# Patient Record
Sex: MALE | Race: WHITE | NOT HISPANIC OR LATINO
[De-identification: names, ages, dates, MRNs, and addresses within clinical notes are randomized per-mention and may not be internally consistent; named-entity substitution may affect disease eponyms.]

---

## 2017-01-12 ENCOUNTER — APPOINTMENT (OUTPATIENT)
Dept: NEPHROLOGY | Facility: CLINIC | Age: 72
End: 2017-01-12

## 2017-01-12 VITALS
WEIGHT: 176.67 LBS | DIASTOLIC BLOOD PRESSURE: 72 MMHG | BODY MASS INDEX: 26.78 KG/M2 | HEIGHT: 68 IN | SYSTOLIC BLOOD PRESSURE: 131 MMHG | OXYGEN SATURATION: 99 % | HEART RATE: 66 BPM

## 2017-01-17 LAB
ALBUMIN SERPL ELPH-MCNC: 4.2 G/DL
ALP BLD-CCNC: 157 U/L
ALT SERPL-CCNC: 10 U/L
ANION GAP SERPL CALC-SCNC: 16 MMOL/L
AST SERPL-CCNC: 10 U/L
BILIRUB SERPL-MCNC: 0.3 MG/DL
BUN SERPL-MCNC: 53 MG/DL
CALCIUM SERPL-MCNC: 9 MG/DL
CHLORIDE SERPL-SCNC: 106 MMOL/L
CO2 SERPL-SCNC: 19 MMOL/L
CREAT SERPL-MCNC: 2.23 MG/DL
GLUCOSE SERPL-MCNC: 127 MG/DL
POTASSIUM SERPL-SCNC: 5.1 MMOL/L
PROT SERPL-MCNC: 7.3 G/DL
PSA FREE FLD-MCNC: 50.2 %
PSA FREE SERPL-MCNC: 1.08 NG/ML
PSA SERPL-MCNC: 2.15 NG/ML
SODIUM SERPL-SCNC: 141 MMOL/L
URATE SERPL-MCNC: 3.5 MG/DL

## 2017-05-02 ENCOUNTER — APPOINTMENT (OUTPATIENT)
Dept: ENDOCRINOLOGY | Facility: CLINIC | Age: 72
End: 2017-05-02

## 2017-05-02 VITALS
DIASTOLIC BLOOD PRESSURE: 78 MMHG | BODY MASS INDEX: 27.13 KG/M2 | HEIGHT: 68 IN | OXYGEN SATURATION: 98 % | HEART RATE: 71 BPM | SYSTOLIC BLOOD PRESSURE: 118 MMHG | WEIGHT: 179 LBS

## 2017-05-02 LAB — GLUCOSE BLDC GLUCOMTR-MCNC: 138

## 2017-05-10 ENCOUNTER — APPOINTMENT (OUTPATIENT)
Dept: NEPHROLOGY | Facility: CLINIC | Age: 72
End: 2017-05-10

## 2017-05-10 VITALS
HEART RATE: 65 BPM | SYSTOLIC BLOOD PRESSURE: 143 MMHG | WEIGHT: 179 LBS | OXYGEN SATURATION: 98 % | BODY MASS INDEX: 27.13 KG/M2 | DIASTOLIC BLOOD PRESSURE: 82 MMHG | HEIGHT: 68 IN

## 2017-05-18 LAB
ALBUMIN SERPL ELPH-MCNC: 4.3 G/DL
ALP BLD-CCNC: 165 U/L
ALT SERPL-CCNC: 10 U/L
ANION GAP SERPL CALC-SCNC: 17 MMOL/L
AST SERPL-CCNC: 13 U/L
BASOPHILS # BLD AUTO: 0.05 K/UL
BASOPHILS NFR BLD AUTO: 0.5 %
BILIRUB SERPL-MCNC: 0.4 MG/DL
BUN SERPL-MCNC: 43 MG/DL
CALCIUM SERPL-MCNC: 9.8 MG/DL
CHLORIDE SERPL-SCNC: 104 MMOL/L
CO2 SERPL-SCNC: 21 MMOL/L
CREAT SERPL-MCNC: 2.15 MG/DL
EOSINOPHIL # BLD AUTO: 0.46 K/UL
EOSINOPHIL NFR BLD AUTO: 4.4 %
GLUCOSE SERPL-MCNC: 73 MG/DL
HCT VFR BLD CALC: 39.1 %
HGB BLD-MCNC: 12.6 G/DL
IMM GRANULOCYTES NFR BLD AUTO: 0.4 %
LYMPHOCYTES # BLD AUTO: 1.39 K/UL
LYMPHOCYTES NFR BLD AUTO: 13.4 %
MAN DIFF?: NORMAL
MCHC RBC-ENTMCNC: 28.4 PG
MCHC RBC-ENTMCNC: 32.2 GM/DL
MCV RBC AUTO: 88.1 FL
MONOCYTES # BLD AUTO: 0.91 K/UL
MONOCYTES NFR BLD AUTO: 8.8 %
NEUTROPHILS # BLD AUTO: 7.49 K/UL
NEUTROPHILS NFR BLD AUTO: 72.5 %
PLATELET # BLD AUTO: 204 K/UL
POTASSIUM SERPL-SCNC: 4.8 MMOL/L
PROT SERPL-MCNC: 7.9 G/DL
RBC # BLD: 4.44 M/UL
RBC # FLD: 14.2 %
SODIUM SERPL-SCNC: 142 MMOL/L
URATE SERPL-MCNC: 3.2 MG/DL
WBC # FLD AUTO: 10.34 K/UL

## 2017-09-05 ENCOUNTER — APPOINTMENT (OUTPATIENT)
Dept: ENDOCRINOLOGY | Facility: CLINIC | Age: 72
End: 2017-09-05
Payer: MEDICARE

## 2017-09-05 VITALS
HEART RATE: 60 BPM | WEIGHT: 175 LBS | SYSTOLIC BLOOD PRESSURE: 130 MMHG | HEIGHT: 68 IN | DIASTOLIC BLOOD PRESSURE: 80 MMHG | OXYGEN SATURATION: 98 % | BODY MASS INDEX: 26.52 KG/M2

## 2017-09-05 PROCEDURE — 99214 OFFICE O/P EST MOD 30 MIN: CPT | Mod: 25

## 2017-09-07 ENCOUNTER — APPOINTMENT (OUTPATIENT)
Dept: NEPHROLOGY | Facility: CLINIC | Age: 72
End: 2017-09-07
Payer: MEDICARE

## 2017-09-07 VITALS
HEIGHT: 68 IN | OXYGEN SATURATION: 99 % | HEART RATE: 66 BPM | BODY MASS INDEX: 26.67 KG/M2 | DIASTOLIC BLOOD PRESSURE: 78 MMHG | SYSTOLIC BLOOD PRESSURE: 122 MMHG | WEIGHT: 176 LBS

## 2017-09-07 PROCEDURE — 99213 OFFICE O/P EST LOW 20 MIN: CPT

## 2018-01-09 ENCOUNTER — APPOINTMENT (OUTPATIENT)
Dept: ENDOCRINOLOGY | Facility: CLINIC | Age: 73
End: 2018-01-09
Payer: MEDICARE

## 2018-01-09 VITALS
DIASTOLIC BLOOD PRESSURE: 58 MMHG | BODY MASS INDEX: 26.83 KG/M2 | WEIGHT: 177 LBS | HEIGHT: 68 IN | HEART RATE: 65 BPM | OXYGEN SATURATION: 98 % | SYSTOLIC BLOOD PRESSURE: 92 MMHG

## 2018-01-09 PROCEDURE — 99214 OFFICE O/P EST MOD 30 MIN: CPT

## 2018-03-01 ENCOUNTER — APPOINTMENT (OUTPATIENT)
Dept: NEPHROLOGY | Facility: CLINIC | Age: 73
End: 2018-03-01
Payer: MEDICARE

## 2018-03-01 VITALS
BODY MASS INDEX: 26.07 KG/M2 | DIASTOLIC BLOOD PRESSURE: 63 MMHG | HEIGHT: 68 IN | SYSTOLIC BLOOD PRESSURE: 109 MMHG | WEIGHT: 172 LBS | HEART RATE: 66 BPM | OXYGEN SATURATION: 97 %

## 2018-03-01 PROCEDURE — 99213 OFFICE O/P EST LOW 20 MIN: CPT

## 2018-03-02 LAB
ALBUMIN SERPL ELPH-MCNC: 3.9 G/DL
ALP BLD-CCNC: 147 U/L
ALT SERPL-CCNC: <4 U/L
ANION GAP SERPL CALC-SCNC: 14 MMOL/L
AST SERPL-CCNC: 9 U/L
BILIRUB SERPL-MCNC: 0.5 MG/DL
BUN SERPL-MCNC: 40 MG/DL
CALCIUM SERPL-MCNC: 9.2 MG/DL
CHLORIDE SERPL-SCNC: 106 MMOL/L
CO2 SERPL-SCNC: 22 MMOL/L
CREAT SERPL-MCNC: 2.03 MG/DL
GLUCOSE SERPL-MCNC: 74 MG/DL
POTASSIUM SERPL-SCNC: 4.5 MMOL/L
PROT SERPL-MCNC: 6.8 G/DL
SODIUM SERPL-SCNC: 142 MMOL/L

## 2018-05-16 ENCOUNTER — APPOINTMENT (OUTPATIENT)
Dept: ENDOCRINOLOGY | Facility: CLINIC | Age: 73
End: 2018-05-16
Payer: MEDICARE

## 2018-05-16 VITALS
WEIGHT: 166 LBS | DIASTOLIC BLOOD PRESSURE: 76 MMHG | HEART RATE: 33 BPM | HEIGHT: 69 IN | OXYGEN SATURATION: 92 % | SYSTOLIC BLOOD PRESSURE: 118 MMHG | BODY MASS INDEX: 24.59 KG/M2

## 2018-05-16 PROCEDURE — 99214 OFFICE O/P EST MOD 30 MIN: CPT | Mod: 25

## 2018-05-16 PROCEDURE — 83036 HEMOGLOBIN GLYCOSYLATED A1C: CPT | Mod: QW

## 2018-05-16 RX ORDER — OSELTAMIVIR PHOSPHATE 75 MG/1
75 CAPSULE ORAL
Qty: 10 | Refills: 0 | Status: COMPLETED | COMMUNITY
Start: 2018-01-16

## 2018-06-12 ENCOUNTER — EMERGENCY (EMERGENCY)
Facility: HOSPITAL | Age: 73
LOS: 1 days | Discharge: HOME | End: 2018-06-12
Attending: EMERGENCY MEDICINE | Admitting: EMERGENCY MEDICINE
Payer: MEDICARE

## 2018-06-12 VITALS
DIASTOLIC BLOOD PRESSURE: 83 MMHG | RESPIRATION RATE: 16 BRPM | WEIGHT: 175.93 LBS | TEMPERATURE: 98 F | HEIGHT: 69 IN | OXYGEN SATURATION: 97 % | HEART RATE: 80 BPM | SYSTOLIC BLOOD PRESSURE: 171 MMHG

## 2018-06-12 VITALS
DIASTOLIC BLOOD PRESSURE: 67 MMHG | SYSTOLIC BLOOD PRESSURE: 120 MMHG | RESPIRATION RATE: 16 BRPM | HEART RATE: 73 BPM | OXYGEN SATURATION: 99 % | TEMPERATURE: 98 F

## 2018-06-12 DIAGNOSIS — Z98.49 CATARACT EXTRACTION STATUS, UNSPECIFIED EYE: Chronic | ICD-10-CM

## 2018-06-12 DIAGNOSIS — N20.0 CALCULUS OF KIDNEY: Chronic | ICD-10-CM

## 2018-06-12 DIAGNOSIS — Z95.5 PRESENCE OF CORONARY ANGIOPLASTY IMPLANT AND GRAFT: Chronic | ICD-10-CM

## 2018-06-12 DIAGNOSIS — Z95.818 PRESENCE OF OTHER CARDIAC IMPLANTS AND GRAFTS: Chronic | ICD-10-CM

## 2018-06-12 DIAGNOSIS — Z98.89 OTHER SPECIFIED POSTPROCEDURAL STATES: Chronic | ICD-10-CM

## 2018-06-12 PROCEDURE — 14040 TIS TRNFR F/C/C/M/N/A/G/H/F: CPT

## 2018-06-12 PROCEDURE — 70450 CT HEAD/BRAIN W/O DYE: CPT

## 2018-06-12 PROCEDURE — 99284 EMERGENCY DEPT VISIT MOD MDM: CPT

## 2018-06-12 PROCEDURE — 90715 TDAP VACCINE 7 YRS/> IM: CPT

## 2018-06-12 PROCEDURE — 70450 CT HEAD/BRAIN W/O DYE: CPT | Mod: 26

## 2018-06-12 PROCEDURE — 99285 EMERGENCY DEPT VISIT HI MDM: CPT | Mod: 25

## 2018-06-12 PROCEDURE — 90471 IMMUNIZATION ADMIN: CPT

## 2018-06-12 RX ORDER — TETANUS TOXOID, REDUCED DIPHTHERIA TOXOID AND ACELLULAR PERTUSSIS VACCINE, ADSORBED 5; 2.5; 8; 8; 2.5 [IU]/.5ML; [IU]/.5ML; UG/.5ML; UG/.5ML; UG/.5ML
0.5 SUSPENSION INTRAMUSCULAR ONCE
Qty: 0 | Refills: 0 | Status: COMPLETED | OUTPATIENT
Start: 2018-06-12 | End: 2018-06-12

## 2018-06-12 RX ORDER — CEPHALEXIN 500 MG
500 CAPSULE ORAL ONCE
Qty: 0 | Refills: 0 | Status: COMPLETED | OUTPATIENT
Start: 2018-06-12 | End: 2018-06-12

## 2018-06-12 RX ORDER — CEPHALEXIN 500 MG
1 CAPSULE ORAL
Qty: 14 | Refills: 0 | OUTPATIENT
Start: 2018-06-12 | End: 2018-06-18

## 2018-06-12 RX ORDER — CEPHALEXIN 500 MG
1 CAPSULE ORAL
Qty: 28 | Refills: 0
Start: 2018-06-12 | End: 2018-06-18

## 2018-06-12 RX ADMIN — TETANUS TOXOID, REDUCED DIPHTHERIA TOXOID AND ACELLULAR PERTUSSIS VACCINE, ADSORBED 0.5 MILLILITER(S): 5; 2.5; 8; 8; 2.5 SUSPENSION INTRAMUSCULAR at 03:25

## 2018-06-12 RX ADMIN — Medication 500 MILLIGRAM(S): at 03:22

## 2018-06-12 NOTE — ED PROVIDER NOTE - PMH
CAD (coronary artery disease)    CRI (chronic renal insufficiency)    CVA (cerebral infarction)    Diabetes    Hyperlipidemia    Hypertension    Kidney stone    Merkel cell carcinoma of face    SBO (small bowel obstruction)  sept 2015

## 2018-06-12 NOTE — PROCEDURE NOTE - NSLAC1DETAILSPROC_SKIN_A_CORE
wound explored to base in bloodless field/stent applied/non-extensive debridement/multiple flaps aligned

## 2018-06-12 NOTE — ED PROVIDER NOTE - PSH
Kidney stones  9/16/19  S/P appendectomy  age 16  S/P cataract surgery    S/P coronary artery stent placement  12/21/10  S/P tonsillectomy and adenoidectomy  at age 31  Status post placement of implantable loop recorder  May 2015

## 2018-06-12 NOTE — ED PROVIDER NOTE - MEDICAL DECISION MAKING DETAILS
73 y.o. M fell out of bed, multiple lacerations to left ear - ct head negative - plastics for repair - abx, outpt f/u for wound check

## 2018-06-12 NOTE — ED PROVIDER NOTE - SKIN, MLM
left ear - complex laceration of tragus 3cm, 3 lacerations of helix to lobule 0.5-1cm in length, all actively bleeding; no other wounds noted

## 2018-06-12 NOTE — ED PROVIDER NOTE - OBJECTIVE STATEMENT
73 y.o. M fell out of bed, was asleep, bleeding from left side of head 73 y.o. M fell out of bed, was asleep, bleeding from left side of head, says has fallen out of bed before, came to hospital b/c bleeding from left ear, is on plavix and asa for CAD, denies any pain, no HA, no neck/back pain, able to move extremities ok, no paresthesias, only here b/c of ear bleeding - considering if wants plastic surgeon

## 2018-08-08 ENCOUNTER — APPOINTMENT (OUTPATIENT)
Dept: ENDOCRINOLOGY | Facility: CLINIC | Age: 73
End: 2018-08-08
Payer: MEDICARE

## 2018-08-08 VITALS
WEIGHT: 170 LBS | DIASTOLIC BLOOD PRESSURE: 64 MMHG | BODY MASS INDEX: 25.18 KG/M2 | OXYGEN SATURATION: 98 % | HEIGHT: 69 IN | HEART RATE: 62 BPM | SYSTOLIC BLOOD PRESSURE: 132 MMHG

## 2018-08-08 PROCEDURE — 83036 HEMOGLOBIN GLYCOSYLATED A1C: CPT | Mod: QW

## 2018-08-08 PROCEDURE — 82962 GLUCOSE BLOOD TEST: CPT

## 2018-08-08 PROCEDURE — 99215 OFFICE O/P EST HI 40 MIN: CPT | Mod: 25

## 2018-08-08 RX ORDER — CEFUROXIME AXETIL 500 MG/1
500 TABLET ORAL
Qty: 10 | Refills: 0 | Status: COMPLETED | COMMUNITY
Start: 2018-07-26

## 2018-08-08 RX ORDER — CEPHALEXIN 500 MG/1
500 CAPSULE ORAL
Qty: 28 | Refills: 0 | Status: COMPLETED | COMMUNITY
Start: 2018-06-12

## 2018-08-08 RX ORDER — SULFAMETHOXAZOLE AND TRIMETHOPRIM 800; 160 MG/1; MG/1
800-160 TABLET ORAL
Qty: 10 | Refills: 0 | Status: COMPLETED | COMMUNITY
Start: 2018-07-26

## 2018-08-09 LAB
GLUCOSE BLDC GLUCOMTR-MCNC: 199
HBA1C MFR BLD HPLC: 6.5

## 2018-10-10 ENCOUNTER — APPOINTMENT (OUTPATIENT)
Dept: ENDOCRINOLOGY | Facility: CLINIC | Age: 73
End: 2018-10-10
Payer: MEDICARE

## 2018-10-10 PROCEDURE — 95250 CONT GLUC MNTR PHYS/QHP EQP: CPT

## 2018-10-10 PROCEDURE — 95251 CONT GLUC MNTR ANALYSIS I&R: CPT

## 2018-10-12 ENCOUNTER — CLINICAL ADVICE (OUTPATIENT)
Age: 73
End: 2018-10-12

## 2018-10-17 ENCOUNTER — CLINICAL ADVICE (OUTPATIENT)
Age: 73
End: 2018-10-17

## 2018-10-31 ENCOUNTER — APPOINTMENT (OUTPATIENT)
Dept: ENDOCRINOLOGY | Facility: CLINIC | Age: 73
End: 2018-10-31
Payer: MEDICARE

## 2018-10-31 VITALS — BODY MASS INDEX: 24.37 KG/M2 | WEIGHT: 165 LBS

## 2018-10-31 PROCEDURE — 83036 HEMOGLOBIN GLYCOSYLATED A1C: CPT | Mod: QW

## 2018-10-31 PROCEDURE — G0108 DIAB MANAGE TRN  PER INDIV: CPT

## 2018-10-31 PROCEDURE — 82962 GLUCOSE BLOOD TEST: CPT

## 2018-11-01 LAB
GLUCOSE BLDC GLUCOMTR-MCNC: 130
HBA1C MFR BLD HPLC: 6.5

## 2018-11-13 ENCOUNTER — INBOUND DOCUMENT (OUTPATIENT)
Age: 73
End: 2018-11-13

## 2018-12-04 ENCOUNTER — LABORATORY RESULT (OUTPATIENT)
Age: 73
End: 2018-12-04

## 2018-12-04 ENCOUNTER — APPOINTMENT (OUTPATIENT)
Dept: ENDOCRINOLOGY | Facility: CLINIC | Age: 73
End: 2018-12-04
Payer: MEDICARE

## 2018-12-04 VITALS
WEIGHT: 167 LBS | BODY MASS INDEX: 24.73 KG/M2 | HEIGHT: 69 IN | SYSTOLIC BLOOD PRESSURE: 130 MMHG | HEART RATE: 65 BPM | DIASTOLIC BLOOD PRESSURE: 60 MMHG | OXYGEN SATURATION: 98 %

## 2018-12-04 LAB — GLUCOSE BLDC GLUCOMTR-MCNC: 140

## 2018-12-04 PROCEDURE — 82962 GLUCOSE BLOOD TEST: CPT

## 2018-12-04 PROCEDURE — 99214 OFFICE O/P EST MOD 30 MIN: CPT | Mod: 25

## 2018-12-05 LAB
ALBUMIN SERPL ELPH-MCNC: 4.2 G/DL
ALP BLD-CCNC: 139 U/L
ALT SERPL-CCNC: <5 U/L
ANION GAP SERPL CALC-SCNC: 11 MMOL/L
AST SERPL-CCNC: 10 U/L
BILIRUB SERPL-MCNC: 0.3 MG/DL
BUN SERPL-MCNC: 48 MG/DL
CALCIUM SERPL-MCNC: 9.2 MG/DL
CHLORIDE SERPL-SCNC: 103 MMOL/L
CO2 SERPL-SCNC: 24 MMOL/L
CREAT SERPL-MCNC: 1.86 MG/DL
GLUCOSE SERPL-MCNC: 133 MG/DL
POTASSIUM SERPL-SCNC: 4.4 MMOL/L
PROT SERPL-MCNC: 6.9 G/DL
SODIUM SERPL-SCNC: 138 MMOL/L
T3RU NFR SERPL: 0.94 INDEX
T4 SERPL-MCNC: 6.7 UG/DL
TSH SERPL-ACNC: 2.3 UIU/ML

## 2019-03-19 ENCOUNTER — APPOINTMENT (OUTPATIENT)
Dept: ENDOCRINOLOGY | Facility: CLINIC | Age: 74
End: 2019-03-19
Payer: MEDICARE

## 2019-03-19 VITALS — WEIGHT: 164 LBS | BODY MASS INDEX: 24.22 KG/M2

## 2019-03-19 PROCEDURE — 82962 GLUCOSE BLOOD TEST: CPT

## 2019-03-19 PROCEDURE — 83036 HEMOGLOBIN GLYCOSYLATED A1C: CPT | Mod: QW

## 2019-03-19 PROCEDURE — G0108 DIAB MANAGE TRN  PER INDIV: CPT

## 2019-03-21 LAB
GLUCOSE BLDC GLUCOMTR-MCNC: 229
HBA1C MFR BLD HPLC: 6.9

## 2019-06-11 ENCOUNTER — LABORATORY RESULT (OUTPATIENT)
Age: 74
End: 2019-06-11

## 2019-06-11 ENCOUNTER — APPOINTMENT (OUTPATIENT)
Dept: ENDOCRINOLOGY | Facility: CLINIC | Age: 74
End: 2019-06-11
Payer: MEDICARE

## 2019-06-11 VITALS
BODY MASS INDEX: 24.29 KG/M2 | HEIGHT: 69 IN | WEIGHT: 164 LBS | DIASTOLIC BLOOD PRESSURE: 60 MMHG | SYSTOLIC BLOOD PRESSURE: 120 MMHG | HEART RATE: 59 BPM | OXYGEN SATURATION: 97 %

## 2019-06-11 PROCEDURE — 82962 GLUCOSE BLOOD TEST: CPT

## 2019-06-11 PROCEDURE — 83036 HEMOGLOBIN GLYCOSYLATED A1C: CPT | Mod: QW

## 2019-06-11 PROCEDURE — 95251 CONT GLUC MNTR ANALYSIS I&R: CPT

## 2019-06-11 PROCEDURE — 99214 OFFICE O/P EST MOD 30 MIN: CPT | Mod: 25

## 2019-06-11 NOTE — REVIEW OF SYSTEMS
[Myalgia] : myalgia  [Cold Intolerance] : cold intolerant [Back Pain] : back pain [Negative] : Psychiatric [All other systems negative] : All other systems negative [Chest Pain] : no chest pain [Vomiting] : no vomiting was observed [Shortness Of Breath] : no shortness of breath [Nausea] : no nausea [de-identified] : Parkinsons

## 2019-06-11 NOTE — ASSESSMENT
[Carbohydrate Consistent Diet] : carbohydrate consistent diet [Importance of Diet and Exercise] : importance of diet and exercise to improve glycemic control, achieve weight loss and improve cardiovascular health [Hypoglycemia Management] : hypoglycemia management [FreeTextEntry1] : 73 y/o male with \par \par 1. T2DM: Current A1C 7.5%  which appears higher than estimated from avg  by Alondra. DM complicated by nephropathy. Pt met with CDE previously . Decreased Lantus to 28 and NovoLog 5-3-5. \par Minimal hypos on this regimen. I would not try to increase insulin due to risk of Hypos. current Endo Soc guidleines for DM care in elderly reviewed. \par  \par 2. HLD: LDL at goal on statin. Cardiology lowered statin for LDL: 31. \par \par 3. HTN: /60 on decreased amlodipine \par \par 4. Subclinical hypothyroidism: on LT4 76. No sx of hyper or hypothyroidism.\par \par Complicated by prior CVA and Parkinsons. Pt had a prostate biopsy and dx with prostate CA in 2018. No treatment yet. Pt is seeing OT. \par \par I request labs sent out today. f/u in 3 mons.
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
## 2019-06-11 NOTE — PHYSICAL EXAM
[Alert] : alert [No Acute Distress] : no acute distress [Well Nourished] : well nourished [No Proptosis] : no proptosis [Normal Sclera/Conjunctiva] : normal sclera/conjunctiva [Well Developed] : well developed [Normal Oropharynx] : the oropharynx was normal [Thyroid Not Enlarged] : the thyroid was not enlarged [No Respiratory Distress] : no respiratory distress [No Thyroid Nodules] : there were no palpable thyroid nodules [No Accessory Muscle Use] : no accessory muscle use [Clear to Auscultation] : lungs were clear to auscultation bilaterally [Normal S1, S2] : normal S1 and S2 [Normal Rate] : heart rate was normal  [Not Tender] : non-tender [Normal Bowel Sounds] : normal bowel sounds [Regular Rhythm] : with a regular rhythm [Soft] : abdomen soft [Not Distended] : not distended [No Stigmata of Cushings Syndrome] : no stigmata of cushings syndrome [Normal Gait] : normal gait [No Rash] : no rash [2+] : 2+ in the dorsalis pedis [Normal Reflexes] : deep tendon reflexes were 2+ and symmetric [No Tremors] : no tremors [Oriented x3] : oriented to person, place, and time [de-identified] : central obesity

## 2019-06-12 LAB
ALBUMIN SERPL ELPH-MCNC: 4.5 G/DL
ALP BLD-CCNC: 118 U/L
ALT SERPL-CCNC: <5 U/L
ANION GAP SERPL CALC-SCNC: 14 MMOL/L
AST SERPL-CCNC: 11 U/L
BASOPHILS # BLD AUTO: 0.04 K/UL
BASOPHILS NFR BLD AUTO: 0.5 %
BILIRUB SERPL-MCNC: 0.4 MG/DL
BUN SERPL-MCNC: 55 MG/DL
CALCIUM SERPL-MCNC: 10.1 MG/DL
CHLORIDE SERPL-SCNC: 107 MMOL/L
CO2 SERPL-SCNC: 21 MMOL/L
CREAT SERPL-MCNC: 2.14 MG/DL
CREAT SPEC-SCNC: 81 MG/DL
EOSINOPHIL # BLD AUTO: 0.5 K/UL
EOSINOPHIL NFR BLD AUTO: 5.9 %
GLUCOSE BLDC GLUCOMTR-MCNC: 105
GLUCOSE SERPL-MCNC: 112 MG/DL
HBA1C MFR BLD HPLC: 7.5
HCT VFR BLD CALC: 35.3 %
HGB BLD-MCNC: 11.2 G/DL
IMM GRANULOCYTES NFR BLD AUTO: 0.5 %
LYMPHOCYTES # BLD AUTO: 1.15 K/UL
LYMPHOCYTES NFR BLD AUTO: 13.6 %
MAN DIFF?: NORMAL
MCHC RBC-ENTMCNC: 29.5 PG
MCHC RBC-ENTMCNC: 31.7 GM/DL
MCV RBC AUTO: 92.9 FL
MICROALBUMIN 24H UR DL<=1MG/L-MCNC: 23.3 MG/DL
MICROALBUMIN/CREAT 24H UR-RTO: 286 MG/G
MONOCYTES # BLD AUTO: 0.69 K/UL
MONOCYTES NFR BLD AUTO: 8.1 %
NEUTROPHILS # BLD AUTO: 6.06 K/UL
NEUTROPHILS NFR BLD AUTO: 71.4 %
PLATELET # BLD AUTO: 161 K/UL
POTASSIUM SERPL-SCNC: 5.6 MMOL/L
PROT SERPL-MCNC: 7.1 G/DL
RBC # BLD: 3.8 M/UL
RBC # FLD: 14.6 %
SODIUM SERPL-SCNC: 142 MMOL/L
T3RU NFR SERPL: 1 TBI
T4 SERPL-MCNC: 6.6 UG/DL
TSH SERPL-ACNC: 2.17 UIU/ML
WBC # FLD AUTO: 8.48 K/UL

## 2019-07-04 ENCOUNTER — EMERGENCY (EMERGENCY)
Facility: HOSPITAL | Age: 74
LOS: 1 days | Discharge: HOME | End: 2019-07-04
Attending: EMERGENCY MEDICINE
Payer: MEDICARE

## 2019-07-04 VITALS
DIASTOLIC BLOOD PRESSURE: 74 MMHG | TEMPERATURE: 98 F | RESPIRATION RATE: 18 BRPM | WEIGHT: 164.02 LBS | OXYGEN SATURATION: 100 % | HEIGHT: 68 IN | SYSTOLIC BLOOD PRESSURE: 118 MMHG | HEART RATE: 60 BPM

## 2019-07-04 VITALS
SYSTOLIC BLOOD PRESSURE: 120 MMHG | HEART RATE: 72 BPM | TEMPERATURE: 98 F | RESPIRATION RATE: 18 BRPM | OXYGEN SATURATION: 100 % | DIASTOLIC BLOOD PRESSURE: 73 MMHG

## 2019-07-04 DIAGNOSIS — N20.0 CALCULUS OF KIDNEY: Chronic | ICD-10-CM

## 2019-07-04 DIAGNOSIS — Z95.5 PRESENCE OF CORONARY ANGIOPLASTY IMPLANT AND GRAFT: Chronic | ICD-10-CM

## 2019-07-04 DIAGNOSIS — Z95.818 PRESENCE OF OTHER CARDIAC IMPLANTS AND GRAFTS: Chronic | ICD-10-CM

## 2019-07-04 DIAGNOSIS — Z98.89 OTHER SPECIFIED POSTPROCEDURAL STATES: Chronic | ICD-10-CM

## 2019-07-04 DIAGNOSIS — Z98.49 CATARACT EXTRACTION STATUS, UNSPECIFIED EYE: Chronic | ICD-10-CM

## 2019-07-04 LAB
ALBUMIN SERPL ELPH-MCNC: 3.8 G/DL — SIGNIFICANT CHANGE UP (ref 3.3–5)
ALP SERPL-CCNC: 106 U/L — SIGNIFICANT CHANGE UP (ref 40–120)
ALT FLD-CCNC: <5 U/L — LOW (ref 10–45)
ANION GAP SERPL CALC-SCNC: 9 MMOL/L — SIGNIFICANT CHANGE UP (ref 5–17)
APPEARANCE UR: CLEAR — SIGNIFICANT CHANGE UP
APTT BLD: 27.8 SEC — SIGNIFICANT CHANGE UP (ref 27.5–36.3)
AST SERPL-CCNC: 6 U/L — LOW (ref 10–40)
BACTERIA # UR AUTO: 0 — SIGNIFICANT CHANGE UP
BASOPHILS # BLD AUTO: 0 K/UL — SIGNIFICANT CHANGE UP (ref 0–0.2)
BASOPHILS NFR BLD AUTO: 0.2 % — SIGNIFICANT CHANGE UP (ref 0–2)
BILIRUB SERPL-MCNC: 0.5 MG/DL — SIGNIFICANT CHANGE UP (ref 0.2–1.2)
BILIRUB UR-MCNC: NEGATIVE — SIGNIFICANT CHANGE UP
BUN SERPL-MCNC: 46 MG/DL — HIGH (ref 7–23)
CALCIUM SERPL-MCNC: 9.3 MG/DL — SIGNIFICANT CHANGE UP (ref 8.4–10.5)
CHLORIDE SERPL-SCNC: 106 MMOL/L — SIGNIFICANT CHANGE UP (ref 96–108)
CO2 SERPL-SCNC: 23 MMOL/L — SIGNIFICANT CHANGE UP (ref 22–31)
COLOR SPEC: COLORLESS — SIGNIFICANT CHANGE UP
CREAT SERPL-MCNC: 1.88 MG/DL — HIGH (ref 0.5–1.3)
DIFF PNL FLD: NEGATIVE — SIGNIFICANT CHANGE UP
EOSINOPHIL # BLD AUTO: 0.2 K/UL — SIGNIFICANT CHANGE UP (ref 0–0.5)
EOSINOPHIL NFR BLD AUTO: 1.4 % — SIGNIFICANT CHANGE UP (ref 0–6)
EPI CELLS # UR: 0 /HPF — SIGNIFICANT CHANGE UP
GAS PNL BLDV: SIGNIFICANT CHANGE UP
GLUCOSE SERPL-MCNC: 206 MG/DL — HIGH (ref 70–99)
GLUCOSE UR QL: ABNORMAL
HCT VFR BLD CALC: 32 % — LOW (ref 39–50)
HGB BLD-MCNC: 11 G/DL — LOW (ref 13–17)
HYALINE CASTS # UR AUTO: 0 /LPF — SIGNIFICANT CHANGE UP (ref 0–2)
INR BLD: 0.93 RATIO — SIGNIFICANT CHANGE UP (ref 0.88–1.16)
KETONES UR-MCNC: NEGATIVE — SIGNIFICANT CHANGE UP
LEUKOCYTE ESTERASE UR-ACNC: NEGATIVE — SIGNIFICANT CHANGE UP
LYMPHOCYTES # BLD AUTO: 0.6 K/UL — LOW (ref 1–3.3)
LYMPHOCYTES # BLD AUTO: 5.4 % — LOW (ref 13–44)
MCHC RBC-ENTMCNC: 30.8 PG — SIGNIFICANT CHANGE UP (ref 27–34)
MCHC RBC-ENTMCNC: 34.3 GM/DL — SIGNIFICANT CHANGE UP (ref 32–36)
MCV RBC AUTO: 90.1 FL — SIGNIFICANT CHANGE UP (ref 80–100)
MONOCYTES # BLD AUTO: 0.6 K/UL — SIGNIFICANT CHANGE UP (ref 0–0.9)
MONOCYTES NFR BLD AUTO: 5.8 % — SIGNIFICANT CHANGE UP (ref 2–14)
NEUTROPHILS # BLD AUTO: 9.4 K/UL — HIGH (ref 1.8–7.4)
NEUTROPHILS NFR BLD AUTO: 87.2 % — HIGH (ref 43–77)
NITRITE UR-MCNC: NEGATIVE — SIGNIFICANT CHANGE UP
PH UR: 6 — SIGNIFICANT CHANGE UP (ref 5–8)
PLATELET # BLD AUTO: 151 K/UL — SIGNIFICANT CHANGE UP (ref 150–400)
POTASSIUM SERPL-MCNC: 4.9 MMOL/L — SIGNIFICANT CHANGE UP (ref 3.5–5.3)
POTASSIUM SERPL-SCNC: 4.9 MMOL/L — SIGNIFICANT CHANGE UP (ref 3.5–5.3)
PROT SERPL-MCNC: 6.8 G/DL — SIGNIFICANT CHANGE UP (ref 6–8.3)
PROT UR-MCNC: ABNORMAL
PROTHROM AB SERPL-ACNC: 10.7 SEC — SIGNIFICANT CHANGE UP (ref 10–12.9)
RBC # BLD: 3.55 M/UL — LOW (ref 4.2–5.8)
RBC # FLD: 14 % — SIGNIFICANT CHANGE UP (ref 10.3–14.5)
RBC CASTS # UR COMP ASSIST: 1 /HPF — SIGNIFICANT CHANGE UP (ref 0–4)
SODIUM SERPL-SCNC: 138 MMOL/L — SIGNIFICANT CHANGE UP (ref 135–145)
SP GR SPEC: 1.01 — SIGNIFICANT CHANGE UP (ref 1.01–1.02)
TROPONIN T, HIGH SENSITIVITY RESULT: 29 NG/L — SIGNIFICANT CHANGE UP (ref 0–51)
UROBILINOGEN FLD QL: NEGATIVE — SIGNIFICANT CHANGE UP
WBC # BLD: 10.8 K/UL — HIGH (ref 3.8–10.5)
WBC # FLD AUTO: 10.8 K/UL — HIGH (ref 3.8–10.5)
WBC UR QL: 0 /HPF — SIGNIFICANT CHANGE UP (ref 0–5)

## 2019-07-04 PROCEDURE — 87086 URINE CULTURE/COLONY COUNT: CPT

## 2019-07-04 PROCEDURE — 99284 EMERGENCY DEPT VISIT MOD MDM: CPT | Mod: 25

## 2019-07-04 PROCEDURE — 71045 X-RAY EXAM CHEST 1 VIEW: CPT

## 2019-07-04 PROCEDURE — 82330 ASSAY OF CALCIUM: CPT

## 2019-07-04 PROCEDURE — 81001 URINALYSIS AUTO W/SCOPE: CPT

## 2019-07-04 PROCEDURE — 87150 DNA/RNA AMPLIFIED PROBE: CPT

## 2019-07-04 PROCEDURE — 85027 COMPLETE CBC AUTOMATED: CPT

## 2019-07-04 PROCEDURE — 99284 EMERGENCY DEPT VISIT MOD MDM: CPT | Mod: GC

## 2019-07-04 PROCEDURE — 70450 CT HEAD/BRAIN W/O DYE: CPT | Mod: 26

## 2019-07-04 PROCEDURE — 84295 ASSAY OF SERUM SODIUM: CPT

## 2019-07-04 PROCEDURE — 84132 ASSAY OF SERUM POTASSIUM: CPT

## 2019-07-04 PROCEDURE — 70450 CT HEAD/BRAIN W/O DYE: CPT

## 2019-07-04 PROCEDURE — 97162 PT EVAL MOD COMPLEX 30 MIN: CPT

## 2019-07-04 PROCEDURE — 74018 RADEX ABDOMEN 1 VIEW: CPT

## 2019-07-04 PROCEDURE — 71045 X-RAY EXAM CHEST 1 VIEW: CPT | Mod: 26

## 2019-07-04 PROCEDURE — 85730 THROMBOPLASTIN TIME PARTIAL: CPT

## 2019-07-04 PROCEDURE — 82435 ASSAY OF BLOOD CHLORIDE: CPT

## 2019-07-04 PROCEDURE — 82962 GLUCOSE BLOOD TEST: CPT

## 2019-07-04 PROCEDURE — 82947 ASSAY GLUCOSE BLOOD QUANT: CPT

## 2019-07-04 PROCEDURE — 82550 ASSAY OF CK (CPK): CPT

## 2019-07-04 PROCEDURE — 87040 BLOOD CULTURE FOR BACTERIA: CPT

## 2019-07-04 PROCEDURE — 82803 BLOOD GASES ANY COMBINATION: CPT

## 2019-07-04 PROCEDURE — 84484 ASSAY OF TROPONIN QUANT: CPT

## 2019-07-04 PROCEDURE — 85610 PROTHROMBIN TIME: CPT

## 2019-07-04 PROCEDURE — 93005 ELECTROCARDIOGRAM TRACING: CPT

## 2019-07-04 PROCEDURE — 85014 HEMATOCRIT: CPT

## 2019-07-04 PROCEDURE — 80053 COMPREHEN METABOLIC PANEL: CPT

## 2019-07-04 PROCEDURE — 74018 RADEX ABDOMEN 1 VIEW: CPT | Mod: 26

## 2019-07-04 PROCEDURE — 83605 ASSAY OF LACTIC ACID: CPT

## 2019-07-04 RX ORDER — SODIUM CHLORIDE 9 MG/ML
1000 INJECTION INTRAMUSCULAR; INTRAVENOUS; SUBCUTANEOUS ONCE
Refills: 0 | Status: COMPLETED | OUTPATIENT
Start: 2019-07-04 | End: 2019-07-04

## 2019-07-04 RX ADMIN — SODIUM CHLORIDE 1000 MILLILITER(S): 9 INJECTION INTRAMUSCULAR; INTRAVENOUS; SUBCUTANEOUS at 08:43

## 2019-07-04 NOTE — CHART NOTE - NSCHARTNOTEFT_GEN_A_CORE
Emergency Room : LMSW received a referral from the medical team for discharge planning resources. Medical chart was reviewed.  Patient is a 74 year old   male presented to the ED for altered mental status.  Per chart patient's medical history includes: CAD (last stent 08/2015), HTN, HLD, DM2, hypothyroid, CKD, CVA (2014, lost partial vision of R side),  and SBO (09/2015).  LMSW introduced herself to the patient and he verbalized understanding the role of the . Demographic information was reviewed and confirmed.  Patient agreed to meet with  with his wife, Amira Gagnon and son present.  Per wife the patient had difficulty ambulating yesterday and periods of confusion.  Patient is currently undergoing radiation therapy for prostate cancer. Patient noted  he was independent with ADLS but now requires assistance. Patient was evaluated by PT and recommended to outpatient PT/ OT.  Rolling walker was also recommended; walker provided at bedside.  Dr. José provided a script for outpatient PT/OT. No barriers identified for discharge. Patient has identified his wife as his primary caregiver.  LMSW will continue to follow up as needed.

## 2019-07-04 NOTE — ED PROVIDER NOTE - NS ED ROS FT
Constitutional: no fevers, no chills. +AMS +weakness  Eyes: no visual changes.  Ears: no ear drainage, no ear pain.  Nose: no nasal congestion.  Mouth/Throat: no sore throat.  Cardiovascular: no chest pain.  Respiratory: no shortness of breath, no wheezing, no cough  Gastrointestinal: no nausea, no vomiting, no diarrhea, no abdominal pain.  MSK: no flank pain, no back pain.  Genitourinary: no dysuria, no hematuria.  Skin: no rashes.  Neuro: no headache,   Psychiatric: no known mental health issues.

## 2019-07-04 NOTE — ED PROVIDER NOTE - CLINICAL SUMMARY MEDICAL DECISION MAKING FREE TEXT BOX
CTH, Sepsis-IV heplock and flush as per protocol, Cardiac monitor, Pulse Oximetry, Labs, VBG, BCx, UA, UCx, IVF, antibiotics, analgesia as needed, antipyretics as needed, Reassess and consider repeat labs as needed for lactate trending. Acute confusion and weakness in elderly male with cardiac + stroke hx and on radiation - CTH to r/o bleed, work up Sepsis-IV heplock and flush as per protocol, Cardiac monitor, Pulse Oximetry, Labs, VBG, BCx, UA, UCx, IVF, antibiotics, analgesia as needed, antipyretics as needed, Reassess and consider repeat labs as needed for lactate trending. ekg and trop to r/o acs. If workup negative, Will likely have outpt PCP workup with worsening dementia or medication adjustment. Acute confusion and weakness in elderly male with cardiac + stroke hx and on radiation - CTH to r/o bleed, work up Sepsis-IV heplock and flush as per protocol, Cardiac monitor, Pulse Oximetry, Labs, VBG, BCx, UA, UCx, IVF, antibiotics, analgesia as needed, antipyretics as needed, Reassess and consider repeat labs as needed for lactate trending. ekg and trop to r/o acs. If workup negative, Will likely have outpt PCP workup with worsening dementia or medication adjustment.    GUY José MD: 74M hx CAD (last stent 08/2015), HTN, HLD, DM2, hypothyroid, CKD, CVA (2014, lost partial vision of R side), SBO (09/2015), nephrolithiasis, prostate cancer s/p radiation (6 weeks) presents with confusion and weakness x couple weeks. Yesterday legs buckled under him, but did not fall or hit head. AAOx1 yesterday with memory worsening according to family. Family also notes some baseline dementia over the years. No BM in 6 days. Patient denies chest pain, SOB, f/c, cough, abd pain, N/V/D/C, HA, dizziness, urinary symptoms, extremity pain or swelling or other complaints. Acute confusion and weakness in elderly male with cardiac + stroke hx and on radiation - CTH to r/o bleed, work up Sepsis-IV heplock and flush as per protocol, Cardiac monitor, Pulse Oximetry, Labs, VBG, BCx, UA, UCx, IVF, antibiotics, analgesia as needed, antipyretics as needed, Reassess and consider repeat labs as needed for lactate trending. ekg and trop to r/o acs. If workup negative, Will likely have outpt PCP workup with worsening dementia or medication adjustment.    GUY José MD: Pt is a 75 y/o male with PMH CAD (last stent 08/2015), HTN, HLD, DM2, hypothyroid, CKD, CVA (2014, lost partial vision of R side), SBO (09/2015), nephrolithiasis, prostate cancer s/p radiation (6 weeks) is BIB wife and son for intermittent confusion and generalized weakness x couple weeks. Yesterday legs his buckled under him, but did not fall or hit head, was caught by wife. Pt AAOx1 yesterday, however, A+Ox3 today, per son (also a ). Family also notes some baseline dementia over the years. No BM in 6 days, however, has had chronic issues with constipation. Patient denies chest pain, SOB, f/c, cough, abd pain, N/V/D/C, HA, dizziness, urinary symptoms, extremity pain or swelling or other complaints. Exam: A & O x 3, NAD, HEENT WNL and no facial asymmetry; lungs CTAB, cor +S1/S2, RRR, no murmurs; abdomen soft NTND; extremities with no edema; skin with no rashes, neuro exam non focal with no motor or sensory deficits. Family would like pt to stay home if possible so that he can obtain his CA treatments, however, would also like pt to obtain PT. DDx: deconditioning, infectious process, metabolic process, ICH, dementia, other. Plan: basic labs, u/a, ucx, CTH, socialwork consult, further d/w family regarding admission for rehab vs placement

## 2019-07-04 NOTE — ED ADULT NURSE NOTE - OBJECTIVE STATEMENT
74 y.o. Male presents to the ED accompanied by wife and son for confusion, weakness for the past 3 weeks. Hx prostate CA - on radiation for the past 6 weeks, hypothyroidism, HTN, HLD, DM2 - on insulin (has glucose reading on L upper arm), CKD, CVA (2014, lost partial vision on R side), Parkinson's. Family reports pt's legs buckled yesterday - wife caught pt. Denies falling, hitting head. Family states pt was A&Ox1 last night. A&Ox3 currently. Neuro intact. Pt is in no current distress. Comfort and safety provided. Will continue to monitor. Dr. Terry at bedside for assessment. 74 y.o. Male presents to the ED accompanied by wife and son for confusion, weakness for the past 3 weeks. Hx prostate CA - on radiation for the past 6 weeks, hypothyroidism, HTN, HLD, DM2 - on insulin (has glucose reading on L upper arm), CKD, CVA (2014, lost partial vision on R side), Parkinson's. Family reports pt's legs buckled yesterday - wife caught pt. Denies falling, hitting head. Family states pt was A&Ox1 last night. A&Ox3 currently. Neuro intact. Denies CP, SOB, N/V/D, urinary complications, fever/chills. Pt is in no current distress. Comfort and safety provided. Will continue to monitor. Dr. Terry at bedside for assessment.

## 2019-07-04 NOTE — PHYSICAL THERAPY INITIAL EVALUATION ADULT - DISCHARGE DISPOSITION, PT EVAL
outpatient PT/outpatient OT recommended as well for fine motor issues with  ADLS per report of family.

## 2019-07-04 NOTE — ED PROVIDER NOTE - NSFOLLOWUPINSTRUCTIONS_ED_ALL_ED_FT
1) You were seen in the ER for confusion. The patient has been informed of all concerning signs and symptoms to return to Emergency Department, the necessity to follow up with PMD/Clinic/follow up provided within 2-3 days was explained, and the patient reports understanding of above with capacity and insight. You can look at the discharge papers for some examples of specific signs and symptoms to look out for.   2) Please follow up with Occupational Therapy for Difficulty with ADLs and Physical Therapy for Ataxia.   3) Please use a WALKER to ambulate.

## 2019-07-04 NOTE — PHYSICAL THERAPY INITIAL EVALUATION ADULT - ADDITIONAL COMMENTS
Pt lives with his wife in a private ranch house with no steps to  Pt did not use a device for amb but per family pt is not walking far and fatigues easily. Of note, pt undergoes daily radiation for prostate cancer and pt is "wiped out" after treatment per wife.

## 2019-07-04 NOTE — ED ADULT NURSE NOTE - NSIMPLEMENTINTERV_GEN_ALL_ED
Implemented All Fall Risk Interventions:  [place] to call system. Call bell, personal items and telephone within reach. Instruct patient to call for assistance. Room bathroom lighting operational. Non-slip footwear when patient is off stretcher. Physically safe environment: no spills, clutter or unnecessary equipment. Stretcher in lowest position, wheels locked, appropriate side rails in place. Provide visual cue, wrist band, yellow gown, etc. Monitor gait and stability. Monitor for mental status changes and reorient to person, place, and time. Review medications for side effects contributing to fall risk. Reinforce activity limits and safety measures with patient and family.

## 2019-07-04 NOTE — ED PROVIDER NOTE - PHYSICAL EXAMINATION
GEN: Well appearing, well nourished, in no apparent distress.  HEAD: NCAT  HEENT: PERRL, EOMI, no nystagmus, no facial droop, Airway patent, uvula midline, MMM, neck supple, no LAD, no JVD, no raccoon sign, no godfrey sign   LUNG: CTAB, no adventitious sounds, no retractions, no nasal flaring  CV: RRR, no murmurs,   Abd: soft, NTND, no rebound or guarding, BS+ in all quadrants, no CVAT  MSK: WWP, Pulses 2+ in extremities, No edema, no visible deformities, strength 5/5 in all extremities, no midspine TTP  Neuro:  CN II-XII in tact. AAOx3, Ambulatory with stable gait. sensations in tact in all extremities, neg romberg, neg pronator drift, neg finger to nose,   Skin: Warm and dry, no evidence of rash  Psych: normal mood and affect

## 2019-07-04 NOTE — ED PROVIDER NOTE - OBJECTIVE STATEMENT
74M hx CAD (last stent 08/2015), HTN, HLD, DM2, hypothyroid, CKD, CVA (2014, lost partial vision of R side), SBO (09/2015), nephrolithiasis, prostate cancer s/p radiation (6 weeks) presents with confusion and weakness x couple weeks. Yesterday legs buckled under him, AAOx1 yesterday with memory worsening. Family notes some baseline dementia. No BM in 6 days. Patient denies chest pain, SOB, f/c, cough, abd pain, N/V/D/C, HA, dizziness, urinary symptoms, extremity pain or swelling or other complaints. 74M hx CAD (last stent 08/2015), HTN, HLD, DM2, hypothyroid, CKD, CVA (2014, lost partial vision of R side), SBO (09/2015), nephrolithiasis, prostate cancer s/p radiation (6 weeks) presents with confusion and weakness x couple weeks. Yesterday legs buckled under him, but did not fall or hit head. AAOx1 yesterday with memory worsening according to family. Family also notes some baseline dementia over the years. No BM in 6 days. Patient denies chest pain, SOB, f/c, cough, abd pain, N/V/D/C, HA, dizziness, urinary symptoms, extremity pain or swelling or other complaints.

## 2019-07-05 ENCOUNTER — EMERGENCY (EMERGENCY)
Facility: HOSPITAL | Age: 74
LOS: 1 days | Discharge: HOME | End: 2019-07-05
Attending: EMERGENCY MEDICINE | Admitting: EMERGENCY MEDICINE
Payer: MEDICARE

## 2019-07-05 VITALS
SYSTOLIC BLOOD PRESSURE: 115 MMHG | HEIGHT: 69 IN | WEIGHT: 162.92 LBS | TEMPERATURE: 98 F | HEART RATE: 68 BPM | OXYGEN SATURATION: 100 % | RESPIRATION RATE: 16 BRPM | DIASTOLIC BLOOD PRESSURE: 55 MMHG

## 2019-07-05 DIAGNOSIS — Z95.5 PRESENCE OF CORONARY ANGIOPLASTY IMPLANT AND GRAFT: Chronic | ICD-10-CM

## 2019-07-05 DIAGNOSIS — Z98.89 OTHER SPECIFIED POSTPROCEDURAL STATES: Chronic | ICD-10-CM

## 2019-07-05 DIAGNOSIS — Z98.49 CATARACT EXTRACTION STATUS, UNSPECIFIED EYE: Chronic | ICD-10-CM

## 2019-07-05 DIAGNOSIS — Z95.818 PRESENCE OF OTHER CARDIAC IMPLANTS AND GRAFTS: Chronic | ICD-10-CM

## 2019-07-05 DIAGNOSIS — N20.0 CALCULUS OF KIDNEY: Chronic | ICD-10-CM

## 2019-07-05 LAB
-  COAGULASE NEGATIVE STAPHYLOCOCCUS: SIGNIFICANT CHANGE UP
ALBUMIN SERPL ELPH-MCNC: 3.4 G/DL — SIGNIFICANT CHANGE UP (ref 3.3–5)
ALP SERPL-CCNC: 132 U/L — HIGH (ref 30–120)
ALT FLD-CCNC: <10 U/L DA — LOW (ref 10–60)
ANION GAP SERPL CALC-SCNC: 10 MMOL/L — SIGNIFICANT CHANGE UP (ref 5–17)
APTT BLD: 21.9 SEC — LOW (ref 28.5–37)
AST SERPL-CCNC: 9 U/L — LOW (ref 10–40)
BASOPHILS # BLD AUTO: 0.02 K/UL — SIGNIFICANT CHANGE UP (ref 0–0.2)
BASOPHILS NFR BLD AUTO: 0.2 % — SIGNIFICANT CHANGE UP (ref 0–2)
BILIRUB SERPL-MCNC: 0.4 MG/DL — SIGNIFICANT CHANGE UP (ref 0.2–1.2)
BUN SERPL-MCNC: 49 MG/DL — HIGH (ref 7–23)
CALCIUM SERPL-MCNC: 8.9 MG/DL — SIGNIFICANT CHANGE UP (ref 8.4–10.5)
CHLORIDE SERPL-SCNC: 102 MMOL/L — SIGNIFICANT CHANGE UP (ref 96–108)
CO2 SERPL-SCNC: 23 MMOL/L — SIGNIFICANT CHANGE UP (ref 22–31)
CREAT SERPL-MCNC: 1.83 MG/DL — HIGH (ref 0.5–1.3)
CULTURE RESULTS: SIGNIFICANT CHANGE UP
EOSINOPHIL # BLD AUTO: 0.68 K/UL — HIGH (ref 0–0.5)
EOSINOPHIL NFR BLD AUTO: 7 % — HIGH (ref 0–6)
GLUCOSE SERPL-MCNC: 225 MG/DL — HIGH (ref 70–99)
GRAM STN FLD: SIGNIFICANT CHANGE UP
GRAM STN FLD: SIGNIFICANT CHANGE UP
HCT VFR BLD CALC: 28.4 % — LOW (ref 39–50)
HGB BLD-MCNC: 9.4 G/DL — LOW (ref 13–17)
IMM GRANULOCYTES NFR BLD AUTO: 0.4 % — SIGNIFICANT CHANGE UP (ref 0–1.5)
INR BLD: 1.06 RATIO — SIGNIFICANT CHANGE UP (ref 0.88–1.16)
LACTATE SERPL-SCNC: 1.7 MMOL/L — SIGNIFICANT CHANGE UP (ref 0.7–2)
LYMPHOCYTES # BLD AUTO: 0.92 K/UL — LOW (ref 1–3.3)
LYMPHOCYTES # BLD AUTO: 9.4 % — LOW (ref 13–44)
MCHC RBC-ENTMCNC: 29.9 PG — SIGNIFICANT CHANGE UP (ref 27–34)
MCHC RBC-ENTMCNC: 33.1 GM/DL — SIGNIFICANT CHANGE UP (ref 32–36)
MCV RBC AUTO: 90.4 FL — SIGNIFICANT CHANGE UP (ref 80–100)
METHOD TYPE: SIGNIFICANT CHANGE UP
MONOCYTES # BLD AUTO: 0.56 K/UL — SIGNIFICANT CHANGE UP (ref 0–0.9)
MONOCYTES NFR BLD AUTO: 5.7 % — SIGNIFICANT CHANGE UP (ref 2–14)
NEUTROPHILS # BLD AUTO: 7.56 K/UL — HIGH (ref 1.8–7.4)
NEUTROPHILS NFR BLD AUTO: 77.3 % — HIGH (ref 43–77)
NRBC # BLD: 0 /100 WBCS — SIGNIFICANT CHANGE UP (ref 0–0)
PLATELET # BLD AUTO: 138 K/UL — LOW (ref 150–400)
POTASSIUM SERPL-MCNC: 4.2 MMOL/L — SIGNIFICANT CHANGE UP (ref 3.5–5.3)
POTASSIUM SERPL-SCNC: 4.2 MMOL/L — SIGNIFICANT CHANGE UP (ref 3.5–5.3)
PROT SERPL-MCNC: 6.9 G/DL — SIGNIFICANT CHANGE UP (ref 6–8.3)
PROTHROM AB SERPL-ACNC: 11.6 SEC — SIGNIFICANT CHANGE UP (ref 10–12.9)
RBC # BLD: 3.14 M/UL — LOW (ref 4.2–5.8)
RBC # FLD: 14.7 % — HIGH (ref 10.3–14.5)
SODIUM SERPL-SCNC: 135 MMOL/L — SIGNIFICANT CHANGE UP (ref 135–145)
SPECIMEN SOURCE: SIGNIFICANT CHANGE UP
WBC # BLD: 9.78 K/UL — SIGNIFICANT CHANGE UP (ref 3.8–10.5)
WBC # FLD AUTO: 9.78 K/UL — SIGNIFICANT CHANGE UP (ref 3.8–10.5)

## 2019-07-05 PROCEDURE — 99284 EMERGENCY DEPT VISIT MOD MDM: CPT

## 2019-07-05 PROCEDURE — 80053 COMPREHEN METABOLIC PANEL: CPT

## 2019-07-05 PROCEDURE — 36415 COLL VENOUS BLD VENIPUNCTURE: CPT

## 2019-07-05 PROCEDURE — 87040 BLOOD CULTURE FOR BACTERIA: CPT

## 2019-07-05 PROCEDURE — 85730 THROMBOPLASTIN TIME PARTIAL: CPT

## 2019-07-05 PROCEDURE — 83605 ASSAY OF LACTIC ACID: CPT

## 2019-07-05 PROCEDURE — 85610 PROTHROMBIN TIME: CPT

## 2019-07-05 PROCEDURE — 85027 COMPLETE CBC AUTOMATED: CPT

## 2019-07-05 NOTE — ED POST DISCHARGE NOTE - DETAILS
spoke with pts wife, Amira Gagnon, pts emergency contact. informed her of BC results, need for pt to return to ED ASAP for re-evaluation, repeat BCs, possible admission/IV antibiotics. pts wife understands and agrees. will bring pt back to ED. -DIONY Arauz

## 2019-07-05 NOTE — ED ADULT NURSE NOTE - CHPI ED NUR SYMPTOMS NEG
no weakness/no pain/no vomiting/no decreased eating/drinking/no nausea/no tingling/no chills/no dizziness/no fever

## 2019-07-05 NOTE — ED POST DISCHARGE NOTE - ADDITIONAL DOCUMENTATION
Patient went to [facility] ED after instructed to return to the hospital. Re-evaluated, Given IV Fluids, felt better, labs were sent and patient discharged home. - Lon Hernández PA-C

## 2019-07-05 NOTE — ED POST DISCHARGE NOTE - RESULT SUMMARY
+ BC, prelim result showing gram positive cocci in clusters in aerobic bottle. chart reviewed, case discussed with Dr. Vo. pt needs to return to the ED for repeat testing/evaluation and possible IV antibiotics. - DIONY Arauz

## 2019-07-05 NOTE — ED ADULT NURSE NOTE - OBJECTIVE STATEMENT
74 yr old male walked in accompanied by wife for repeat labs and cultures; pt seen in [facility] ED yesterday and was called today with abnormal cultures; asked to return to ED for repeat labs; pt states he feels well; denies any complaints at this time 74 yr old male walked in accompanied by wife for repeat labs and cultures; pt seen in [facility] ED yesterday for generalized weakness and was called today with abnormal cultures; asked to return to ED for repeat labs; pt states he feels well; denies any complaints at this time

## 2019-07-05 NOTE — ED PROVIDER NOTE - OBJECTIVE STATEMENT
pt is a 73yo male with pmhx of DM, HTN, CVA, CAD s/p stents presents with abnormal labs. pt reports he was seen at [facility] hospital yesterday for lethargy and dehydration. pt advised symptoms have since improved. pt reports he was consulted today by ed and advised he has positive blood cultures so he was advised to come to ed for evaluation. pt is without complaints. pt denies fever, cp, sob, cough, n/v/d.\  pmd: [facility]

## 2019-07-05 NOTE — ED ADULT TRIAGE NOTE - CHIEF COMPLAINT QUOTE
He had blood culture done at [facility] yesterday and the ED doctor told me to go back and get repeat cultures.

## 2019-07-05 NOTE — ED PROVIDER NOTE - PROGRESS NOTE DETAILS
73 y/o M pt w/ PMHx prostate CA (radiation treatment, no chemotherapy) presents to the ED for eval of abnormal blood cultures. Pt reports he was seen at [facility] ED yesterday for weakness, was given IV hydration, felt better and was discharged home. Was called last night and advised that his blood cultures were abnormal and to come to ED for further eval. Pt denies incontinence, fever, CP, SOB or any other complaints at this time. PE normal. Abd normal, heart s1 s2 RRR, lungs clear. neuro intact.

## 2019-07-05 NOTE — ED ADULT NURSE NOTE - CHIEF COMPLAINT QUOTE
He had blood culture done at [facility] yesterday and the ED doctor told me to go back and get repeat cultures.

## 2019-07-06 VITALS
OXYGEN SATURATION: 99 % | DIASTOLIC BLOOD PRESSURE: 60 MMHG | HEART RATE: 65 BPM | TEMPERATURE: 98 F | RESPIRATION RATE: 16 BRPM | SYSTOLIC BLOOD PRESSURE: 101 MMHG

## 2019-07-06 LAB
CULTURE RESULTS: SIGNIFICANT CHANGE UP
ORGANISM # SPEC MICROSCOPIC CNT: SIGNIFICANT CHANGE UP
ORGANISM # SPEC MICROSCOPIC CNT: SIGNIFICANT CHANGE UP
SPECIMEN SOURCE: SIGNIFICANT CHANGE UP

## 2019-07-08 ENCOUNTER — OTHER (OUTPATIENT)
Age: 74
End: 2019-07-08

## 2019-07-09 LAB
CULTURE RESULTS: SIGNIFICANT CHANGE UP
SPECIMEN SOURCE: SIGNIFICANT CHANGE UP

## 2019-07-11 LAB
CULTURE RESULTS: SIGNIFICANT CHANGE UP
CULTURE RESULTS: SIGNIFICANT CHANGE UP
SPECIMEN SOURCE: SIGNIFICANT CHANGE UP
SPECIMEN SOURCE: SIGNIFICANT CHANGE UP

## 2019-07-19 ENCOUNTER — TRANSCRIPTION ENCOUNTER (OUTPATIENT)
Age: 74
End: 2019-07-19

## 2019-07-19 ENCOUNTER — INPATIENT (INPATIENT)
Facility: HOSPITAL | Age: 74
LOS: 4 days | Discharge: HOME | DRG: 57 | End: 2019-07-24
Attending: INTERNAL MEDICINE | Admitting: INTERNAL MEDICINE
Payer: MEDICARE

## 2019-07-19 VITALS
TEMPERATURE: 97 F | RESPIRATION RATE: 16 BRPM | HEART RATE: 60 BPM | OXYGEN SATURATION: 100 % | WEIGHT: 162.92 LBS | SYSTOLIC BLOOD PRESSURE: 126 MMHG | DIASTOLIC BLOOD PRESSURE: 77 MMHG | HEIGHT: 69 IN

## 2019-07-19 DIAGNOSIS — N20.0 CALCULUS OF KIDNEY: Chronic | ICD-10-CM

## 2019-07-19 DIAGNOSIS — G20 PARKINSON'S DISEASE: ICD-10-CM

## 2019-07-19 DIAGNOSIS — I63.9 CEREBRAL INFARCTION, UNSPECIFIED: ICD-10-CM

## 2019-07-19 DIAGNOSIS — Z29.9 ENCOUNTER FOR PROPHYLACTIC MEASURES, UNSPECIFIED: ICD-10-CM

## 2019-07-19 DIAGNOSIS — E11.9 TYPE 2 DIABETES MELLITUS WITHOUT COMPLICATIONS: ICD-10-CM

## 2019-07-19 DIAGNOSIS — C61 MALIGNANT NEOPLASM OF PROSTATE: ICD-10-CM

## 2019-07-19 DIAGNOSIS — Z98.49 CATARACT EXTRACTION STATUS, UNSPECIFIED EYE: Chronic | ICD-10-CM

## 2019-07-19 DIAGNOSIS — Z95.5 PRESENCE OF CORONARY ANGIOPLASTY IMPLANT AND GRAFT: Chronic | ICD-10-CM

## 2019-07-19 DIAGNOSIS — I25.10 ATHEROSCLEROTIC HEART DISEASE OF NATIVE CORONARY ARTERY WITHOUT ANGINA PECTORIS: ICD-10-CM

## 2019-07-19 DIAGNOSIS — Z98.89 OTHER SPECIFIED POSTPROCEDURAL STATES: Chronic | ICD-10-CM

## 2019-07-19 DIAGNOSIS — R42 DIZZINESS AND GIDDINESS: ICD-10-CM

## 2019-07-19 DIAGNOSIS — Z95.818 PRESENCE OF OTHER CARDIAC IMPLANTS AND GRAFTS: Chronic | ICD-10-CM

## 2019-07-19 LAB
ALBUMIN SERPL ELPH-MCNC: 4.5 G/DL — SIGNIFICANT CHANGE UP (ref 3.3–5)
ALP SERPL-CCNC: 146 U/L — HIGH (ref 40–120)
ALT FLD-CCNC: <5 U/L — LOW (ref 10–45)
ANION GAP SERPL CALC-SCNC: 14 MMOL/L — SIGNIFICANT CHANGE UP (ref 5–17)
APPEARANCE UR: CLEAR — SIGNIFICANT CHANGE UP
AST SERPL-CCNC: 11 U/L — SIGNIFICANT CHANGE UP (ref 10–40)
BACTERIA # UR AUTO: NEGATIVE — SIGNIFICANT CHANGE UP
BASOPHILS # BLD AUTO: 0.1 K/UL — SIGNIFICANT CHANGE UP (ref 0–0.2)
BASOPHILS NFR BLD AUTO: 0.5 % — SIGNIFICANT CHANGE UP (ref 0–2)
BILIRUB SERPL-MCNC: 0.6 MG/DL — SIGNIFICANT CHANGE UP (ref 0.2–1.2)
BILIRUB UR-MCNC: NEGATIVE — SIGNIFICANT CHANGE UP
BUN SERPL-MCNC: 43 MG/DL — HIGH (ref 7–23)
CALCIUM SERPL-MCNC: 9.6 MG/DL — SIGNIFICANT CHANGE UP (ref 8.4–10.5)
CHLORIDE SERPL-SCNC: 97 MMOL/L — SIGNIFICANT CHANGE UP (ref 96–108)
CO2 SERPL-SCNC: 23 MMOL/L — SIGNIFICANT CHANGE UP (ref 22–31)
COLOR SPEC: SIGNIFICANT CHANGE UP
CREAT SERPL-MCNC: 1.7 MG/DL — HIGH (ref 0.5–1.3)
DIFF PNL FLD: NEGATIVE — SIGNIFICANT CHANGE UP
EOSINOPHIL # BLD AUTO: 1.3 K/UL — HIGH (ref 0–0.5)
EOSINOPHIL NFR BLD AUTO: 11.9 % — HIGH (ref 0–6)
EPI CELLS # UR: 0 /HPF — SIGNIFICANT CHANGE UP
GLUCOSE BLDC GLUCOMTR-MCNC: 163 MG/DL — HIGH (ref 70–99)
GLUCOSE SERPL-MCNC: 89 MG/DL — SIGNIFICANT CHANGE UP (ref 70–99)
GLUCOSE UR QL: NEGATIVE — SIGNIFICANT CHANGE UP
HCT VFR BLD CALC: 31.8 % — LOW (ref 39–50)
HGB BLD-MCNC: 11.1 G/DL — LOW (ref 13–17)
HYALINE CASTS # UR AUTO: 0 /LPF — SIGNIFICANT CHANGE UP (ref 0–2)
KETONES UR-MCNC: NEGATIVE — SIGNIFICANT CHANGE UP
LEUKOCYTE ESTERASE UR-ACNC: NEGATIVE — SIGNIFICANT CHANGE UP
LYMPHOCYTES # BLD AUTO: 1.2 K/UL — SIGNIFICANT CHANGE UP (ref 1–3.3)
LYMPHOCYTES # BLD AUTO: 11.3 % — LOW (ref 13–44)
MCHC RBC-ENTMCNC: 31.2 PG — SIGNIFICANT CHANGE UP (ref 27–34)
MCHC RBC-ENTMCNC: 34.8 GM/DL — SIGNIFICANT CHANGE UP (ref 32–36)
MCV RBC AUTO: 89.6 FL — SIGNIFICANT CHANGE UP (ref 80–100)
MONOCYTES # BLD AUTO: 0.8 K/UL — SIGNIFICANT CHANGE UP (ref 0–0.9)
MONOCYTES NFR BLD AUTO: 7.7 % — SIGNIFICANT CHANGE UP (ref 2–14)
NEUTROPHILS # BLD AUTO: 7.4 K/UL — SIGNIFICANT CHANGE UP (ref 1.8–7.4)
NEUTROPHILS NFR BLD AUTO: 68.6 % — SIGNIFICANT CHANGE UP (ref 43–77)
NITRITE UR-MCNC: NEGATIVE — SIGNIFICANT CHANGE UP
PH UR: 6 — SIGNIFICANT CHANGE UP (ref 5–8)
PLATELET # BLD AUTO: 143 K/UL — LOW (ref 150–400)
POTASSIUM SERPL-MCNC: 3.9 MMOL/L — SIGNIFICANT CHANGE UP (ref 3.5–5.3)
POTASSIUM SERPL-SCNC: 3.9 MMOL/L — SIGNIFICANT CHANGE UP (ref 3.5–5.3)
PROT SERPL-MCNC: 7.4 G/DL — SIGNIFICANT CHANGE UP (ref 6–8.3)
PROT UR-MCNC: ABNORMAL
RBC # BLD: 3.55 M/UL — LOW (ref 4.2–5.8)
RBC # FLD: 14 % — SIGNIFICANT CHANGE UP (ref 10.3–14.5)
RBC CASTS # UR COMP ASSIST: 1 /HPF — SIGNIFICANT CHANGE UP (ref 0–4)
SODIUM SERPL-SCNC: 134 MMOL/L — LOW (ref 135–145)
SP GR SPEC: 1.01 — SIGNIFICANT CHANGE UP (ref 1.01–1.02)
UROBILINOGEN FLD QL: NEGATIVE — SIGNIFICANT CHANGE UP
WBC # BLD: 10.7 K/UL — HIGH (ref 3.8–10.5)
WBC # FLD AUTO: 10.7 K/UL — HIGH (ref 3.8–10.5)
WBC UR QL: 1 /HPF — SIGNIFICANT CHANGE UP (ref 0–5)

## 2019-07-19 PROCEDURE — 99285 EMERGENCY DEPT VISIT HI MDM: CPT | Mod: GC

## 2019-07-19 PROCEDURE — 99239 HOSP IP/OBS DSCHRG MGMT >30: CPT

## 2019-07-19 PROCEDURE — 93010 ELECTROCARDIOGRAM REPORT: CPT | Mod: GC

## 2019-07-19 RX ORDER — CARBIDOPA AND LEVODOPA 25; 100 MG/1; MG/1
1 TABLET ORAL EVERY 6 HOURS
Refills: 0 | Status: DISCONTINUED | OUTPATIENT
Start: 2019-07-19 | End: 2019-07-19

## 2019-07-19 RX ORDER — DEXTROSE 50 % IN WATER 50 %
12.5 SYRINGE (ML) INTRAVENOUS ONCE
Refills: 0 | Status: DISCONTINUED | OUTPATIENT
Start: 2019-07-19 | End: 2019-07-24

## 2019-07-19 RX ORDER — GLUCAGON INJECTION, SOLUTION 0.5 MG/.1ML
1 INJECTION, SOLUTION SUBCUTANEOUS ONCE
Refills: 0 | Status: DISCONTINUED | OUTPATIENT
Start: 2019-07-19 | End: 2019-07-24

## 2019-07-19 RX ORDER — PREGABALIN 225 MG/1
1000 CAPSULE ORAL DAILY
Refills: 0 | Status: DISCONTINUED | OUTPATIENT
Start: 2019-07-19 | End: 2019-07-24

## 2019-07-19 RX ORDER — DEXTROSE 50 % IN WATER 50 %
15 SYRINGE (ML) INTRAVENOUS ONCE
Refills: 0 | Status: DISCONTINUED | OUTPATIENT
Start: 2019-07-19 | End: 2019-07-24

## 2019-07-19 RX ORDER — ATORVASTATIN CALCIUM 80 MG/1
80 TABLET, FILM COATED ORAL AT BEDTIME
Refills: 0 | Status: DISCONTINUED | OUTPATIENT
Start: 2019-07-19 | End: 2019-07-24

## 2019-07-19 RX ORDER — DEXTROSE 50 % IN WATER 50 %
25 SYRINGE (ML) INTRAVENOUS ONCE
Refills: 0 | Status: DISCONTINUED | OUTPATIENT
Start: 2019-07-19 | End: 2019-07-24

## 2019-07-19 RX ORDER — ALLOPURINOL 300 MG
300 TABLET ORAL DAILY
Refills: 0 | Status: DISCONTINUED | OUTPATIENT
Start: 2019-07-19 | End: 2019-07-24

## 2019-07-19 RX ORDER — INSULIN GLARGINE 100 [IU]/ML
10 INJECTION, SOLUTION SUBCUTANEOUS EVERY MORNING
Refills: 0 | Status: DISCONTINUED | OUTPATIENT
Start: 2019-07-19 | End: 2019-07-19

## 2019-07-19 RX ORDER — INSULIN GLARGINE 100 [IU]/ML
10 INJECTION, SOLUTION SUBCUTANEOUS AT BEDTIME
Refills: 0 | Status: DISCONTINUED | OUTPATIENT
Start: 2019-07-19 | End: 2019-07-20

## 2019-07-19 RX ORDER — INSULIN LISPRO 100/ML
3 VIAL (ML) SUBCUTANEOUS
Refills: 0 | Status: DISCONTINUED | OUTPATIENT
Start: 2019-07-19 | End: 2019-07-24

## 2019-07-19 RX ORDER — CLOPIDOGREL BISULFATE 75 MG/1
75 TABLET, FILM COATED ORAL DAILY
Refills: 0 | Status: DISCONTINUED | OUTPATIENT
Start: 2019-07-19 | End: 2019-07-24

## 2019-07-19 RX ORDER — CARBIDOPA AND LEVODOPA 25; 100 MG/1; MG/1
2 TABLET ORAL EVERY 6 HOURS
Refills: 0 | Status: DISCONTINUED | OUTPATIENT
Start: 2019-07-19 | End: 2019-07-24

## 2019-07-19 RX ORDER — SODIUM CHLORIDE 9 MG/ML
1000 INJECTION INTRAMUSCULAR; INTRAVENOUS; SUBCUTANEOUS ONCE
Refills: 0 | Status: COMPLETED | OUTPATIENT
Start: 2019-07-19 | End: 2019-07-19

## 2019-07-19 RX ORDER — ENOXAPARIN SODIUM 100 MG/ML
40 INJECTION SUBCUTANEOUS DAILY
Refills: 0 | Status: DISCONTINUED | OUTPATIENT
Start: 2019-07-19 | End: 2019-07-24

## 2019-07-19 RX ORDER — INSULIN LISPRO 100/ML
VIAL (ML) SUBCUTANEOUS
Refills: 0 | Status: DISCONTINUED | OUTPATIENT
Start: 2019-07-19 | End: 2019-07-24

## 2019-07-19 RX ORDER — LEVOTHYROXINE SODIUM 125 MCG
75 TABLET ORAL DAILY
Refills: 0 | Status: DISCONTINUED | OUTPATIENT
Start: 2019-07-19 | End: 2019-07-24

## 2019-07-19 RX ORDER — SODIUM CHLORIDE 9 MG/ML
1000 INJECTION, SOLUTION INTRAVENOUS
Refills: 0 | Status: DISCONTINUED | OUTPATIENT
Start: 2019-07-19 | End: 2019-07-24

## 2019-07-19 RX ORDER — PRAMIPEXOLE DIHYDROCHLORIDE 0.12 MG/1
0.12 TABLET ORAL EVERY 6 HOURS
Refills: 0 | Status: DISCONTINUED | OUTPATIENT
Start: 2019-07-19 | End: 2019-07-24

## 2019-07-19 RX ADMIN — SODIUM CHLORIDE 1000 MILLILITER(S): 9 INJECTION INTRAMUSCULAR; INTRAVENOUS; SUBCUTANEOUS at 15:55

## 2019-07-19 NOTE — H&P ADULT - PROBLEM SELECTOR PLAN 4
Pt takes latus 28 units daily and hulamog 5/3/5 TID FS 80s in ED   will start pt on lantus 10 units in am and humalog 3 units TID  insuline sliding scale   monitor FS

## 2019-07-19 NOTE — H&P ADULT - NSHPSOCIALHISTORY_GEN_ALL_CORE
Denies using tobacco, alcohol and illciit drug use. He lives at home with wife, since last ED visit 2 wks ago has been ambulating with a walker.

## 2019-07-19 NOTE — H&P ADULT - PROBLEM SELECTOR PLAN 7
Diet: carb consistent  DVT ppx: lovenox, noted to have mild thrombocytopenis by plt >100,000, monitor close may need to d/c lovenox if plt drop below 100,000

## 2019-07-19 NOTE — ED PROVIDER NOTE - CLINICAL SUMMARY MEDICAL DECISION MAKING FREE TEXT BOX
75yo male with pmhx of DM, HTN, CVA, CAD s/p stents. Dizziness of unclear etiology. Non focal neuro exam. possibly dehydration vs vertigo vs nph vs infectious vs metabolic. Will get labs, urine, likely a/m for further neuro eval. 73yo male with pmhx of DM, HTN, CVA, CAD s/p stents. Dizziness of unclear etiology. Non focal neuro exam. possibly dehydration vs vertigo vs nph vs infectious vs metabolic. Will get labs, urine, likely a/m for further neuro eval.    Oli: See attending statement below

## 2019-07-19 NOTE — ED ADULT NURSE NOTE - CHPI ED NUR SYMPTOMS NEG
no loss of consciousness/no numbness/no nausea/no blurred vision/no confusion/no change in level of consciousness/no vomiting/no fever

## 2019-07-19 NOTE — H&P ADULT - NSHPLABSRESULTS_GEN_ALL_CORE
Labs personally reviewed:                          11.1   10.7  )-----------( 143      ( 19 Jul 2019 14:36 )             31.8       07-19    134<L>  |  97  |  43<H>  ----------------------------<  89  3.9   |  23  |  1.70<H>    Ca    9.6      19 Jul 2019 14:36    TPro  7.4  /  Alb  4.5  /  TBili  0.6  /  DBili  x   /  AST  11  /  ALT  <5<L>  /  AlkPhos  146<H>  07-19            Imaging personally reviewed:  CT head   Redemonstration of volume loss and microvascular disease, no hemorrhage,   extra-axial collections, or shift of the midline structures. If symptoms   persist consider follow-up head CT or MR if no contraindications..    EKG personally reviewed: NSR HR 80

## 2019-07-19 NOTE — H&P ADULT - PROBLEM SELECTOR PLAN 1
d/d include but not limited to known orthostatic hypotension, worsening parkinson's, CVA? as per wife prior CVA was dx with MRI not picked up by CT scan, cardiac causes less likely no s/s of cardiac cause     - hold Anti-HTN meds  - neurology consult in AM   - consider MRI brain   - c/w Carbidopa-Levodopa and pramipexole

## 2019-07-19 NOTE — H&P ADULT - PROBLEM SELECTOR PLAN 5
currently receiving RT last dose yesterday , Per wife pt has 2 cycles remaining  per wife no known metastatic disease.

## 2019-07-19 NOTE — H&P ADULT - ASSESSMENT
73yo male with pmhx of IDDM, orthostatic HTN, CVA (partial R eye blindness), Prostate CA currently receiving RT, CAD s/p stents, Parkinson and nephrolithiasis p/w dizziness and unstable gait d/d orthostatic hypotension vs worsening parkinson's vs NPH

## 2019-07-19 NOTE — ED ADULT NURSE NOTE - OBJECTIVE STATEMENT
Male 74 years old with history of Prostate Cancer on radiation, came in for dizziness, frequent fall and off balance on his gait. As per wife pt was here a week ago because of dehydration. After 2 days, wife noticed that his off balance and when he gets up he's holding on things and feels dizzy. Male 74 years old with history of Prostate Cancer on radiation, DM and HTN came in for dizziness, frequent fall and off balance on his gait. As per wife pt was here 3 weeks ago because of dehydration. After 2 days, wife noticed that his off balance and when he gets up he's holding on things, feels dizzy, and drifting to one side when he walks. Pt went to see his PMD and found to be orthostatic. This morning pt fell from the bed, unwitnessed. Wife found him on the floor. Denies chest pain, sob, N/V, and urinary symptoms. Safety maintained. Will continue to reassess.

## 2019-07-19 NOTE — H&P ADULT - NSHPREVIEWOFSYSTEMS_GEN_ALL_CORE
REVIEW OF SYSTEMS:    CONSTITUTIONAL: No weakness, fevers or chills  EYES: no blurry vision or eye pain.   ENT: No throat pain. No dysphagia.    NECK: No pain or stiffness  RESPIRATORY: No cough, wheezing, hemoptysis; No shortness of breath  CARDIOVASCULAR: No chest pain or palpitations.  GASTROINTESTINAL: No abdominal pain. No nausea or vomiting; No diarrhea or constipation. No melena or hematochezia.  GENITOURINARY: No dysuria, frequency or hematuria  NEUROLOGICAL: No numbness or weakness. No dizziness or falls.   SKIN: No itching, burning, rashes, or lesions.   LYMPHATIC: No masses or swelling.   All other review of systems is negative unless indicated above.

## 2019-07-19 NOTE — H&P ADULT - NSICDXPASTSURGICALHX_GEN_ALL_CORE_FT
PAST SURGICAL HISTORY:  Kidney stones 9/16/19    S/P appendectomy age 16    S/P cataract surgery     S/P coronary artery stent placement 12/21/10    S/P tonsillectomy and adenoidectomy at age 31    Status post placement of implantable loop recorder May 2015

## 2019-07-19 NOTE — H&P ADULT - PROBLEM SELECTOR PLAN 3
c/w Plavix  per wife ASA on hold by cardiology, obtain collateral from cardiologist in AM  no active symptoms

## 2019-07-19 NOTE — ED PROVIDER NOTE - PHYSICAL EXAMINATION
Gen: WNWD NAD  HEENT: NCAT PERRL EOMI normal pharynx R adams beating nystagmus  Neck: supple  CV: RRR, no murmur  Lung: CTA BL  Abd: +BS soft NTND  Ext: wwp, palp pulses, FROMx4, no cce  Neuro: Awake alert CN grossly intact, sensation intact, motor 5/5 throughout

## 2019-07-19 NOTE — ED PROVIDER NOTE - OBJECTIVE STATEMENT
75yo male with pmhx of DM, HTN, CVA (partial R eye blindness), CAD s/p stents p/w dizziness. Patient had similar presentation 4 weeks ago in the ER. Reports dizziness (unclear if vertigo vs lightheadedness) worse with standing. HAs been found to be orthostatic in outpatient medical visits. Wife reports labile blood pressures between 80 - 170 systolic. Recently decreased atenolol to QOD and d/c'd amlodipine in light of recent hypotensive events. Patient reports feeling unsteady on his feet and may be drifting to one side when he walks. Family reports that his symptoms are intermittent and sometimes he is asymptomatic and other times, he can barely walk a straight line. Came in today because patient rolled off of bed onto floor and family thinks symptoms are worsening. ED workup a few weeks ago unremarkable including dry head CT/basic labs. Denies recent infectious signs/symptoms, head injury, hearing changes, vision changes, chest pain, palpitations. 75yo male with pmhx of DM, HTN, CVA (partial R eye blindness), CAD s/p stents p/w dizziness. Patient had similar presentation 4 weeks ago in the ER. Reports dizziness (unclear if vertigo vs lightheadedness) worse with standing. HAs been found to be orthostatic in outpatient medical visits. Wife reports labile blood pressures between 80 - 170 systolic. Recently decreased atenolol to QOD and d/c'd amlodipine in light of recent hypotensive events. Patient reports feeling unsteady on his feet and may be drifting to one side when he walks. Family reports that his symptoms are intermittent and sometimes he is asymptomatic and other times, he can barely walk a straight line. Came in today because patient rolled off of bed onto floor and family thinks symptoms are worsening. ED workup a few weeks ago unremarkable including dry head CT/basic labs. Denies recent infectious signs/symptoms, head injury, hearing changes, vision changes, chest pain, palpitations.    Neurologist: Norm

## 2019-07-19 NOTE — ED ADULT NURSE REASSESSMENT NOTE - NS ED NURSE REASSESS COMMENT FT1
Received report from ED RN Gita; patient A&ox3, afebrile- VSS, 20 g IV in L AC patent and site WNL. Sinus rhythm on the monitor. Patient admitted and waiting for bed assignment.

## 2019-07-19 NOTE — ED PROVIDER NOTE - ATTENDING CONTRIBUTION TO CARE
74y M hx of DM, HTN, CVA, CAD w stents, Parkinson's here with c/o dizziness/lightheadedness, ataxia intermittently x weeks, now worsening. Wife provides majority of hx. Reporst sx are worse upon standing. Pt noted to have labile BPs and orthostatic hypotension as OP. Was DC off amlodipine and ASA recently, Atenolol was changed to QOD, due to low BPs. Pt states rolled out of bed today, episode unwitnessed. Pt denies head strike or LOC. On exam pt is awake, alert, NAD, NCAT, PERRL, EOMI, R-wrd beating nystagmus, No midline spinal TTP, RRR, lungs CTA, abd soft ntnd, ext wwp, moves all extremities w full strength HINTS exam suggestive of periph cause for dizziness, though pt does have signif risk factors for central causes of dizziness (CAD, CVA). Dizziness/orthostasis also possibly due to dehydration, electrolyte abn, medication related, autonomic instability related to Parkinson's. Will check labs, EKG, UA. Had CT head july 4th, no ext signs of trauma currently will defer. Will likely need MRI as IP and neuro eval. Private neuro is Dr Zheng.

## 2019-07-19 NOTE — H&P ADULT - NSICDXPASTMEDICALHX_GEN_ALL_CORE_FT
PAST MEDICAL HISTORY:  CAD (coronary artery disease)     CRI (chronic renal insufficiency)     CVA (cerebral infarction)     Diabetes     Hyperlipidemia     Hypertension     Kidney stone     Merkel cell carcinoma of face     SBO (small bowel obstruction) sept 2015

## 2019-07-20 LAB
GLUCOSE BLDC GLUCOMTR-MCNC: 126 MG/DL — HIGH (ref 70–99)
GLUCOSE BLDC GLUCOMTR-MCNC: 126 MG/DL — HIGH (ref 70–99)
GLUCOSE BLDC GLUCOMTR-MCNC: 173 MG/DL — HIGH (ref 70–99)
GLUCOSE BLDC GLUCOMTR-MCNC: 179 MG/DL — HIGH (ref 70–99)
GLUCOSE BLDC GLUCOMTR-MCNC: 209 MG/DL — HIGH (ref 70–99)
HBA1C BLD-MCNC: 7.4 % — HIGH (ref 4–5.6)
HCV AB S/CO SERPL IA: 0.08 S/CO — SIGNIFICANT CHANGE UP (ref 0–0.99)
HCV AB SERPL-IMP: SIGNIFICANT CHANGE UP
OB PNL STL: NEGATIVE — SIGNIFICANT CHANGE UP

## 2019-07-20 RX ORDER — SENNA PLUS 8.6 MG/1
2 TABLET ORAL AT BEDTIME
Refills: 0 | Status: DISCONTINUED | OUTPATIENT
Start: 2019-07-20 | End: 2019-07-24

## 2019-07-20 RX ORDER — DOCUSATE SODIUM 100 MG
100 CAPSULE ORAL THREE TIMES A DAY
Refills: 0 | Status: DISCONTINUED | OUTPATIENT
Start: 2019-07-20 | End: 2019-07-24

## 2019-07-20 RX ORDER — POLYETHYLENE GLYCOL 3350 17 G/17G
17 POWDER, FOR SOLUTION ORAL DAILY
Refills: 0 | Status: DISCONTINUED | OUTPATIENT
Start: 2019-07-20 | End: 2019-07-24

## 2019-07-20 RX ORDER — INSULIN GLARGINE 100 [IU]/ML
10 INJECTION, SOLUTION SUBCUTANEOUS EVERY MORNING
Refills: 0 | Status: DISCONTINUED | OUTPATIENT
Start: 2019-07-20 | End: 2019-07-24

## 2019-07-20 RX ADMIN — Medication 3 UNIT(S): at 08:36

## 2019-07-20 RX ADMIN — PRAMIPEXOLE DIHYDROCHLORIDE 0.12 MILLIGRAM(S): 0.12 TABLET ORAL at 17:50

## 2019-07-20 RX ADMIN — Medication 75 MICROGRAM(S): at 06:21

## 2019-07-20 RX ADMIN — CARBIDOPA AND LEVODOPA 2 TABLET(S): 25; 100 TABLET ORAL at 00:16

## 2019-07-20 RX ADMIN — CARBIDOPA AND LEVODOPA 2 TABLET(S): 25; 100 TABLET ORAL at 06:21

## 2019-07-20 RX ADMIN — PRAMIPEXOLE DIHYDROCHLORIDE 0.12 MILLIGRAM(S): 0.12 TABLET ORAL at 23:19

## 2019-07-20 RX ADMIN — CARBIDOPA AND LEVODOPA 2 TABLET(S): 25; 100 TABLET ORAL at 12:49

## 2019-07-20 RX ADMIN — ENOXAPARIN SODIUM 40 MILLIGRAM(S): 100 INJECTION SUBCUTANEOUS at 12:47

## 2019-07-20 RX ADMIN — ATORVASTATIN CALCIUM 80 MILLIGRAM(S): 80 TABLET, FILM COATED ORAL at 00:16

## 2019-07-20 RX ADMIN — Medication 3 UNIT(S): at 12:46

## 2019-07-20 RX ADMIN — PRAMIPEXOLE DIHYDROCHLORIDE 0.12 MILLIGRAM(S): 0.12 TABLET ORAL at 12:48

## 2019-07-20 RX ADMIN — ATORVASTATIN CALCIUM 80 MILLIGRAM(S): 80 TABLET, FILM COATED ORAL at 21:58

## 2019-07-20 RX ADMIN — CARBIDOPA AND LEVODOPA 2 TABLET(S): 25; 100 TABLET ORAL at 17:50

## 2019-07-20 RX ADMIN — INSULIN GLARGINE 10 UNIT(S): 100 INJECTION, SOLUTION SUBCUTANEOUS at 07:36

## 2019-07-20 RX ADMIN — Medication 1: at 12:45

## 2019-07-20 RX ADMIN — CLOPIDOGREL BISULFATE 75 MILLIGRAM(S): 75 TABLET, FILM COATED ORAL at 12:48

## 2019-07-20 RX ADMIN — CARBIDOPA AND LEVODOPA 2 TABLET(S): 25; 100 TABLET ORAL at 23:19

## 2019-07-20 RX ADMIN — Medication 300 MILLIGRAM(S): at 12:48

## 2019-07-20 RX ADMIN — PREGABALIN 1000 MICROGRAM(S): 225 CAPSULE ORAL at 12:49

## 2019-07-20 RX ADMIN — PRAMIPEXOLE DIHYDROCHLORIDE 0.12 MILLIGRAM(S): 0.12 TABLET ORAL at 00:16

## 2019-07-20 RX ADMIN — Medication 3 UNIT(S): at 17:02

## 2019-07-20 RX ADMIN — PRAMIPEXOLE DIHYDROCHLORIDE 0.12 MILLIGRAM(S): 0.12 TABLET ORAL at 06:21

## 2019-07-20 RX ADMIN — Medication 2: at 17:01

## 2019-07-21 LAB
GLUCOSE BLDC GLUCOMTR-MCNC: 146 MG/DL — HIGH (ref 70–99)
GLUCOSE BLDC GLUCOMTR-MCNC: 163 MG/DL — HIGH (ref 70–99)
GLUCOSE BLDC GLUCOMTR-MCNC: 191 MG/DL — HIGH (ref 70–99)
GLUCOSE BLDC GLUCOMTR-MCNC: 247 MG/DL — HIGH (ref 70–99)

## 2019-07-21 PROCEDURE — 70551 MRI BRAIN STEM W/O DYE: CPT | Mod: 26

## 2019-07-21 RX ADMIN — INSULIN GLARGINE 10 UNIT(S): 100 INJECTION, SOLUTION SUBCUTANEOUS at 08:39

## 2019-07-21 RX ADMIN — PREGABALIN 1000 MICROGRAM(S): 225 CAPSULE ORAL at 11:43

## 2019-07-21 RX ADMIN — CLOPIDOGREL BISULFATE 75 MILLIGRAM(S): 75 TABLET, FILM COATED ORAL at 11:43

## 2019-07-21 RX ADMIN — POLYETHYLENE GLYCOL 3350 17 GRAM(S): 17 POWDER, FOR SOLUTION ORAL at 11:43

## 2019-07-21 RX ADMIN — Medication 100 MILLIGRAM(S): at 23:05

## 2019-07-21 RX ADMIN — CARBIDOPA AND LEVODOPA 2 TABLET(S): 25; 100 TABLET ORAL at 17:08

## 2019-07-21 RX ADMIN — CARBIDOPA AND LEVODOPA 2 TABLET(S): 25; 100 TABLET ORAL at 06:15

## 2019-07-21 RX ADMIN — CARBIDOPA AND LEVODOPA 2 TABLET(S): 25; 100 TABLET ORAL at 23:05

## 2019-07-21 RX ADMIN — Medication 3 UNIT(S): at 08:29

## 2019-07-21 RX ADMIN — Medication 300 MILLIGRAM(S): at 11:42

## 2019-07-21 RX ADMIN — PRAMIPEXOLE DIHYDROCHLORIDE 0.12 MILLIGRAM(S): 0.12 TABLET ORAL at 11:43

## 2019-07-21 RX ADMIN — Medication 1: at 08:29

## 2019-07-21 RX ADMIN — PRAMIPEXOLE DIHYDROCHLORIDE 0.12 MILLIGRAM(S): 0.12 TABLET ORAL at 23:05

## 2019-07-21 RX ADMIN — ATORVASTATIN CALCIUM 80 MILLIGRAM(S): 80 TABLET, FILM COATED ORAL at 23:05

## 2019-07-21 RX ADMIN — Medication 3 UNIT(S): at 12:22

## 2019-07-21 RX ADMIN — SENNA PLUS 2 TABLET(S): 8.6 TABLET ORAL at 23:05

## 2019-07-21 RX ADMIN — CARBIDOPA AND LEVODOPA 2 TABLET(S): 25; 100 TABLET ORAL at 11:43

## 2019-07-21 RX ADMIN — Medication 2: at 12:22

## 2019-07-21 RX ADMIN — PRAMIPEXOLE DIHYDROCHLORIDE 0.12 MILLIGRAM(S): 0.12 TABLET ORAL at 17:08

## 2019-07-21 RX ADMIN — ENOXAPARIN SODIUM 40 MILLIGRAM(S): 100 INJECTION SUBCUTANEOUS at 11:43

## 2019-07-21 RX ADMIN — Medication 75 MICROGRAM(S): at 06:15

## 2019-07-21 RX ADMIN — Medication 3 UNIT(S): at 17:09

## 2019-07-21 RX ADMIN — PRAMIPEXOLE DIHYDROCHLORIDE 0.12 MILLIGRAM(S): 0.12 TABLET ORAL at 06:15

## 2019-07-21 NOTE — PROVIDER CONTACT NOTE (OTHER) - ASSESSMENT
patient asymptomatic 151/82mmhg,93/59 mmhg
Patient alert, asymptomatic. BP and HR changes as noted in flowsheet, + Orthostatic.

## 2019-07-21 NOTE — PROVIDER CONTACT NOTE (OTHER) - BACKGROUND
73yo male with pmhx of DM, HTN, CVA (partial R eye blindness), CAD s/p stents p/w dizziness. Patient had similar presentation 4 weeks ago in the
74 year old male presenting with dizziness and PMH of CVA, HTN, T2DM, Parkinson's disease

## 2019-07-22 LAB
ALBUMIN SERPL ELPH-MCNC: 3.9 G/DL — SIGNIFICANT CHANGE UP (ref 3.3–5)
ALP SERPL-CCNC: 126 U/L — HIGH (ref 40–120)
ALT FLD-CCNC: <5 U/L — LOW (ref 10–45)
ANION GAP SERPL CALC-SCNC: 16 MMOL/L — SIGNIFICANT CHANGE UP (ref 5–17)
AST SERPL-CCNC: 10 U/L — SIGNIFICANT CHANGE UP (ref 10–40)
BASOPHILS # BLD AUTO: 0.03 K/UL — SIGNIFICANT CHANGE UP (ref 0–0.2)
BASOPHILS NFR BLD AUTO: 0.4 % — SIGNIFICANT CHANGE UP (ref 0–2)
BILIRUB SERPL-MCNC: 0.6 MG/DL — SIGNIFICANT CHANGE UP (ref 0.2–1.2)
BUN SERPL-MCNC: 42 MG/DL — HIGH (ref 7–23)
CALCIUM SERPL-MCNC: 9.6 MG/DL — SIGNIFICANT CHANGE UP (ref 8.4–10.5)
CHLORIDE SERPL-SCNC: 102 MMOL/L — SIGNIFICANT CHANGE UP (ref 96–108)
CO2 SERPL-SCNC: 22 MMOL/L — SIGNIFICANT CHANGE UP (ref 22–31)
CREAT SERPL-MCNC: 1.7 MG/DL — HIGH (ref 0.5–1.3)
EOSINOPHIL # BLD AUTO: 1.2 K/UL — HIGH (ref 0–0.5)
EOSINOPHIL NFR BLD AUTO: 14.8 % — HIGH (ref 0–6)
GLUCOSE BLDC GLUCOMTR-MCNC: 108 MG/DL — HIGH (ref 70–99)
GLUCOSE BLDC GLUCOMTR-MCNC: 109 MG/DL — HIGH (ref 70–99)
GLUCOSE BLDC GLUCOMTR-MCNC: 130 MG/DL — HIGH (ref 70–99)
GLUCOSE BLDC GLUCOMTR-MCNC: 157 MG/DL — HIGH (ref 70–99)
GLUCOSE BLDC GLUCOMTR-MCNC: 188 MG/DL — HIGH (ref 70–99)
GLUCOSE SERPL-MCNC: 152 MG/DL — HIGH (ref 70–99)
HCT VFR BLD CALC: 30.6 % — LOW (ref 39–50)
HGB BLD-MCNC: 10.1 G/DL — LOW (ref 13–17)
IMM GRANULOCYTES NFR BLD AUTO: 0.4 % — SIGNIFICANT CHANGE UP (ref 0–1.5)
LYMPHOCYTES # BLD AUTO: 0.87 K/UL — LOW (ref 1–3.3)
LYMPHOCYTES # BLD AUTO: 10.7 % — LOW (ref 13–44)
MCHC RBC-ENTMCNC: 30 PG — SIGNIFICANT CHANGE UP (ref 27–34)
MCHC RBC-ENTMCNC: 33 GM/DL — SIGNIFICANT CHANGE UP (ref 32–36)
MCV RBC AUTO: 90.8 FL — SIGNIFICANT CHANGE UP (ref 80–100)
MONOCYTES # BLD AUTO: 0.67 K/UL — SIGNIFICANT CHANGE UP (ref 0–0.9)
MONOCYTES NFR BLD AUTO: 8.3 % — SIGNIFICANT CHANGE UP (ref 2–14)
NEUTROPHILS # BLD AUTO: 5.32 K/UL — SIGNIFICANT CHANGE UP (ref 1.8–7.4)
NEUTROPHILS NFR BLD AUTO: 65.4 % — SIGNIFICANT CHANGE UP (ref 43–77)
PLATELET # BLD AUTO: 138 K/UL — LOW (ref 150–400)
POTASSIUM SERPL-MCNC: 4.1 MMOL/L — SIGNIFICANT CHANGE UP (ref 3.5–5.3)
POTASSIUM SERPL-SCNC: 4.1 MMOL/L — SIGNIFICANT CHANGE UP (ref 3.5–5.3)
PROT SERPL-MCNC: 6.8 G/DL — SIGNIFICANT CHANGE UP (ref 6–8.3)
RBC # BLD: 3.37 M/UL — LOW (ref 4.2–5.8)
RBC # FLD: 14.6 % — HIGH (ref 10.3–14.5)
SODIUM SERPL-SCNC: 140 MMOL/L — SIGNIFICANT CHANGE UP (ref 135–145)
WBC # BLD: 8.12 K/UL — SIGNIFICANT CHANGE UP (ref 3.8–10.5)
WBC # FLD AUTO: 8.12 K/UL — SIGNIFICANT CHANGE UP (ref 3.8–10.5)

## 2019-07-22 RX ORDER — SODIUM CHLORIDE 9 MG/ML
1000 INJECTION INTRAMUSCULAR; INTRAVENOUS; SUBCUTANEOUS
Refills: 0 | Status: DISCONTINUED | OUTPATIENT
Start: 2019-07-22 | End: 2019-07-24

## 2019-07-22 RX ORDER — MIDODRINE HYDROCHLORIDE 2.5 MG/1
2.5 TABLET ORAL THREE TIMES A DAY
Refills: 0 | Status: DISCONTINUED | OUTPATIENT
Start: 2019-07-22 | End: 2019-07-24

## 2019-07-22 RX ADMIN — PRAMIPEXOLE DIHYDROCHLORIDE 0.12 MILLIGRAM(S): 0.12 TABLET ORAL at 11:48

## 2019-07-22 RX ADMIN — SENNA PLUS 2 TABLET(S): 8.6 TABLET ORAL at 21:43

## 2019-07-22 RX ADMIN — Medication 1: at 12:59

## 2019-07-22 RX ADMIN — Medication 3 UNIT(S): at 13:00

## 2019-07-22 RX ADMIN — CARBIDOPA AND LEVODOPA 2 TABLET(S): 25; 100 TABLET ORAL at 06:05

## 2019-07-22 RX ADMIN — PRAMIPEXOLE DIHYDROCHLORIDE 0.12 MILLIGRAM(S): 0.12 TABLET ORAL at 17:11

## 2019-07-22 RX ADMIN — MIDODRINE HYDROCHLORIDE 2.5 MILLIGRAM(S): 2.5 TABLET ORAL at 21:43

## 2019-07-22 RX ADMIN — PREGABALIN 1000 MICROGRAM(S): 225 CAPSULE ORAL at 11:48

## 2019-07-22 RX ADMIN — ATORVASTATIN CALCIUM 80 MILLIGRAM(S): 80 TABLET, FILM COATED ORAL at 21:43

## 2019-07-22 RX ADMIN — PRAMIPEXOLE DIHYDROCHLORIDE 0.12 MILLIGRAM(S): 0.12 TABLET ORAL at 06:05

## 2019-07-22 RX ADMIN — Medication 3 UNIT(S): at 08:06

## 2019-07-22 RX ADMIN — INSULIN GLARGINE 10 UNIT(S): 100 INJECTION, SOLUTION SUBCUTANEOUS at 08:48

## 2019-07-22 RX ADMIN — Medication 1: at 08:06

## 2019-07-22 RX ADMIN — MIDODRINE HYDROCHLORIDE 2.5 MILLIGRAM(S): 2.5 TABLET ORAL at 15:16

## 2019-07-22 RX ADMIN — Medication 100 MILLIGRAM(S): at 06:06

## 2019-07-22 RX ADMIN — Medication 300 MILLIGRAM(S): at 11:48

## 2019-07-22 RX ADMIN — Medication 3 UNIT(S): at 17:13

## 2019-07-22 RX ADMIN — Medication 100 MILLIGRAM(S): at 21:43

## 2019-07-22 RX ADMIN — ENOXAPARIN SODIUM 40 MILLIGRAM(S): 100 INJECTION SUBCUTANEOUS at 11:47

## 2019-07-22 RX ADMIN — Medication 100 MILLIGRAM(S): at 13:13

## 2019-07-22 RX ADMIN — CARBIDOPA AND LEVODOPA 2 TABLET(S): 25; 100 TABLET ORAL at 11:47

## 2019-07-22 RX ADMIN — CLOPIDOGREL BISULFATE 75 MILLIGRAM(S): 75 TABLET, FILM COATED ORAL at 11:48

## 2019-07-22 RX ADMIN — CARBIDOPA AND LEVODOPA 2 TABLET(S): 25; 100 TABLET ORAL at 17:11

## 2019-07-22 RX ADMIN — Medication 75 MICROGRAM(S): at 06:06

## 2019-07-22 RX ADMIN — POLYETHYLENE GLYCOL 3350 17 GRAM(S): 17 POWDER, FOR SOLUTION ORAL at 11:48

## 2019-07-22 NOTE — PROGRESS NOTE ADULT - PROBLEM SELECTOR PLAN 7
residual partial right side blindness    MRI brain  - Neurology consult as above.

## 2019-07-22 NOTE — PROGRESS NOTE ADULT - PROBLEM SELECTOR PLAN 5
Pt takes latus 28 units daily and hulamog 5/3/5 TID FS 80s in ED    lantus 10 units in am and humalog 3 units TID  insuline sliding scale   monitor FS

## 2019-07-22 NOTE — PHYSICAL THERAPY INITIAL EVALUATION ADULT - GAIT DISTANCE, PT EVAL
100 feet/Pt BP decreased from sit to stand (114/69, 72/49), ok for ambulation as per Dr. Christianson who was present 100 feet/Pt BP decreased from sit to stand (114/69, 72/49), however, asymptomatic.  ok for ambulation as per Dr. Christianson who was present

## 2019-07-22 NOTE — PROGRESS NOTE ADULT - PROBLEM SELECTOR PLAN 2
sp gentle hydration, hold home antihtn-atenolol  compression stockings  PT eval  monitor orthostatsis
sp gentle hydration, hold home antihtn-atenolol  compression stockings  PT eval  monitor orthostatsis
sp gentle hydration, hold home antihtn-atenolol  compression stockings  PT eval  monitor orthostatsis  start midodrine

## 2019-07-22 NOTE — PROGRESS NOTE ADULT - PROBLEM SELECTOR PLAN 8
Diet: carb consistent  DVT ppx: lovenox,

## 2019-07-22 NOTE — PHYSICAL THERAPY INITIAL EVALUATION ADULT - ADDITIONAL COMMENTS
Pt lives with his wife in ranch style private home with 3 steps to enter. Pt reports he did not use an assistive device until the most recent admission where he obtained a RW. Pt states he used the RW for only a few days. Pt was able to ambulate within the home independently short distances and required assistance for ambulation out of the home.

## 2019-07-22 NOTE — PHYSICAL THERAPY INITIAL EVALUATION ADULT - BALANCE TRAINING, PT EVAL
GOAL: Pt will improve dynamic standing balance to normal to assist with functional mobility in 2 weeks.

## 2019-07-22 NOTE — PHYSICAL THERAPY INITIAL EVALUATION ADULT - PRECAUTIONS/LIMITATIONS, REHAB EVAL
fall precautions/CT brain 7/21/19: No evidence of acute infarction or hemorrhage, compared with CT 7/4/19. Mild chronic microvascular ischemic disease, as described above. Chronic lacunar infarction in the right centrum semiovale vision precautions/fall precautions/R eye blindness, CT brain 7/21/19: No evidence of acute infarction or hemorrhage, compared with CT 7/4/19. Mild chronic microvascular ischemic disease, as described above. Chronic lacunar infarction in the right centrum semiovale

## 2019-07-22 NOTE — PROGRESS NOTE ADULT - ATTENDING COMMENTS
[facility]  [phone]
[facility]  [phone]
[facility]  [phone]

## 2019-07-22 NOTE — PHYSICAL THERAPY INITIAL EVALUATION ADULT - PERTINENT HX OF CURRENT PROBLEM, REHAB EVAL
Pt is a 73 y/o M with c/o dizziness and found to be orthostatic in outpatient medical visits. Pt reports feeling unsteady on his feet and he feels like he may be drifting to one side when he walks. Wife stated she has heard the pt drag his foot more than usual in recent weeks.  His symptoms are reported as intermittent. Pt with prior admission 2 weeks ago with c/o AMS, LE weakness, and decreased ambulation

## 2019-07-22 NOTE — PHYSICAL THERAPY INITIAL EVALUATION ADULT - ACTIVE RANGE OF MOTION EXAMINATION, REHAB EVAL
jordan. upper extremity Active ROM was WNL (within normal limits)/bilateral lower extremity Active ROM was WNL (within normal limits)

## 2019-07-23 LAB
ANION GAP SERPL CALC-SCNC: 11 MMOL/L — SIGNIFICANT CHANGE UP (ref 5–17)
BUN SERPL-MCNC: 40 MG/DL — HIGH (ref 7–23)
CALCIUM SERPL-MCNC: 9.5 MG/DL — SIGNIFICANT CHANGE UP (ref 8.4–10.5)
CHLORIDE SERPL-SCNC: 104 MMOL/L — SIGNIFICANT CHANGE UP (ref 96–108)
CO2 SERPL-SCNC: 22 MMOL/L — SIGNIFICANT CHANGE UP (ref 22–31)
CREAT SERPL-MCNC: 1.62 MG/DL — HIGH (ref 0.5–1.3)
GLUCOSE BLDC GLUCOMTR-MCNC: 155 MG/DL — HIGH (ref 70–99)
GLUCOSE BLDC GLUCOMTR-MCNC: 157 MG/DL — HIGH (ref 70–99)
GLUCOSE BLDC GLUCOMTR-MCNC: 166 MG/DL — HIGH (ref 70–99)
GLUCOSE BLDC GLUCOMTR-MCNC: 175 MG/DL — HIGH (ref 70–99)
GLUCOSE SERPL-MCNC: 140 MG/DL — HIGH (ref 70–99)
HCT VFR BLD CALC: 30.7 % — LOW (ref 39–50)
HGB BLD-MCNC: 10.2 G/DL — LOW (ref 13–17)
MCHC RBC-ENTMCNC: 29.8 PG — SIGNIFICANT CHANGE UP (ref 27–34)
MCHC RBC-ENTMCNC: 33.2 GM/DL — SIGNIFICANT CHANGE UP (ref 32–36)
MCV RBC AUTO: 89.8 FL — SIGNIFICANT CHANGE UP (ref 80–100)
PLATELET # BLD AUTO: 146 K/UL — LOW (ref 150–400)
POTASSIUM SERPL-MCNC: 4.2 MMOL/L — SIGNIFICANT CHANGE UP (ref 3.5–5.3)
POTASSIUM SERPL-SCNC: 4.2 MMOL/L — SIGNIFICANT CHANGE UP (ref 3.5–5.3)
RBC # BLD: 3.42 M/UL — LOW (ref 4.2–5.8)
RBC # FLD: 14.6 % — HIGH (ref 10.3–14.5)
SODIUM SERPL-SCNC: 137 MMOL/L — SIGNIFICANT CHANGE UP (ref 135–145)
WBC # BLD: 8.95 K/UL — SIGNIFICANT CHANGE UP (ref 3.8–10.5)
WBC # FLD AUTO: 8.95 K/UL — SIGNIFICANT CHANGE UP (ref 3.8–10.5)

## 2019-07-23 RX ADMIN — PRAMIPEXOLE DIHYDROCHLORIDE 0.12 MILLIGRAM(S): 0.12 TABLET ORAL at 06:20

## 2019-07-23 RX ADMIN — MIDODRINE HYDROCHLORIDE 2.5 MILLIGRAM(S): 2.5 TABLET ORAL at 21:49

## 2019-07-23 RX ADMIN — Medication 300 MILLIGRAM(S): at 12:11

## 2019-07-23 RX ADMIN — Medication 100 MILLIGRAM(S): at 06:20

## 2019-07-23 RX ADMIN — SENNA PLUS 2 TABLET(S): 8.6 TABLET ORAL at 21:49

## 2019-07-23 RX ADMIN — CARBIDOPA AND LEVODOPA 2 TABLET(S): 25; 100 TABLET ORAL at 06:20

## 2019-07-23 RX ADMIN — Medication 1: at 12:06

## 2019-07-23 RX ADMIN — Medication 75 MICROGRAM(S): at 06:20

## 2019-07-23 RX ADMIN — CARBIDOPA AND LEVODOPA 2 TABLET(S): 25; 100 TABLET ORAL at 17:43

## 2019-07-23 RX ADMIN — Medication 3 UNIT(S): at 17:20

## 2019-07-23 RX ADMIN — MIDODRINE HYDROCHLORIDE 2.5 MILLIGRAM(S): 2.5 TABLET ORAL at 14:25

## 2019-07-23 RX ADMIN — MIDODRINE HYDROCHLORIDE 2.5 MILLIGRAM(S): 2.5 TABLET ORAL at 06:21

## 2019-07-23 RX ADMIN — Medication 100 MILLIGRAM(S): at 14:25

## 2019-07-23 RX ADMIN — ENOXAPARIN SODIUM 40 MILLIGRAM(S): 100 INJECTION SUBCUTANEOUS at 12:11

## 2019-07-23 RX ADMIN — CLOPIDOGREL BISULFATE 75 MILLIGRAM(S): 75 TABLET, FILM COATED ORAL at 12:11

## 2019-07-23 RX ADMIN — CARBIDOPA AND LEVODOPA 2 TABLET(S): 25; 100 TABLET ORAL at 00:44

## 2019-07-23 RX ADMIN — PREGABALIN 1000 MICROGRAM(S): 225 CAPSULE ORAL at 12:11

## 2019-07-23 RX ADMIN — Medication 3 UNIT(S): at 12:06

## 2019-07-23 RX ADMIN — ATORVASTATIN CALCIUM 80 MILLIGRAM(S): 80 TABLET, FILM COATED ORAL at 21:49

## 2019-07-23 RX ADMIN — PRAMIPEXOLE DIHYDROCHLORIDE 0.12 MILLIGRAM(S): 0.12 TABLET ORAL at 17:44

## 2019-07-23 RX ADMIN — Medication 1: at 17:20

## 2019-07-23 RX ADMIN — PRAMIPEXOLE DIHYDROCHLORIDE 0.12 MILLIGRAM(S): 0.12 TABLET ORAL at 12:11

## 2019-07-23 RX ADMIN — Medication 3 UNIT(S): at 08:48

## 2019-07-23 RX ADMIN — Medication 1: at 08:48

## 2019-07-23 RX ADMIN — POLYETHYLENE GLYCOL 3350 17 GRAM(S): 17 POWDER, FOR SOLUTION ORAL at 14:26

## 2019-07-23 RX ADMIN — PRAMIPEXOLE DIHYDROCHLORIDE 0.12 MILLIGRAM(S): 0.12 TABLET ORAL at 00:44

## 2019-07-23 RX ADMIN — INSULIN GLARGINE 10 UNIT(S): 100 INJECTION, SOLUTION SUBCUTANEOUS at 08:49

## 2019-07-23 RX ADMIN — Medication 100 MILLIGRAM(S): at 21:49

## 2019-07-23 RX ADMIN — CARBIDOPA AND LEVODOPA 2 TABLET(S): 25; 100 TABLET ORAL at 12:11

## 2019-07-23 NOTE — CONSULT NOTE ADULT - PROBLEM SELECTOR RECOMMENDATION 9
-diabetic autonomic polyneuropathy vs (less likely) degenerative disorder of CNS with orthostasis (multisistem atrophy/PD)  -Midodrine 2.5 mg 3 times a day  -Ok with current regimen as long as he remains asymptomatic when ambulating and standing up.  -ok with holding anti hypertensives for now  -Given significant supine hypertension ok with supine -170/90s  -MRI of brain unremarkable  -neuro recs appreciated.  -Discharge planning in 24 hours as long as patient remains asymptomatic on current regimen.     Patient of dr steven goldberg ([facility]    SARAH Duke.ANSON.  [phone]

## 2019-07-23 NOTE — CONSULT NOTE ADULT - ASSESSMENT
75 yo male with longstanding gait problems, likely multifactorial including - parkinson's disease, diabetic polyneuropathy, visual field cut.    Now - new onset orthostasis.    - diabetic autonomic polyneuropathy vs (less likely) degenerative disorder of CNS with orthostasis (multisistem atrophy)  - please discontinue antihypertensive ( accomplished)  - increase fluid intake  - increase salt intake  - avoid supine position - worsens orthostasis  - compressive stockings to the THIGHS (not to the knees)  - can dc home to FU with primary neurologist Dr Zheng  - low expectation from MRI    - discussed extensively with the wife, son, nursing staff.
75yo male with pmhx of IDDM, orthostatic HTN, Prostate CA currently receiving radiation Tx, CVA (partial R eye blindness), CAD s/p stents, Parkinsos and nephrolithiasis p/w dizziness.

## 2019-07-23 NOTE — CONSULT NOTE ADULT - SUBJECTIVE AND OBJECTIVE BOX
[facility] Cardiology Consult  _________________________    Patient is a 74y old  Male who presents with a chief complaint of Dizziness /Vertigo (22 Jul 2019 14:51)      HPI:  75yo male with pmhx of IDDM, orthostatic HTN, Prostate CA currently receiving radiation Tx, CVA (partial R eye blindness), CAD s/p stents, Parkinsos and nephrolithiasis p/w dizziness. Patient was seen for a similar presentation 2 weeks ago in the ER. He reports dizziness unclear if "room spinning" vs lightheadedness worse, states its a combination of both. Per wife he was found to be orthostatic in outpatient medical visits, blood pressures between 80 - 170 systolic. Subcequently PCP  decreased atenolol to QOD and d/c'd amlodipine in light of recent hypotensive events. Patient reports feeling unsteady on his feet and may be drifting to one side when he walks. Wife states she can hear the pt drag his footsteps more than usual since the last few weeks. He follows up with a neurologist for parkinson's and is currently on meds. Per wife Neurologist is not aware of dizziness/ unstable gait.  His symptoms are intermittent and sometimes he is asymptomatic and other times, he can barely walk a straight line. Today patient rolled off of bed onto floor and family thinks symptoms are worsening. He denies any LOC, HA, CP, palpitations, vision changes other than residual partial right sided blindness.      CT head from 2 wks ago w/o any acute changes.   found to be orthostatic positive  on midodrine  mri of brain unremarkable.     PAST MEDICAL & SURGICAL HISTORY:  SBO (small bowel obstruction): sept 2015  Merkel cell carcinoma of face  Kidney stone  CRI (chronic renal insufficiency)  CAD (coronary artery disease)  Diabetes  CVA (cerebral infarction)  Hyperlipidemia  Hypertension  S/P tonsillectomy and adenoidectomy: at age 31  S/P cataract surgery  Status post placement of implantable loop recorder: May 2015  Kidney stones: 9/16/19  S/P appendectomy: age 16  S/P coronary artery stent placement: 12/21/10      MEDICATIONS  (STANDING):  allopurinol 300 milliGRAM(s) Oral daily  atorvastatin 80 milliGRAM(s) Oral at bedtime  carbidopa/levodopa  25/100 2 Tablet(s) Oral every 6 hours  clopidogrel Tablet 75 milliGRAM(s) Oral daily  cyanocobalamin 1000 MICROGram(s) Oral daily  dextrose 5%. 1000 milliLiter(s) (50 mL/Hr) IV Continuous <Continuous>  dextrose 50% Injectable 12.5 Gram(s) IV Push once  dextrose 50% Injectable 25 Gram(s) IV Push once  dextrose 50% Injectable 25 Gram(s) IV Push once  docusate sodium 100 milliGRAM(s) Oral three times a day  enoxaparin Injectable 40 milliGRAM(s) SubCutaneous daily  insulin glargine Injectable (LANTUS) 10 Unit(s) SubCutaneous every morning  insulin lispro (HumaLOG) corrective regimen sliding scale   SubCutaneous three times a day before meals  insulin lispro Injectable (HumaLOG) 3 Unit(s) SubCutaneous three times a day before meals  levothyroxine 75 MICROGram(s) Oral daily  midodrine 2.5 milliGRAM(s) Oral three times a day  polyethylene glycol 3350 17 Gram(s) Oral daily  pramipexole 0.125 milliGRAM(s) Oral every 6 hours  senna 2 Tablet(s) Oral at bedtime  sodium chloride 0.9%. 1000 milliLiter(s) (75 mL/Hr) IV Continuous <Continuous>    MEDICATIONS  (PRN):  dextrose 40% Gel 15 Gram(s) Oral once PRN Blood Glucose LESS THAN 70 milliGRAM(s)/deciliter  glucagon  Injectable 1 milliGRAM(s) IntraMuscular once PRN Glucose LESS THAN 70 milligrams/deciliter      Allergies    No Known Allergies    Intolerances        Social Histroy: Tobacco- , ETOH-, Illicit Drugs-    T(C): 36.5 (07-23-19 @ 04:00), Max: 36.5 (07-22-19 @ 20:40)  HR: 84 (07-23-19 @ 04:00) (70 - 85)  BP: 174/87 (07-23-19 @ 04:00) (109/59 - 174/87)  RR: 18 (07-23-19 @ 04:00) (18 - 18)  SpO2: 96% (07-23-19 @ 04:00) (93% - 100%)  I&O's Summary    22 Jul 2019 07:01  -  23 Jul 2019 07:00  --------------------------------------------------------  IN: 1275 mL / OUT: 850 mL / NET: 425 mL        Review of Systems:  Constitutional: [ ] Fever [ ] Chills [ ] Fatigue [ ] Weight change   HEENT: [ ] Blurred vision [ ] Eye Pain [ ] Headache [ ] Runny nose [ ] Sore Throat   Respiratory: [ ] Cough [ ] Wheezing [ ] Shortness of breath  Cardiovascular: [ ] Chest Pain [ ] Palpitations [ ] PAULINO [ ] PND [ ] Orthopnea  Gastrointestinal: [ ] Abdominal Pain [ ] Diarrhea [ ] Constipation [ ] Hemorrhoids [ ] Nausea [ ] Vomiting  Genitourinary: [ ] Nocturia [ ] Dysuria [ ] Incontinence  Extremities: [ ] Swelling [ ] Joint Pain  Neurologic: [ ] Focal deficit [ ] Paresthesias [ ] Syncope  Lymphatic: [ ] Swelling [ ] Lymphadenopathy   Skin: [ ] Rash [ ] Ecchymoses [ ] Wounds [ ] Lesions  Psychiatry: [ ] Depression [ ] Suicidal/Homicidal Ideation [ ] Anxiety [ ] Sleep Disturbances  [x ] 10 point review of systems is otherwise negative except as mentioned above            [ ]Unable to obtain    PHYSICAL EXAM:  GENERAL: Alert, NAD  NECK: Supple, No JVD, No carotid bruit.  CHEST/LUNG: Clear to auscultation bilaterally; No wheezes, rales, or rhonchi  HEART: S1 S2 normal, RRR,  No murmurs, rubs, or gallops  ABDOMEN: Soft, Nontender, Nondistended; Bowel sounds present  EXTREMITIES:  No LE edema.      LABS:                        10.1   8.12  )-----------( 138      ( 22 Jul 2019 08:41 )             30.6     07-23    137  |  104  |  40<H>  ----------------------------<  140<H>  4.2   |  22  |  1.62<H>    Ca    9.5      23 Jul 2019 06:01    TPro  6.8  /  Alb  3.9  /  TBili  0.6  /  DBili  x   /  AST  10  /  ALT  <5<L>  /  AlkPhos  126<H>  07-22                  MEDICATIONS  (STANDING):  allopurinol 300 milliGRAM(s) Oral daily  atorvastatin 80 milliGRAM(s) Oral at bedtime  carbidopa/levodopa  25/100 2 Tablet(s) Oral every 6 hours  clopidogrel Tablet 75 milliGRAM(s) Oral daily  cyanocobalamin 1000 MICROGram(s) Oral daily  dextrose 5%. 1000 milliLiter(s) (50 mL/Hr) IV Continuous <Continuous>  dextrose 50% Injectable 12.5 Gram(s) IV Push once  dextrose 50% Injectable 25 Gram(s) IV Push once  dextrose 50% Injectable 25 Gram(s) IV Push once  docusate sodium 100 milliGRAM(s) Oral three times a day  enoxaparin Injectable 40 milliGRAM(s) SubCutaneous daily  insulin glargine Injectable (LANTUS) 10 Unit(s) SubCutaneous every morning  insulin lispro (HumaLOG) corrective regimen sliding scale   SubCutaneous three times a day before meals  insulin lispro Injectable (HumaLOG) 3 Unit(s) SubCutaneous three times a day before meals  levothyroxine 75 MICROGram(s) Oral daily  midodrine 2.5 milliGRAM(s) Oral three times a day  polyethylene glycol 3350 17 Gram(s) Oral daily  pramipexole 0.125 milliGRAM(s) Oral every 6 hours  senna 2 Tablet(s) Oral at bedtime  sodium chloride 0.9%. 1000 milliLiter(s) (75 mL/Hr) IV Continuous <Continuous>    MEDICATIONS  (PRN):  dextrose 40% Gel 15 Gram(s) Oral once PRN Blood Glucose LESS THAN 70 milliGRAM(s)/deciliter  glucagon  Injectable 1 milliGRAM(s) IntraMuscular once PRN Glucose LESS THAN 70 milligrams/deciliter          RADIOLOGY & ADDITIONAL TESTS:    Cardiology testing:  EKG: sinus 66 bpm    Telemetry: sinus 70-90s
Admitting Diagnosis:  Dizziness and giddiness (R42): DIZZINESS AND GIDDINESS      HPI:  This is a 74y year old Male with the below past medical history of IDDM, orthostatic HTN, Prostate CA currently receiving radiation Tx, stroke ( R visual field cut), CAD s/p stents, Parkinson's disease and nephrolithiasis p/w dizziness. Patient was seen for a similar presentation 2 weeks ago in the ER. He reports lightheadedness with attempt to get up. Per wife he was found to be orthostatic in outpatient medical visits, blood pressures between 80 - 170 systolic. Subcequently PCP  decreased atenolol to QOD and d/c'd amlodipine in light of recent hypotensive events. Patient reports feeling unsteady on his feet and may be drifting to one side when he walks.  His symptoms are intermittent and sometimes he is asymptomatic and other times, he can barely walk a straight line. Today patient rolled off of bed onto floor and family thinks symptoms are worsening. He denies any LOC, HA, weakness, numbness vision changes other than residual partial right sided blindness.      Past Medical History:  SBO (small bowel obstruction) (K56.69): 2015  Merkel cell carcinoma of face (209.31)  Kidney stone (592.0)  CRI (chronic renal insufficiency) (585.9)  CAD (coronary artery disease) (414.00)  Diabetes (250.00)  CVA (cerebral infarction) (434.91)  Hyperlipidemia (272.4)  Hypertension (401.9)      Past Surgical History:  S/P tonsillectomy and adenoidectomy (V45.89): at age 31  S/P cataract surgery (V45.61)  Status post placement of implantable loop recorder (V45.09): May 2015  Kidney stones (592.0): 20  S/P appendectomy (V45.89): age 16  S/P coronary artery stent placement (V45.82): 12/21/10  No significant past surgical history ([id number])      Social History:  No toxic habits    Family History:  FAMILY HISTORY:  No pertinent family history in first degree relatives      Allergies:  No Known Allergies      ROS:  Constitutional: Patient offers no complaints of fevers or significant weight loss  Ears, Nose, Mouth and Throat: The patient presents with no abnormalities of the head, ears, eyes, nose or throat  Skin: Patient offers no concerns of new rashes or lesions  Respiratory: The patient presents with no abnormalities of the respiratory tract  Cardiovascular: The patient presents with no cardiac abnormalities  Gastrointestinal: The patient presents with no abnormalities of the GI system  Genitourinary: The patient presents with no dysuria, hematuria or frequent urination  Neurological: See HPI  Endocrine: Patient offers no complaints of excessive thirst, urination, or heat/cold intolerance    Advanced care planning reviewed and noted in the chart.    Medications:  allopurinol 300 milliGRAM(s) Oral daily  atorvastatin 80 milliGRAM(s) Oral at bedtime  carbidopa/levodopa  25/100 2 Tablet(s) Oral every 6 hours  clopidogrel Tablet 75 milliGRAM(s) Oral daily  cyanocobalamin 1000 MICROGram(s) Oral daily  dextrose 40% Gel 15 Gram(s) Oral once PRN  dextrose 5%. 1000 milliLiter(s) IV Continuous <Continuous>  dextrose 50% Injectable 12.5 Gram(s) IV Push once  dextrose 50% Injectable 25 Gram(s) IV Push once  dextrose 50% Injectable 25 Gram(s) IV Push once  docusate sodium 100 milliGRAM(s) Oral three times a day  enoxaparin Injectable 40 milliGRAM(s) SubCutaneous daily  glucagon  Injectable 1 milliGRAM(s) IntraMuscular once PRN  insulin glargine Injectable (LANTUS) 10 Unit(s) SubCutaneous every morning  insulin lispro (HumaLOG) corrective regimen sliding scale   SubCutaneous three times a day before meals  insulin lispro Injectable (HumaLOG) 3 Unit(s) SubCutaneous three times a day before meals  levothyroxine 75 MICROGram(s) Oral daily  polyethylene glycol 3350 17 Gram(s) Oral daily  pramipexole 0.125 milliGRAM(s) Oral every 6 hours  senna 2 Tablet(s) Oral at bedtime      Labs:  CBC Full  -  ( 2019 14:36 )  WBC Count : 10.7 K/uL  RBC Count : 3.55 M/uL  Hemoglobin : 11.1 g/dL  Hematocrit : 31.8 %  Platelet Count - Automated : 143 K/uL  Mean Cell Volume : 89.6 fl  Mean Cell Hemoglobin : 31.2 pg  Mean Cell Hemoglobin Concentration : 34.8 gm/dL  Auto Neutrophil # : 7.4 K/uL  Auto Lymphocyte # : 1.2 K/uL  Auto Monocyte # : 0.8 K/uL  Auto Eosinophil # : 1.3 K/uL  Auto Basophil # : 0.1 K/uL  Auto Neutrophil % : 68.6 %  Auto Lymphocyte % : 11.3 %  Auto Monocyte % : 7.7 %  Auto Eosinophil % : 11.9 %  Auto Basophil % : 0.5 %        134<L>  |  97  |  43<H>  ----------------------------<  89  3.9   |  23  |  1.70<H>    Ca    9.6      2019 14:36    TPro  7.4  /  Alb  4.5  /  TBili  0.6  /  DBili  x   /  AST  11  /  ALT  <5<L>  /  AlkPhos  146<H>      CAPILLARY BLOOD GLUCOSE      POCT Blood Glucose.: 191 mg/dL (2019 07:55)  POCT Blood Glucose.: 173 mg/dL (2019 21:41)  POCT Blood Glucose.: 209 mg/dL (2019 16:37)  POCT Blood Glucose.: 179 mg/dL (2019 11:50)    LIVER FUNCTIONS - ( 2019 14:36 )  Alb: 4.5 g/dL / Pro: 7.4 g/dL / ALK PHOS: 146 U/L / ALT: <5 U/L / AST: 11 U/L / GGT: x             Urinalysis Basic - ( 2019 15:53 )    Color: Light Yellow / Appearance: Clear / S.010 / pH: x  Gluc: x / Ketone: Negative  / Bili: Negative / Urobili: Negative   Blood: x / Protein: Trace / Nitrite: Negative   Leuk Esterase: Negative / RBC: 1 /hpf / WBC 1 /HPF   Sq Epi: x / Non Sq Epi: 0 /hpf / Bacteria: Negative        NEUROLOGICAL EXAM:    Mental status: Awake, alert, and in no apparent distress. Oriented to person, place and time. Language function is normal. Recent memory, digit span and concentration were normal.     Cranial Nerves: Pupils were equal, round, reactive to light. Extraocular movements were intact. Visual field were restricted on the right. Fundoscopic exam was deferred. Facial sensation was intact to light touch. There was no facial asymmetry. The palate was upgoing symmetrically and tongue was midline. Hearing acuity was intact to finger rub AU. Shoulder shrug was full bilaterally    Motor exam: Bulk and tone were normal. Strength was 5/5 in all four extremities. Fine finger movements were symmetric and normal. There was no pronator drift    Reflexes: 2+ in the bilateral upper extremities. 2+ in the bilateral lower extremities. Toes were downgoing bilaterally.     Sensation: Intact to light touch, temperature, vibration and proprioception.     Coordination: Finger-nose-finger and heel-to-shin was without dysmetria.     Gait: gets up with nod difficulties Deferred gait eval.     Medications:  allopurinol 300 milliGRAM(s) Oral daily  atorvastatin 80 milliGRAM(s) Oral at bedtime  carbidopa/levodopa  25/100 2 Tablet(s) Oral every 6 hours  clopidogrel Tablet 75 milliGRAM(s) Oral daily  cyanocobalamin 1000 MICROGram(s) Oral daily  dextrose 40% Gel 15 Gram(s) Oral once PRN  dextrose 5%. 1000 milliLiter(s) IV Continuous <Continuous>  dextrose 50% Injectable 12.5 Gram(s) IV Push once  dextrose 50% Injectable 25 Gram(s) IV Push once  dextrose 50% Injectable 25 Gram(s) IV Push once  docusate sodium 100 milliGRAM(s) Oral three times a day  enoxaparin Injectable 40 milliGRAM(s) SubCutaneous daily  glucagon  Injectable 1 milliGRAM(s) IntraMuscular once PRN  insulin glargine Injectable (LANTUS) 10 Unit(s) SubCutaneous every morning  insulin lispro (HumaLOG) corrective regimen sliding scale   SubCutaneous three times a day before meals  insulin lispro Injectable (HumaLOG) 3 Unit(s) SubCutaneous three times a day before meals  levothyroxine 75 MICROGram(s) Oral daily  polyethylene glycol 3350 17 Gram(s) Oral daily  pramipexole 0.125 milliGRAM(s) Oral every 6 hours  senna 2 Tablet(s) Oral at bedtime      Labs:  CBC Full  -  ( 2019 14:36 )  WBC Count : 10.7 K/uL  RBC Count : 3.55 M/uL  Hemoglobin : 11.1 g/dL  Hematocrit : 31.8 %  Platelet Count - Automated : 143 K/uL  Mean Cell Volume : 89.6 fl  Mean Cell Hemoglobin : 31.2 pg  Mean Cell Hemoglobin Concentration : 34.8 gm/dL  Auto Neutrophil # : 7.4 K/uL  Auto Lymphocyte # : 1.2 K/uL  Auto Monocyte # : 0.8 K/uL  Auto Eosinophil # : 1.3 K/uL  Auto Basophil # : 0.1 K/uL  Auto Neutrophil % : 68.6 %  Auto Lymphocyte % : 11.3 %  Auto Monocyte % : 7.7 %  Auto Eosinophil % : 11.9 %  Auto Basophil % : 0.5 %        134<L>  |  97  |  43<H>  ----------------------------<  89  3.9   |  23  |  1.70<H>    Ca    9.6      2019 14:36    TPro  7.4  /  Alb  4.5  /  TBili  0.6  /  DBili  x   /  AST  11  /  ALT  <5<L>  /  AlkPhos  146<H>      CAPILLARY BLOOD GLUCOSE      POCT Blood Glucose.: 191 mg/dL (2019 07:55)  POCT Blood Glucose.: 173 mg/dL (2019 21:41)  POCT Blood Glucose.: 209 mg/dL (2019 16:37)  POCT Blood Glucose.: 179 mg/dL (2019 11:50)    LIVER FUNCTIONS - ( 2019 14:36 )  Alb: 4.5 g/dL / Pro: 7.4 g/dL / ALK PHOS: 146 U/L / ALT: <5 U/L / AST: 11 U/L / GGT: x             Urinalysis Basic - ( 2019 15:53 )    Color: Light Yellow / Appearance: Clear / S.010 / pH: x  Gluc: x / Ketone: Negative  / Bili: Negative / Urobili: Negative   Blood: x / Protein: Trace / Nitrite: Negative   Leuk Esterase: Negative / RBC: 1 /hpf / WBC 1 /HPF   Sq Epi: x / Non Sq Epi: 0 /hpf / Bacteria: Negative          Vitals:  Vital Signs Last 24 Hrs  T(C): 36.3 (2019 04:25), Max: 36.6 (2019 11:39)  T(F): 97.3 (2019 04:25), Max: 97.8 (2019 11:39)  HR: 69 (2019 04:25) (69 - 73)  BP: 147/78 (2019 04:25) (131/77 - 151/82)  BP(mean): --  RR: 18 (2019 04:25) (18 - 18)  SpO2: 100% (2019 04:25) (98% - 100%)

## 2019-07-24 ENCOUNTER — TRANSCRIPTION ENCOUNTER (OUTPATIENT)
Age: 74
End: 2019-07-24

## 2019-07-24 VITALS — WEIGHT: 158.07 LBS

## 2019-07-24 LAB
ANION GAP SERPL CALC-SCNC: 16 MMOL/L — SIGNIFICANT CHANGE UP (ref 5–17)
APPEARANCE UR: CLEAR — SIGNIFICANT CHANGE UP
BILIRUB UR-MCNC: NEGATIVE — SIGNIFICANT CHANGE UP
BUN SERPL-MCNC: 39 MG/DL — HIGH (ref 7–23)
CALCIUM SERPL-MCNC: 9.3 MG/DL — SIGNIFICANT CHANGE UP (ref 8.4–10.5)
CHLORIDE SERPL-SCNC: 104 MMOL/L — SIGNIFICANT CHANGE UP (ref 96–108)
CO2 SERPL-SCNC: 18 MMOL/L — LOW (ref 22–31)
COLOR SPEC: SIGNIFICANT CHANGE UP
CREAT SERPL-MCNC: 1.45 MG/DL — HIGH (ref 0.5–1.3)
DIFF PNL FLD: NEGATIVE — SIGNIFICANT CHANGE UP
GLUCOSE BLDC GLUCOMTR-MCNC: 172 MG/DL — HIGH (ref 70–99)
GLUCOSE SERPL-MCNC: 141 MG/DL — HIGH (ref 70–99)
GLUCOSE UR QL: ABNORMAL
HCT VFR BLD CALC: 31.7 % — LOW (ref 39–50)
HGB BLD-MCNC: 9.6 G/DL — LOW (ref 13–17)
KETONES UR-MCNC: NEGATIVE — SIGNIFICANT CHANGE UP
LEUKOCYTE ESTERASE UR-ACNC: NEGATIVE — SIGNIFICANT CHANGE UP
MCHC RBC-ENTMCNC: 29.6 PG — SIGNIFICANT CHANGE UP (ref 27–34)
MCHC RBC-ENTMCNC: 30.3 GM/DL — LOW (ref 32–36)
MCV RBC AUTO: 97.8 FL — SIGNIFICANT CHANGE UP (ref 80–100)
NITRITE UR-MCNC: NEGATIVE — SIGNIFICANT CHANGE UP
PH UR: 6 — SIGNIFICANT CHANGE UP (ref 5–8)
PLATELET # BLD AUTO: 102 K/UL — LOW (ref 150–400)
POTASSIUM SERPL-MCNC: 4.3 MMOL/L — SIGNIFICANT CHANGE UP (ref 3.5–5.3)
POTASSIUM SERPL-SCNC: 4.3 MMOL/L — SIGNIFICANT CHANGE UP (ref 3.5–5.3)
PROT UR-MCNC: ABNORMAL
RBC # BLD: 3.24 M/UL — LOW (ref 4.2–5.8)
RBC # FLD: 14.7 % — HIGH (ref 10.3–14.5)
SODIUM SERPL-SCNC: 138 MMOL/L — SIGNIFICANT CHANGE UP (ref 135–145)
SP GR SPEC: 1.02 — SIGNIFICANT CHANGE UP (ref 1.01–1.02)
UROBILINOGEN FLD QL: NEGATIVE — SIGNIFICANT CHANGE UP
WBC # BLD: 7.41 K/UL — SIGNIFICANT CHANGE UP (ref 3.8–10.5)
WBC # FLD AUTO: 7.41 K/UL — SIGNIFICANT CHANGE UP (ref 3.8–10.5)

## 2019-07-24 PROCEDURE — 99285 EMERGENCY DEPT VISIT HI MDM: CPT

## 2019-07-24 PROCEDURE — 97161 PT EVAL LOW COMPLEX 20 MIN: CPT

## 2019-07-24 PROCEDURE — 83036 HEMOGLOBIN GLYCOSYLATED A1C: CPT

## 2019-07-24 PROCEDURE — 86803 HEPATITIS C AB TEST: CPT

## 2019-07-24 PROCEDURE — 84484 ASSAY OF TROPONIN QUANT: CPT

## 2019-07-24 PROCEDURE — 82962 GLUCOSE BLOOD TEST: CPT

## 2019-07-24 PROCEDURE — 81001 URINALYSIS AUTO W/SCOPE: CPT

## 2019-07-24 PROCEDURE — 93005 ELECTROCARDIOGRAM TRACING: CPT

## 2019-07-24 PROCEDURE — 70551 MRI BRAIN STEM W/O DYE: CPT

## 2019-07-24 PROCEDURE — 82272 OCCULT BLD FECES 1-3 TESTS: CPT

## 2019-07-24 PROCEDURE — 85027 COMPLETE CBC AUTOMATED: CPT

## 2019-07-24 PROCEDURE — 80048 BASIC METABOLIC PNL TOTAL CA: CPT

## 2019-07-24 PROCEDURE — 80053 COMPREHEN METABOLIC PANEL: CPT

## 2019-07-24 RX ORDER — MIDODRINE HYDROCHLORIDE 2.5 MG/1
1 TABLET ORAL
Qty: 90 | Refills: 0
Start: 2019-07-24 | End: 2019-08-22

## 2019-07-24 RX ORDER — PRAMIPEXOLE DIHYDROCHLORIDE 0.12 MG/1
1 TABLET ORAL
Qty: 0 | Refills: 0 | DISCHARGE
Start: 2019-07-24

## 2019-07-24 RX ORDER — ALLOPURINOL 300 MG
1 TABLET ORAL
Qty: 0 | Refills: 0 | DISCHARGE
Start: 2019-07-24

## 2019-07-24 RX ORDER — CARBIDOPA AND LEVODOPA 25; 100 MG/1; MG/1
2 TABLET ORAL
Qty: 0 | Refills: 0 | DISCHARGE
Start: 2019-07-24

## 2019-07-24 RX ADMIN — Medication 1: at 08:23

## 2019-07-24 RX ADMIN — Medication 300 MILLIGRAM(S): at 11:14

## 2019-07-24 RX ADMIN — PRAMIPEXOLE DIHYDROCHLORIDE 0.12 MILLIGRAM(S): 0.12 TABLET ORAL at 11:14

## 2019-07-24 RX ADMIN — INSULIN GLARGINE 10 UNIT(S): 100 INJECTION, SOLUTION SUBCUTANEOUS at 08:23

## 2019-07-24 RX ADMIN — Medication 75 MICROGRAM(S): at 06:26

## 2019-07-24 RX ADMIN — CARBIDOPA AND LEVODOPA 2 TABLET(S): 25; 100 TABLET ORAL at 00:10

## 2019-07-24 RX ADMIN — PRAMIPEXOLE DIHYDROCHLORIDE 0.12 MILLIGRAM(S): 0.12 TABLET ORAL at 06:26

## 2019-07-24 RX ADMIN — ENOXAPARIN SODIUM 40 MILLIGRAM(S): 100 INJECTION SUBCUTANEOUS at 11:15

## 2019-07-24 RX ADMIN — Medication 3 UNIT(S): at 08:23

## 2019-07-24 RX ADMIN — PRAMIPEXOLE DIHYDROCHLORIDE 0.12 MILLIGRAM(S): 0.12 TABLET ORAL at 00:10

## 2019-07-24 RX ADMIN — CLOPIDOGREL BISULFATE 75 MILLIGRAM(S): 75 TABLET, FILM COATED ORAL at 11:14

## 2019-07-24 RX ADMIN — CARBIDOPA AND LEVODOPA 2 TABLET(S): 25; 100 TABLET ORAL at 11:15

## 2019-07-24 RX ADMIN — PREGABALIN 1000 MICROGRAM(S): 225 CAPSULE ORAL at 11:14

## 2019-07-24 RX ADMIN — Medication 100 MILLIGRAM(S): at 06:26

## 2019-07-24 RX ADMIN — MIDODRINE HYDROCHLORIDE 2.5 MILLIGRAM(S): 2.5 TABLET ORAL at 06:26

## 2019-07-24 RX ADMIN — CARBIDOPA AND LEVODOPA 2 TABLET(S): 25; 100 TABLET ORAL at 06:26

## 2019-07-24 NOTE — DISCHARGE NOTE NURSING/CASE MANAGEMENT/SOCIAL WORK - NSDCPEPTSTRK_GEN_ALL_CORE
Signs and symptoms of stroke/Stroke education booklet/Risk factors for stroke/Stroke warning signs and symptoms/Prescribed medications/Need for follow up after discharge/Call 911 for stroke/Stroke support groups for patients, families, and friends

## 2019-07-24 NOTE — DISCHARGE NOTE PROVIDER - CARE PROVIDER_API CALL
Goldberg, Steven M (MD)  Cardiovascular Disease; Internal Medicine  [address] [place] [address]  Phone: [phone]  Fax: [phone]  Follow Up Time: 2 weeks    Barrett Zheng)  Internal Medicine; Neurology  [address] [facility], [place] [address]  Phone: [phone]  Fax: [phone]  Follow Up Time: 2 weeks

## 2019-07-24 NOTE — DISCHARGE NOTE PROVIDER - PROVIDER TOKENS
PROVIDER:[TOKEN:[[id number]],FOLLOWUP:[2 weeks]],PROVIDER:[TOKEN:[[id number]],FOLLOWUP:[2 weeks]]

## 2019-07-24 NOTE — DISCHARGE NOTE PROVIDER - NSDCCPCAREPLAN_GEN_ALL_CORE_FT
PRINCIPAL DISCHARGE DIAGNOSIS  Diagnosis: Orthostatic hypotension  Assessment and Plan of Treatment: Orthostatic hypotension is a sudden drop in blood pressure that happens when a person stands up. This drop in blood pressure can make you feel dizzy, lightheaded, blurry or dim vision, weakness, or pass out.  The symptoms all happen when you stand up after sitting or lying down. Causes of orthostatic hypotension are dehydration which means you do not have enough fluids due to reasons such as severe diarrhea or vomiting, decreased fluid intake, increased sweating due to exercise or extreme hot temperatures, or certain medications. Older people are more likely than younger people to have it. Seek medical help if you feel dizzy or like you might pass out when you stand up. The simplest test is to take your blood pressure and pulse while you are sitting or lying down and then again after you stand up. Continue taking Midodrine 2.5 three times a day.   Few things you can do to reduce the problems caused by orthostatic hypotension:  -Stand up slowly and give your body time to adapt. This is especially important when you get out of bed in the morning. Start by sitting up and waiting a moment. Then swing your legs over the side of the bed and wait some more. When you do stand up, make sure you have something to hold onto in case you start to feel dizzy.  -Avoid running, hiking, or doing anything that takes a lot of energy in hot weather. These things can make orthostatic hypotension worse.  -Make sure you drink enough fluids, especially in hot weather.  -Put blocks under the posts at the head of your bed. This will raise your head above your heart slightly.  -Wear "compression" stockings. The ones that go to your waist are most helpful, but they are hard to wear.  -Avoid drinking much alcohol.

## 2019-07-24 NOTE — DISCHARGE NOTE PROVIDER - NSDCCAREPROVSEEN_GEN_ALL_CORE_FT
[facility] Medicine, Advance PracticeTeam  Gulipelli, Sailaja Goldberg, Steven M

## 2019-07-24 NOTE — PROGRESS NOTE ADULT - ASSESSMENT
73 yo male with longstanding gait difficulty, likely multifactorial including - parkinson's disease, diabetic polyneuropathy, visual field cut.    Now - new onset orthostasis.    MRI brain unremarkable    - diabetic autonomic polyneuropathy vs (less likely) degenerative disorder of CNS with orthostasis (multisistem atrophy/PD)  - please discontinue antihypertensive ( accomplished)  - increase fluid intake  - increase salt intake discussed again with spouse   - avoid supine position - worsens orthostasis  - compressive stockings to the THIGHS (not to the knees)  - can dc home to FU with primary neurologist Dr Zheng  - discussed extensively with the wife, concerned about delerium not resolving when he gets home.   avoid prolonged hospital stay, avoid delerium triggering medications
73yo male with pmhx of IDDM, orthostatic HTN, CVA (partial R eye blindness), Prostate CA currently receiving RT, CAD s/p stents, Parkinson and nephrolithiasis p/w dizziness and unstable gait d/d orthostatic hypotension vs worsening parkinson's vs NPH
73yo male with pmhx of IDDM, orthostatic HTN, Prostate CA currently receiving radiation Tx, CVA (partial R eye blindness), CAD s/p stents, Parkinsos and nephrolithiasis p/w dizziness.
75 yo male with longstanding gait difficulty, likely multifactorial including - parkinson's disease, diabetic polyneuropathy, visual field cut.    Now - new onset orthostasis.    MRI brain unremarkable    - diabetic autonomic polyneuropathy vs (less likely) degenerative disorder of CNS with orthostasis (multisistem atrophy/PD)  - now on midrodrine, one possible addition if needed is mestinon 60 given with the midrodrine has mild effect but doesnt cause supine hypertension  - increase fluid intake  - increase salt intake discussed again with spouse   - avoid supine position - worsens orthostasis  - compressive stockings to the THIGHS   - can dc home to FU with primary neurologist Dr Zheng  - discussed extensively with the wife, concerned about delerium  likely hospital related  neuro cleared for discharge  d/w wife
75 yo male with longstanding gait problems, likely multifactorial including - parkinson's disease, diabetic polyneuropathy, visual field cut.    Now - new onset orthostasis.    MRI brain not remarkable    - diabetic autonomic polyneuropathy vs (less likely) degenerative disorder of CNS with orthostasis (multisistem atrophy/PD)  - please discontinue antihypertensive ( accomplished)  - increase fluid intake  - increase salt intake  - avoid supine position - worsens orthostasis  - compressive stockings to the THIGHS (not to the knees)  - can dc home to FU with primary neurologist Dr Zheng  - pt wife said spoke with cardiologist with office of dr steven goldberg about starting midrodrine, not see such note in chart  - discussed extensively with the wife, son,
75yo male with pmhx of IDDM, orthostatic HTN, CVA (partial R eye blindness), Prostate CA currently receiving RT, CAD s/p stents, Parkinson and nephrolithiasis p/w dizziness and unstable gait d/d orthostatic hypotension vs worsening parkinson's vs NPH
73yo male with pmhx of IDDM, orthostatic HTN, CVA (partial R eye blindness), Prostate CA currently receiving RT, CAD s/p stents, Parkinson and nephrolithiasis p/w dizziness and unstable gait d/d orthostatic hypotension vs worsening parkinson's vs NPH

## 2019-07-24 NOTE — PROGRESS NOTE ADULT - REASON FOR ADMISSION
Dizziness /Vertigo

## 2019-07-24 NOTE — DISCHARGE NOTE PROVIDER - INSTRUCTIONS
Follow a consistent carbohydrate diet with plenty of fresh non-starchy vegetables (Ex: lettuce, broccoli, carrots, cucumbers, celery, kale) and lean protein. Avoid sugar sweetened beverages, whole milk and other dairy products such as yogurt, baked goods/sweets, and candy. Starchy foods such as white bread, white rice, cereal, potatoes, corn, and pasta should also be avoided. The preferred dietary options include whole grain bread, brown rice, whole grain pasta, and foods rich in fiber such as leafy green vegetables, apples, berries, and certain beans.   Many fruits also may cause your blood sugar to increase quickly. It is recommended to limit your fruit servings to 2-3 per day. Limit or avoid fruits high in sugar including grapes, cherries, tropical fruits (xin, pineapple, papaya), melon, banana, fruit juice, and dried fruit. Fruit such as apples, pears, berries, kiwi, grapefruit, and orange are preferred.

## 2019-07-24 NOTE — DISCHARGE NOTE PROVIDER - HOSPITAL COURSE
75 y/o M w/ pmhx of IDDM, orthostatic HTN, Prostate CA currently receiving radiation Tx, CVA (partial R eye blindness), CAD s/p stents, Parkinsos and nephrolithiasis p/w dizziness. Patient was seen for a similar presentation 2 weeks ago in the ER. He reports dizziness unclear if "room spinning" vs lightheadedness worse, states its a combination of both. Per wife he was found to be orthostatic in outpatient medical visits, blood pressures between 80 - 170 systolic. Subcequently PCP  decreased atenolol to QOD and d/c'd amlodipine in light of recent hypotensive events. Patient reports feeling unsteady on his feet and may be drifting to one side when he walks. Wife states she can hear the pt drag his footsteps more than usual since the last few weeks. He follows up with a neurologist for parkinson's and is currently on meds. Per wife Neurologist is not aware of dizziness/ unstable gait.  His symptoms are intermittent and sometimes he is asymptomatic and other times, he can barely walk a straight line. Today patient rolled off of bed onto floor and family thinks symptoms are worsening. He denies any LOC, HA, CP, palpitations, vision changes other than residual partial right sided blindness.        CT head from 2 wks ago w/o any acute changes    Found to be orthostatic positive. Antihypertensives held. Treated with IV fluids. Started on Midodrine with improvement of orthostasis.     Patient remains asymptomatic. Cleared for discharge home 75 y/o M w/ pmhx of IDDM, orthostatic HTN, Prostate CA currently receiving radiation Tx, CVA (partial R eye blindness), CAD s/p stents, Parkinsos and nephrolithiasis p/w dizziness. Patient was seen for a similar presentation 2 weeks ago in the ER. He reports dizziness unclear if "room spinning" vs lightheadedness worse, states its a combination of both. Per wife he was found to be orthostatic in outpatient medical visits, blood pressures between 80 - 170 systolic. Subcequently PCP  decreased atenolol to QOD and d/c'd amlodipine in light of recent hypotensive events. Patient reports feeling unsteady on his feet and may be drifting to one side when he walks. Wife states she can hear the pt drag his footsteps more than usual since the last few weeks. He follows up with a neurologist for parkinson's and is currently on meds. Per wife Neurologist is not aware of dizziness/ unstable gait.  His symptoms are intermittent and sometimes he is asymptomatic and other times, he can barely walk a straight line. Today patient rolled off of bed onto floor and family thinks symptoms are worsening. He denies any LOC, HA, CP, palpitations, vision changes other than residual partial right sided blindness.        CT head from 2 wks ago w/o any acute changes    Found to be orthostatic positive. Antihypertensives held. Treated with IV fluids. Started on Midodrine with improvement of orthostasis.     Patient remains asymptomatic. Cleared for discharge home         PE on day of DC:    GENERAL: NAD, AAOx3    HEAD:  Atraumatic, Normocephalic    EYES: EOMI, PERRLA, conjunctiva and sclera clear    NECK: Supple, No JVD, No LAD    CHEST/LUNG: CTABL, No wheeze    HEART: Regular rate and rhythm; No murmurs, rubs, or gallops    ABDOMEN: Soft, Nontender, Nondistended; Bowel sounds present    EXTREMITIES:  2+ Peripheral Pulses, No clubbing, cyanosis, or edema    SKIN: No rashes or lesions

## 2019-07-24 NOTE — PROGRESS NOTE ADULT - SUBJECTIVE AND OBJECTIVE BOX
Admitting Diagnosis:  Dizziness and giddiness (R42): DIZZINESS AND GIDDINESS      HPI:  This is a 74y year old Male with the below past medical history of IDDM, orthostatic HTN, Prostate CA currently receiving radiation Tx, stroke ( R visual field cut), CAD s/p stents, Parkinson's disease and nephrolithiasis p/w dizziness. Patient was seen for a similar presentation 2 weeks ago in the ER. He reports lightheadedness with attempt to get up. Per wife he was found to be orthostatic in outpatient medical visits, blood pressures between 80 - 170 systolic. Subcequently PCP  decreased atenolol to QOD and d/c'd amlodipine in light of recent hypotensive events. Patient reports feeling unsteady on his feet and may be drifting to one side when he walks.  His symptoms are intermittent and sometimes he is asymptomatic and other times, he can barely walk a straight line. Today patient rolled off of bed onto floor and family thinks symptoms are worsening. He denies any LOC, HA, weakness, numbness vision changes other than residual partial right sided blindness.      bp still orthostatic, started midodrine yesterday. sitting up in bed eating breakfast.  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)  Kidney stone (592.0)  CRI (chronic renal insufficiency) (585.9)  CAD (coronary artery disease) (414.00)  Diabetes (250.00)  CVA (cerebral infarction) (434.91)  Hyperlipidemia (272.4)  Hypertension (401.9)      Past Surgical History:  S/P tonsillectomy and adenoidectomy (V45.89): at age 31  S/P cataract surgery (V45.61)  Status post placement of implantable loop recorder (V45.09): May 2015  Kidney stones (592.0): 9/16/19  S/P appendectomy (V45.89): age 16  S/P coronary artery stent placement (V45.82): 12/21/10  No significant past surgical history ([id number])      Social History:  No toxic habits    Family History:  FAMILY HISTORY:  No pertinent family history in first degree relatives      Allergies:  No Known Allergies      ROS:  Constitutional: Patient offers no complaints of fevers or significant weight loss  Ears, Nose, Mouth and Throat: The patient presents with no abnormalities of the head, ears, eyes, nose or throat  Skin: Patient offers no concerns of new rashes or lesions  Respiratory: The patient presents with no abnormalities of the respiratory tract  Cardiovascular: The patient presents with no cardiac abnormalities  Gastrointestinal: The patient presents with no abnormalities of the GI system  Genitourinary: The patient presents with no dysuria, hematuria or frequent urination  Neurological: See HPI  Endocrine: Patient offers no complaints of excessive thirst, urination, or heat/cold intolerance    Advanced care planning reviewed and noted in the chart.    Medications:  allopurinol 300 milliGRAM(s) Oral daily  atorvastatin 80 milliGRAM(s) Oral at bedtime  carbidopa/levodopa  25/100 2 Tablet(s) Oral every 6 hours  clopidogrel Tablet 75 milliGRAM(s) Oral daily  cyanocobalamin 1000 MICROGram(s) Oral daily  dextrose 40% Gel 15 Gram(s) Oral once PRN  dextrose 5%. 1000 milliLiter(s) IV Continuous <Continuous>  dextrose 50% Injectable 12.5 Gram(s) IV Push once  dextrose 50% Injectable 25 Gram(s) IV Push once  dextrose 50% Injectable 25 Gram(s) IV Push once  docusate sodium 100 milliGRAM(s) Oral three times a day  enoxaparin Injectable 40 milliGRAM(s) SubCutaneous daily  glucagon  Injectable 1 milliGRAM(s) IntraMuscular once PRN  insulin glargine Injectable (LANTUS) 10 Unit(s) SubCutaneous every morning  insulin lispro (HumaLOG) corrective regimen sliding scale   SubCutaneous three times a day before meals  insulin lispro Injectable (HumaLOG) 3 Unit(s) SubCutaneous three times a day before meals  levothyroxine 75 MICROGram(s) Oral daily  midodrine 2.5 milliGRAM(s) Oral three times a day  polyethylene glycol 3350 17 Gram(s) Oral daily  pramipexole 0.125 milliGRAM(s) Oral every 6 hours  senna 2 Tablet(s) Oral at bedtime  sodium chloride 0.9%. 1000 milliLiter(s) IV Continuous <Continuous>      Labs:  CBC Full  -  ( 23 Jul 2019 09:10 )  WBC Count : 8.95 K/uL  RBC Count : 3.42 M/uL  Hemoglobin : 10.2 g/dL  Hematocrit : 30.7 %  Platelet Count - Automated : 146 K/uL  Mean Cell Volume : 89.8 fl  Mean Cell Hemoglobin : 29.8 pg  Mean Cell Hemoglobin Concentration : 33.2 gm/dL  Auto Neutrophil # : x  Auto Lymphocyte # : x  Auto Monocyte # : x  Auto Eosinophil # : x  Auto Basophil # : x  Auto Neutrophil % : x  Auto Lymphocyte % : x  Auto Monocyte % : x  Auto Eosinophil % : x  Auto Basophil % : x    07-24    138  |  104  |  39<H>  ----------------------------<  141<H>  4.3   |  18<L>  |  1.45<H>    Ca    9.3      24 Jul 2019 06:28      CAPILLARY BLOOD GLUCOSE      POCT Blood Glucose.: 172 mg/dL (24 Jul 2019 07:55)  POCT Blood Glucose.: 175 mg/dL (23 Jul 2019 21:24)  POCT Blood Glucose.: 157 mg/dL (23 Jul 2019 16:42)  POCT Blood Glucose.: 155 mg/dL (23 Jul 2019 11:47)              Vitals:  Vital Signs Last 24 Hrs  T(C): 36.4 (24 Jul 2019 04:35), Max: 36.7 (23 Jul 2019 21:09)  T(F): 97.5 (24 Jul 2019 04:35), Max: 98 (23 Jul 2019 21:09)  HR: 69 (24 Jul 2019 04:35) (69 - 79)  BP: 143/79 (24 Jul 2019 04:35) (124/72 - 177/82)  BP(mean): --  RR: 18 (24 Jul 2019 04:35) (18 - 18)  SpO2: 98% (24 Jul 2019 04:35) (98% - 98%)    NEUROLOGICAL EXAM:    Mental status: Awake, alert, and in no apparent distress, in good spirits, pleasant tangential thoughts. Oriented to person, place and not to time. Language function is normal. Recent memory, digit span and concentration were normal.     Cranial Nerves: Pupils were equal, round, reactive to light. Extraocular movements were intact. Visual field were restricted on the right. . Facial sensation was intact to light touch. There was no facial asymmetry. The palate was upgoing symmetrically and tongue was midline. Hearing acuity was intact to finger rub AU. Shoulder shrug was full bilaterally    Motor exam: Bulk and tone were normal. Strength was 5/5 in all four extremities. Fine finger movements were symmetric and normal. + cogwheel rigidity present b/l UEs    Reflexes: 2+ in the bilateral upper extremities. 2+ in the bilateral lower extremities. Toes were downgoing bilaterally.     Sensation: Intact to light touch, temperature, vibration and proprioception b/l UE and LE.     Coordination: Finger-nose-finger and heel-to-shin was without dysmetria.     Gait: deferred        < from: MR Head No Cont (07.21.19 @ 22:54) >    EXAM:  MR BRAIN                            PROCEDURE DATE:  07/21/2019            INTERPRETATION:  History: Dizziness, confusion and status post fall.   Patient has history of Parkinson's disease and strokes.    Technique: MR imaging of the brain was performed according to routine   departmental protocol utilizing sagittal T1, axial T1, T2, FLAIR,   gradient-echo, susceptibility and diffusion-weighted imaging.    Findings: Comparison is made with the prior CT dated 7/4/2019.    The images are somewhat degraded by excess patient motion. There is no   evidence of acute infarction, hemorrhage or mass. The diffusion-weighted   imaging is unremarkable. There is minimal patchy T2 prolongation   predominantly involving the periventricular white matter bilaterally,   likely representing mild chronic microvascular ischemic disease. Chronic   lacunar infarction is seen in the right centrum semiovale. The ventricles   are stable in size and configuration. There is no evidence of extra-axial   collections or midline shift.    Stable calcification in the left frontal extra-axial region which may   represent a calcified meningioma or possibly hyperostosis. There is no   significant associated mass effect. The visualized paranasal sinuses   appearunremarkable.    Impression: No evidence of acute infarction or hemorrhage, compared with   CT dated 7/4/2019.    Mild chronic microvascular ischemic disease, as described above. Chronic   lacunar infarction in the right centrum semiovale.    Stable calcification in the left frontal extra-axial region which may   represent a calcified meningioma or possibly hyperostosis.                     ADELSO PEREZ M.D., ATTENDING RADIOLOGIST  This document has been electronically signed. Jul 22 2019  7:10AM                < end of copied text >
Admitting Diagnosis:  Dizziness and giddiness (R42): DIZZINESS AND GIDDINESS      HPI:  This is a 74y year old Male with the below past medical history of IDDM, orthostatic HTN, Prostate CA currently receiving radiation Tx, stroke ( R visual field cut), CAD s/p stents, Parkinson's disease and nephrolithiasis p/w dizziness. Patient was seen for a similar presentation 2 weeks ago in the ER. He reports lightheadedness with attempt to get up. Per wife he was found to be orthostatic in outpatient medical visits, blood pressures between 80 - 170 systolic. Subcequently PCP  decreased atenolol to QOD and d/c'd amlodipine in light of recent hypotensive events. Patient reports feeling unsteady on his feet and may be drifting to one side when he walks.  His symptoms are intermittent and sometimes he is asymptomatic and other times, he can barely walk a straight line. Today patient rolled off of bed onto floor and family thinks symptoms are worsening. He denies any LOC, HA, weakness, numbness vision changes other than residual partial right sided blindness.      bp still orthostatic, started midodrine yesterday. sitting up in bed eating breakfast. spouse complaints that he is more confused than other days and she is worried if he'll get back to baseline cognition and be ok at home. 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)  Kidney stone (592.0)  CRI (chronic renal insufficiency) (585.9)  CAD (coronary artery disease) (414.00)  Diabetes (250.00)  CVA (cerebral infarction) (434.91)  Hyperlipidemia (272.4)  Hypertension (401.9)      Past Surgical History:  S/P tonsillectomy and adenoidectomy (V45.89): at age 31  S/P cataract surgery (V45.61)  Status post placement of implantable loop recorder (V45.09): May 2015  Kidney stones (592.0): 9/16/19  S/P appendectomy (V45.89): age 16  S/P coronary artery stent placement (V45.82): 12/21/10  No significant past surgical history ([id number])      Social History:  No toxic habits    Family History:  FAMILY HISTORY:  No pertinent family history in first degree relatives      Allergies:  No Known Allergies      ROS:  Constitutional: Patient offers no complaints of fevers or significant weight loss  Ears, Nose, Mouth and Throat: The patient presents with no abnormalities of the head, ears, eyes, nose or throat  Skin: Patient offers no concerns of new rashes or lesions  Respiratory: The patient presents with no abnormalities of the respiratory tract  Cardiovascular: The patient presents with no cardiac abnormalities  Gastrointestinal: The patient presents with no abnormalities of the GI system  Genitourinary: The patient presents with no dysuria, hematuria or frequent urination  Neurological: See HPI  Endocrine: Patient offers no complaints of excessive thirst, urination, or heat/cold intolerance    Advanced care planning reviewed and noted in the chart.    Medications:  allopurinol 300 milliGRAM(s) Oral daily  atorvastatin 80 milliGRAM(s) Oral at bedtime  carbidopa/levodopa  25/100 2 Tablet(s) Oral every 6 hours  clopidogrel Tablet 75 milliGRAM(s) Oral daily  cyanocobalamin 1000 MICROGram(s) Oral daily  dextrose 40% Gel 15 Gram(s) Oral once PRN  dextrose 5%. 1000 milliLiter(s) IV Continuous <Continuous>  dextrose 50% Injectable 12.5 Gram(s) IV Push once  dextrose 50% Injectable 25 Gram(s) IV Push once  dextrose 50% Injectable 25 Gram(s) IV Push once  docusate sodium 100 milliGRAM(s) Oral three times a day  enoxaparin Injectable 40 milliGRAM(s) SubCutaneous daily  glucagon  Injectable 1 milliGRAM(s) IntraMuscular once PRN  insulin glargine Injectable (LANTUS) 10 Unit(s) SubCutaneous every morning  insulin lispro (HumaLOG) corrective regimen sliding scale   SubCutaneous three times a day before meals  insulin lispro Injectable (HumaLOG) 3 Unit(s) SubCutaneous three times a day before meals  levothyroxine 75 MICROGram(s) Oral daily  midodrine 2.5 milliGRAM(s) Oral three times a day  polyethylene glycol 3350 17 Gram(s) Oral daily  pramipexole 0.125 milliGRAM(s) Oral every 6 hours  senna 2 Tablet(s) Oral at bedtime  sodium chloride 0.9%. 1000 milliLiter(s) IV Continuous <Continuous>      Labs:  CBC Full  -  ( 23 Jul 2019 09:10 )  WBC Count : 8.95 K/uL  RBC Count : 3.42 M/uL  Hemoglobin : 10.2 g/dL  Hematocrit : 30.7 %  Platelet Count - Automated : 146 K/uL  Mean Cell Volume : 89.8 fl  Mean Cell Hemoglobin : 29.8 pg  Mean Cell Hemoglobin Concentration : 33.2 gm/dL  Auto Neutrophil # : x  Auto Lymphocyte # : x  Auto Monocyte # : x  Auto Eosinophil # : x  Auto Basophil # : x  Auto Neutrophil % : x  Auto Lymphocyte % : x  Auto Monocyte % : x  Auto Eosinophil % : x  Auto Basophil % : x    07-23    137  |  104  |  40<H>  ----------------------------<  140<H>  4.2   |  22  |  1.62<H>    Ca    9.5      23 Jul 2019 06:01    TPro  6.8  /  Alb  3.9  /  TBili  0.6  /  DBili  x   /  AST  10  /  ALT  <5<L>  /  AlkPhos  126<H>  07-22    CAPILLARY BLOOD GLUCOSE      POCT Blood Glucose.: 155 mg/dL (23 Jul 2019 11:47)  POCT Blood Glucose.: 166 mg/dL (23 Jul 2019 08:26)  POCT Blood Glucose.: 130 mg/dL (22 Jul 2019 21:54)  POCT Blood Glucose.: 108 mg/dL (22 Jul 2019 16:34)    LIVER FUNCTIONS - ( 22 Jul 2019 05:35 )  Alb: 3.9 g/dL / Pro: 6.8 g/dL / ALK PHOS: 126 U/L / ALT: <5 U/L / AST: 10 U/L / GGT: x                   Vitals:  Vital Signs Last 24 Hrs  T(C): 36.5 (23 Jul 2019 04:00), Max: 36.5 (22 Jul 2019 20:40)  T(F): 97.7 (23 Jul 2019 04:00), Max: 97.7 (22 Jul 2019 20:40)  HR: 84 (23 Jul 2019 04:00) (77 - 85)  BP: 174/87 (23 Jul 2019 04:00) (121/69 - 174/87)  BP(mean): --  RR: 18 (23 Jul 2019 04:00) (18 - 18)  SpO2: 96% (23 Jul 2019 04:00) (93% - 100%)    NEUROLOGICAL EXAM:    Mental status: Awake, alert, and in no apparent distress, in good spirits, pleasant tangential thoughts. Oriented to person, place and not to time. Language function is normal. Recent memory, digit span and concentration were normal.     Cranial Nerves: Pupils were equal, round, reactive to light. Extraocular movements were intact. Visual field were restricted on the right. . Facial sensation was intact to light touch. There was no facial asymmetry. The palate was upgoing symmetrically and tongue was midline. Hearing acuity was intact to finger rub AU. Shoulder shrug was full bilaterally    Motor exam: Bulk and tone were normal. Strength was 5/5 in all four extremities. Fine finger movements were symmetric and normal. + cogwheel rigidity present b/l UEs    Reflexes: 2+ in the bilateral upper extremities. 2+ in the bilateral lower extremities. Toes were downgoing bilaterally.     Sensation: Intact to light touch, temperature, vibration and proprioception b/l UE and LE.     Coordination: Finger-nose-finger and heel-to-shin was without dysmetria.     Gait: deferred        < from: MR Head No Cont (07.21.19 @ 22:54) >    EXAM:  MR BRAIN                            PROCEDURE DATE:  07/21/2019            INTERPRETATION:  History: Dizziness, confusion and status post fall.   Patient has history of Parkinson's disease and strokes.    Technique: MR imaging of the brain was performed according to routine   departmental protocol utilizing sagittal T1, axial T1, T2, FLAIR,   gradient-echo, susceptibility and diffusion-weighted imaging.    Findings: Comparison is made with the prior CT dated 7/4/2019.    The images are somewhat degraded by excess patient motion. There is no   evidence of acute infarction, hemorrhage or mass. The diffusion-weighted   imaging is unremarkable. There is minimal patchy T2 prolongation   predominantly involving the periventricular white matter bilaterally,   likely representing mild chronic microvascular ischemic disease. Chronic   lacunar infarction is seen in the right centrum semiovale. The ventricles   are stable in size and configuration. There is no evidence of extra-axial   collections or midline shift.    Stable calcification in the left frontal extra-axial region which may   represent a calcified meningioma or possibly hyperostosis. There is no   significant associated mass effect. The visualized paranasal sinuses   appearunremarkable.    Impression: No evidence of acute infarction or hemorrhage, compared with   CT dated 7/4/2019.    Mild chronic microvascular ischemic disease, as described above. Chronic   lacunar infarction in the right centrum semiovale.    Stable calcification in the left frontal extra-axial region which may   represent a calcified meningioma or possibly hyperostosis.                     ADELSO PEREZ M.D., ATTENDING RADIOLOGIST  This document has been electronically signed. Jul 22 2019  7:10AM                < end of copied text >
Admitting Diagnosis:  Dizziness and giddiness (R42): DIZZINESS AND GIDDINESS      HPI:  This is a 74y year old Male with the below past medical history of IDDM, orthostatic HTN, Prostate CA currently receiving radiation Tx, stroke ( R visual field cut), CAD s/p stents, Parkinson's disease and nephrolithiasis p/w dizziness. Patient was seen for a similar presentation 2 weeks ago in the ER. He reports lightheadedness with attempt to get up. Per wife he was found to be orthostatic in outpatient medical visits, blood pressures between 80 - 170 systolic. Subcequently PCP  decreased atenolol to QOD and d/c'd amlodipine in light of recent hypotensive events. Patient reports feeling unsteady on his feet and may be drifting to one side when he walks.  His symptoms are intermittent and sometimes he is asymptomatic and other times, he can barely walk a straight line. Today patient rolled off of bed onto floor and family thinks symptoms are worsening. He denies any LOC, HA, weakness, numbness vision changes other than residual partial right sided blindness.    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)  Kidney stone (592.0)  CRI (chronic renal insufficiency) (585.9)  CAD (coronary artery disease) (414.00)  Diabetes (250.00)  CVA (cerebral infarction) (434.91)  Hyperlipidemia (272.4)  Hypertension (401.9)      Past Surgical History:  S/P tonsillectomy and adenoidectomy (V45.89): at age 31  S/P cataract surgery (V45.61)  Status post placement of implantable loop recorder (V45.09): May 2015  Kidney stones (592.0): 9/16/19  S/P appendectomy (V45.89): age 16  S/P coronary artery stent placement (V45.82): 12/21/10  No significant past surgical history ([id number])      Social History:  No toxic habits    Family History:  FAMILY HISTORY:  No pertinent family history in first degree relatives      Allergies:  No Known Allergies      ROS:  Constitutional: Patient offers no complaints of fevers or significant weight loss  Ears, Nose, Mouth and Throat: The patient presents with no abnormalities of the head, ears, eyes, nose or throat  Skin: Patient offers no concerns of new rashes or lesions  Respiratory: The patient presents with no abnormalities of the respiratory tract  Cardiovascular: The patient presents with no cardiac abnormalities  Gastrointestinal: The patient presents with no abnormalities of the GI system  Genitourinary: The patient presents with no dysuria, hematuria or frequent urination  Neurological: See HPI  Endocrine: Patient offers no complaints of excessive thirst, urination, or heat/cold intolerance    Advanced care planning reviewed and noted in the chart.    Medications:  allopurinol 300 milliGRAM(s) Oral daily  atorvastatin 80 milliGRAM(s) Oral at bedtime  carbidopa/levodopa  25/100 2 Tablet(s) Oral every 6 hours  clopidogrel Tablet 75 milliGRAM(s) Oral daily  cyanocobalamin 1000 MICROGram(s) Oral daily  dextrose 40% Gel 15 Gram(s) Oral once PRN  dextrose 5%. 1000 milliLiter(s) IV Continuous <Continuous>  dextrose 50% Injectable 12.5 Gram(s) IV Push once  dextrose 50% Injectable 25 Gram(s) IV Push once  dextrose 50% Injectable 25 Gram(s) IV Push once  docusate sodium 100 milliGRAM(s) Oral three times a day  enoxaparin Injectable 40 milliGRAM(s) SubCutaneous daily  glucagon  Injectable 1 milliGRAM(s) IntraMuscular once PRN  insulin glargine Injectable (LANTUS) 10 Unit(s) SubCutaneous every morning  insulin lispro (HumaLOG) corrective regimen sliding scale   SubCutaneous three times a day before meals  insulin lispro Injectable (HumaLOG) 3 Unit(s) SubCutaneous three times a day before meals  levothyroxine 75 MICROGram(s) Oral daily  polyethylene glycol 3350 17 Gram(s) Oral daily  pramipexole 0.125 milliGRAM(s) Oral every 6 hours  senna 2 Tablet(s) Oral at bedtime  sodium chloride 0.9%. 1000 milliLiter(s) IV Continuous <Continuous>      Labs:  CBC Full  -  ( 22 Jul 2019 08:41 )  WBC Count : 8.12 K/uL  RBC Count : 3.37 M/uL  Hemoglobin : 10.1 g/dL  Hematocrit : 30.6 %  Platelet Count - Automated : 138 K/uL  Mean Cell Volume : 90.8 fl  Mean Cell Hemoglobin : 30.0 pg  Mean Cell Hemoglobin Concentration : 33.0 gm/dL  Auto Neutrophil # : 5.32 K/uL  Auto Lymphocyte # : 0.87 K/uL  Auto Monocyte # : 0.67 K/uL  Auto Eosinophil # : 1.20 K/uL  Auto Basophil # : 0.03 K/uL  Auto Neutrophil % : 65.4 %  Auto Lymphocyte % : 10.7 %  Auto Monocyte % : 8.3 %  Auto Eosinophil % : 14.8 %  Auto Basophil % : 0.4 %    07-22    140  |  102  |  42<H>  ----------------------------<  152<H>  4.1   |  22  |  1.70<H>    Ca    9.6      22 Jul 2019 05:35    TPro  6.8  /  Alb  3.9  /  TBili  0.6  /  DBili  x   /  AST  10  /  ALT  <5<L>  /  AlkPhos  126<H>  07-22    CAPILLARY BLOOD GLUCOSE      POCT Blood Glucose.: 188 mg/dL (22 Jul 2019 08:00)  POCT Blood Glucose.: 163 mg/dL (21 Jul 2019 21:32)  POCT Blood Glucose.: 146 mg/dL (21 Jul 2019 16:32)  POCT Blood Glucose.: 247 mg/dL (21 Jul 2019 12:07)    LIVER FUNCTIONS - ( 22 Jul 2019 05:35 )  Alb: 3.9 g/dL / Pro: 6.8 g/dL / ALK PHOS: 126 U/L / ALT: <5 U/L / AST: 10 U/L / GGT: x                   Vitals:  Vital Signs Last 24 Hrs  T(C): 36.4 (22 Jul 2019 04:14), Max: 36.8 (21 Jul 2019 21:06)  T(F): 97.5 (22 Jul 2019 04:14), Max: 98.3 (21 Jul 2019 21:06)  HR: 70 (22 Jul 2019 09:14) (66 - 75)  BP: 109/59 (22 Jul 2019 09:14) (105/63 - 173/83)  BP(mean): --  RR: 18 (22 Jul 2019 04:14) (18 - 18)  SpO2: 99% (22 Jul 2019 08:54) (97% - 100%)      NEUROLOGICAL EXAM:    Mental status: Awake, alert, and in no apparent distress. Oriented to person, place and time. Language function is normal. Recent memory, digit span and concentration were normal.     Cranial Nerves: Pupils were equal, round, reactive to light. Extraocular movements were intact. Visual field were restricted on the right. Fundoscopic exam was deferred. Facial sensation was intact to light touch. There was no facial asymmetry. The palate was upgoing symmetrically and tongue was midline. Hearing acuity was intact to finger rub AU. Shoulder shrug was full bilaterally    Motor exam: Bulk and tone were normal. Strength was 5/5 in all four extremities. Fine finger movements were symmetric and normal. cogwheel rigidity    Reflexes: 2+ in the bilateral upper extremities. 2+ in the bilateral lower extremities. Toes were downgoing bilaterally.     Sensation: Intact to light touch, temperature, vibration and proprioception.     Coordination: Finger-nose-finger and heel-to-shin was without dysmetria.     Gait: defered        < from: MR Head No Cont (07.21.19 @ 22:54) >    EXAM:  MR BRAIN                            PROCEDURE DATE:  07/21/2019            INTERPRETATION:  History: Dizziness, confusion and status post fall.   Patient has history of Parkinson's disease and strokes.    Technique: MR imaging of the brain was performed according to routine   departmental protocol utilizing sagittal T1, axial T1, T2, FLAIR,   gradient-echo, susceptibility and diffusion-weighted imaging.    Findings: Comparison is made with the prior CT dated 7/4/2019.    The images are somewhat degraded by excess patient motion. There is no   evidence of acute infarction, hemorrhage or mass. The diffusion-weighted   imaging is unremarkable. There is minimal patchy T2 prolongation   predominantly involving the periventricular white matter bilaterally,   likely representing mild chronic microvascular ischemic disease. Chronic   lacunar infarction is seen in the right centrum semiovale. The ventricles   are stable in size and configuration. There is no evidence of extra-axial   collections or midline shift.    Stable calcification in the left frontal extra-axial region which may   represent a calcified meningioma or possibly hyperostosis. There is no   significant associated mass effect. The visualized paranasal sinuses   appearunremarkable.    Impression: No evidence of acute infarction or hemorrhage, compared with   CT dated 7/4/2019.    Mild chronic microvascular ischemic disease, as described above. Chronic   lacunar infarction in the right centrum semiovale.    Stable calcification in the left frontal extra-axial region which may   represent a calcified meningioma or possibly hyperostosis.                     ADELSO PEREZ M.D., ATTENDING RADIOLOGIST  This document has been electronically signed. Jul 22 2019  7:10AM                < end of copied text >
Patient is a 74y old  Male who presents with a chief complaint of Dizziness /Vertigo (2019 18:20)      INTERVAL HPI/OVERNIGHT EVENTS: c/o dizziness this am, orthostatic      Vital Signs Last 24 Hrs  T(C): 36.6 (2019 11:39), Max: 37.1 (2019 22:21)  T(F): 97.8 (2019 11:39), Max: 98.7 (2019 22:21)  HR: 73 (2019 11:39) (73 - 82)  BP: 151/82 (2019 11:39) (121/72 - 151/82)  BP(mean): 99 (2019 18:20) (99 - 99)  RR: 18 (2019 11:39) (16 - 18)  SpO2: 98% (2019 11:39) (92% - 98%)    allopurinol 300 milliGRAM(s) Oral daily  atorvastatin 80 milliGRAM(s) Oral at bedtime  carbidopa/levodopa  25/100 2 Tablet(s) Oral every 6 hours  clopidogrel Tablet 75 milliGRAM(s) Oral daily  cyanocobalamin 1000 MICROGram(s) Oral daily  dextrose 40% Gel 15 Gram(s) Oral once PRN  dextrose 5%. 1000 milliLiter(s) IV Continuous <Continuous>  dextrose 50% Injectable 12.5 Gram(s) IV Push once  dextrose 50% Injectable 25 Gram(s) IV Push once  dextrose 50% Injectable 25 Gram(s) IV Push once  docusate sodium 100 milliGRAM(s) Oral three times a day  enoxaparin Injectable 40 milliGRAM(s) SubCutaneous daily  glucagon  Injectable 1 milliGRAM(s) IntraMuscular once PRN  insulin glargine Injectable (LANTUS) 10 Unit(s) SubCutaneous every morning  insulin lispro (HumaLOG) corrective regimen sliding scale   SubCutaneous three times a day before meals  insulin lispro Injectable (HumaLOG) 3 Unit(s) SubCutaneous three times a day before meals  levothyroxine 75 MICROGram(s) Oral daily  polyethylene glycol 3350 17 Gram(s) Oral daily  pramipexole 0.125 milliGRAM(s) Oral every 6 hours  senna 2 Tablet(s) Oral at bedtime      PHYSICAL EXAM:  GENERAL: NAD,   EYES: conjunctiva and sclera clear  ENMT: Moist mucous membranes  NECK: Supple, No JVD, Normal thyroid  NERVOUS SYSTEM:  Alert & Oriented X,   CHEST/LUNG: Clear to auscultation bilaterally; No rales, rhonchi, wheezing, or rubs  HEART: Regular rate and rhythm; No murmurs, rubs, or gallops  ABDOMEN: Soft, Nontender, Nondistended; Bowel sounds present  EXTREMITIES:  2+ Peripheral Pulses, No clubbing, cyanosis, or edema  LYMPH: No lymphadenopathy noted  SKIN: No rashes or lesions    Consultant(s) Notes Reviewed:  [x ] YES  [ ] NO  Care Discussed with Consultants/Other Providers [ x] YES  [ ] NO    LABS:                        11.1   10.7  )-----------( 143      ( 2019 14:36 )             31.8         134<L>  |  97  |  43<H>  ----------------------------<  89  3.9   |  23  |  1.70<H>    Ca    9.6      2019 14:36    TPro  7.4  /  Alb  4.5  /  TBili  0.6  /  DBili  x   /  AST  11  /  ALT  <5<L>  /  AlkPhos  146<H>        Urinalysis Basic - ( 2019 15:53 )    Color: Light Yellow / Appearance: Clear / S.010 / pH: x  Gluc: x / Ketone: Negative  / Bili: Negative / Urobili: Negative   Blood: x / Protein: Trace / Nitrite: Negative   Leuk Esterase: Negative / RBC: 1 /hpf / WBC 1 /HPF   Sq Epi: x / Non Sq Epi: 0 /hpf / Bacteria: Negative      CAPILLARY BLOOD GLUCOSE      POCT Blood Glucose.: 179 mg/dL (2019 11:50)  POCT Blood Glucose.: 126 mg/dL (2019 08:13)  POCT Blood Glucose.: 126 mg/dL (2019 00:00)  POCT Blood Glucose.: 163 mg/dL (2019 22:23)  POCT Blood Glucose.: 81 mg/dL (2019 16:07)        Urinalysis Basic - ( 2019 15:53 )    Color: Light Yellow / Appearance: Clear / S.010 / pH: x  Gluc: x / Ketone: Negative  / Bili: Negative / Urobili: Negative   Blood: x / Protein: Trace / Nitrite: Negative   Leuk Esterase: Negative / RBC: 1 /hpf / WBC 1 /HPF   Sq Epi: x / Non Sq Epi: 0 /hpf / Bacteria: Negative          RADIOLOGY & ADDITIONAL TESTS:    Imaging Personally Reviewed:  [x ] YES  [ ] NO
Patient is a 74y old  Male who presents with a chief complaint of Dizziness /Vertigo (22 Jul 2019 09:43)      INTERVAL HPI/OVERNIGHT EVENTS: noted, feels well      Vital Signs Last 24 Hrs  T(C): 36.3 (22 Jul 2019 14:27), Max: 36.8 (21 Jul 2019 21:06)  T(F): 97.4 (22 Jul 2019 14:27), Max: 98.3 (21 Jul 2019 21:06)  HR: 77 (22 Jul 2019 14:27) (70 - 77)  BP: 121/69 (22 Jul 2019 14:27) (109/59 - 173/83)  BP(mean): --  RR: 18 (22 Jul 2019 14:27) (18 - 18)  SpO2: 93% (22 Jul 2019 14:27) (93% - 99%)    allopurinol 300 milliGRAM(s) Oral daily  atorvastatin 80 milliGRAM(s) Oral at bedtime  carbidopa/levodopa  25/100 2 Tablet(s) Oral every 6 hours  clopidogrel Tablet 75 milliGRAM(s) Oral daily  cyanocobalamin 1000 MICROGram(s) Oral daily  dextrose 40% Gel 15 Gram(s) Oral once PRN  dextrose 5%. 1000 milliLiter(s) IV Continuous <Continuous>  dextrose 50% Injectable 12.5 Gram(s) IV Push once  dextrose 50% Injectable 25 Gram(s) IV Push once  dextrose 50% Injectable 25 Gram(s) IV Push once  docusate sodium 100 milliGRAM(s) Oral three times a day  enoxaparin Injectable 40 milliGRAM(s) SubCutaneous daily  glucagon  Injectable 1 milliGRAM(s) IntraMuscular once PRN  insulin glargine Injectable (LANTUS) 10 Unit(s) SubCutaneous every morning  insulin lispro (HumaLOG) corrective regimen sliding scale   SubCutaneous three times a day before meals  insulin lispro Injectable (HumaLOG) 3 Unit(s) SubCutaneous three times a day before meals  levothyroxine 75 MICROGram(s) Oral daily  midodrine 2.5 milliGRAM(s) Oral three times a day  polyethylene glycol 3350 17 Gram(s) Oral daily  pramipexole 0.125 milliGRAM(s) Oral every 6 hours  senna 2 Tablet(s) Oral at bedtime  sodium chloride 0.9%. 1000 milliLiter(s) IV Continuous <Continuous>      PHYSICAL EXAM:  GENERAL: NAD,   EYES: conjunctiva and sclera clear  ENMT: Moist mucous membranes  NECK: Supple, No JVD, Normal thyroid  NERVOUS SYSTEM:  Alert & Oriented X,   CHEST/LUNG: Clear to auscultation bilaterally; No rales, rhonchi, wheezing, or rubs  HEART: Regular rate and rhythm; No murmurs, rubs, or gallops  ABDOMEN: Soft, Nontender, Nondistended; Bowel sounds present  EXTREMITIES:  2+ Peripheral Pulses, No clubbing, cyanosis, or edema  LYMPH: No lymphadenopathy noted  SKIN: No rashes or lesions    Consultant(s) Notes Reviewed:  [x ] YES  [ ] NO  Care Discussed with Consultants/Other Providers [ x] YES  [ ] NO    LABS:                        10.1   8.12  )-----------( 138      ( 22 Jul 2019 08:41 )             30.6     07-22    140  |  102  |  42<H>  ----------------------------<  152<H>  4.1   |  22  |  1.70<H>    Ca    9.6      22 Jul 2019 05:35    TPro  6.8  /  Alb  3.9  /  TBili  0.6  /  DBili  x   /  AST  10  /  ALT  <5<L>  /  AlkPhos  126<H>  07-22        CAPILLARY BLOOD GLUCOSE      POCT Blood Glucose.: 157 mg/dL (22 Jul 2019 12:10)  POCT Blood Glucose.: 188 mg/dL (22 Jul 2019 08:00)  POCT Blood Glucose.: 163 mg/dL (21 Jul 2019 21:32)  POCT Blood Glucose.: 146 mg/dL (21 Jul 2019 16:32)              RADIOLOGY & ADDITIONAL TESTS:    Imaging Personally Reviewed:  [x ] YES  [ ] NO
[facility] Cardiology Progress Note  _______________________________    Pt. seen and examined. No new cardiac-related complaints.    Telemetry -sinus 60-80s    T(C): 36.4 (07-24-19 @ 04:35), Max: 36.7 (07-23-19 @ 21:09)  HR: 69 (07-24-19 @ 04:35) (69 - 79)  BP: 143/79 (07-24-19 @ 04:35) (124/72 - 177/82)  RR: 18 (07-24-19 @ 04:35) (18 - 18)  SpO2: 98% (07-24-19 @ 04:35) (98% - 98%)  I&O's Summary    23 Jul 2019 07:01  -  24 Jul 2019 07:00  --------------------------------------------------------  IN: 420 mL / OUT: 0 mL / NET: 420 mL        PHYSICAL EXAM:  GENERAL: Alert, NAD.  NECK: Supple  CHEST/LUNG: Clear to auscultation bilaterally; No wheezes, rales, or rhonchi.  HEART: S1 S2 normal, RRR; No murmurs, rubs, or gallops  ABDOMEN: Soft, Nondistended  EXTREMITIES:  No LE edema.      LABS:                        9.6    7.41  )-----------( 102      ( 24 Jul 2019 09:29 )             31.7     07-24    138  |  104  |  39<H>  ----------------------------<  141<H>  4.3   |  18<L>  |  1.45<H>    Ca    9.3      24 Jul 2019 06:28                    MEDICATIONS  (STANDING):  allopurinol 300 milliGRAM(s) Oral daily  atorvastatin 80 milliGRAM(s) Oral at bedtime  carbidopa/levodopa  25/100 2 Tablet(s) Oral every 6 hours  clopidogrel Tablet 75 milliGRAM(s) Oral daily  cyanocobalamin 1000 MICROGram(s) Oral daily  dextrose 5%. 1000 milliLiter(s) (50 mL/Hr) IV Continuous <Continuous>  dextrose 50% Injectable 12.5 Gram(s) IV Push once  dextrose 50% Injectable 25 Gram(s) IV Push once  dextrose 50% Injectable 25 Gram(s) IV Push once  docusate sodium 100 milliGRAM(s) Oral three times a day  enoxaparin Injectable 40 milliGRAM(s) SubCutaneous daily  insulin glargine Injectable (LANTUS) 10 Unit(s) SubCutaneous every morning  insulin lispro (HumaLOG) corrective regimen sliding scale   SubCutaneous three times a day before meals  insulin lispro Injectable (HumaLOG) 3 Unit(s) SubCutaneous three times a day before meals  levothyroxine 75 MICROGram(s) Oral daily  midodrine 2.5 milliGRAM(s) Oral three times a day  polyethylene glycol 3350 17 Gram(s) Oral daily  pramipexole 0.125 milliGRAM(s) Oral every 6 hours  senna 2 Tablet(s) Oral at bedtime  sodium chloride 0.9%. 1000 milliLiter(s) (75 mL/Hr) IV Continuous <Continuous>    MEDICATIONS  (PRN):  dextrose 40% Gel 15 Gram(s) Oral once PRN Blood Glucose LESS THAN 70 milliGRAM(s)/deciliter  glucagon  Injectable 1 milliGRAM(s) IntraMuscular once PRN Glucose LESS THAN 70 milligrams/deciliter        RADIOLOGY & ADDITIONAL TESTS:
Patient is a 74y old  Male who presents with a chief complaint of Dizziness /Vertigo (21 Jul 2019 10:13)      INTERVAL HPI/OVERNIGHT EVENTS: noted, feels well      Vital Signs Last 24 Hrs  T(C): 36.4 (21 Jul 2019 12:05), Max: 36.6 (20 Jul 2019 21:03)  T(F): 97.5 (21 Jul 2019 12:05), Max: 97.8 (20 Jul 2019 21:03)  HR: 66 (21 Jul 2019 12:05) (66 - 73)  BP: 105/63 (21 Jul 2019 12:05) (105/63 - 147/78)  BP(mean): --  RR: 18 (21 Jul 2019 12:05) (18 - 18)  SpO2: 100% (21 Jul 2019 12:05) (98% - 100%)    allopurinol 300 milliGRAM(s) Oral daily  atorvastatin 80 milliGRAM(s) Oral at bedtime  carbidopa/levodopa  25/100 2 Tablet(s) Oral every 6 hours  clopidogrel Tablet 75 milliGRAM(s) Oral daily  cyanocobalamin 1000 MICROGram(s) Oral daily  dextrose 40% Gel 15 Gram(s) Oral once PRN  dextrose 5%. 1000 milliLiter(s) IV Continuous <Continuous>  dextrose 50% Injectable 12.5 Gram(s) IV Push once  dextrose 50% Injectable 25 Gram(s) IV Push once  dextrose 50% Injectable 25 Gram(s) IV Push once  docusate sodium 100 milliGRAM(s) Oral three times a day  enoxaparin Injectable 40 milliGRAM(s) SubCutaneous daily  glucagon  Injectable 1 milliGRAM(s) IntraMuscular once PRN  insulin glargine Injectable (LANTUS) 10 Unit(s) SubCutaneous every morning  insulin lispro (HumaLOG) corrective regimen sliding scale   SubCutaneous three times a day before meals  insulin lispro Injectable (HumaLOG) 3 Unit(s) SubCutaneous three times a day before meals  levothyroxine 75 MICROGram(s) Oral daily  polyethylene glycol 3350 17 Gram(s) Oral daily  pramipexole 0.125 milliGRAM(s) Oral every 6 hours  senna 2 Tablet(s) Oral at bedtime      PHYSICAL EXAM:  GENERAL: NAD,   EYES: conjunctiva and sclera clear  ENMT: Moist mucous membranes  NECK: Supple, No JVD, Normal thyroid  NERVOUS SYSTEM:  Alert & Oriented X,   CHEST/LUNG: Clear to auscultation bilaterally; No rales, rhonchi, wheezing, or rubs  HEART: Regular rate and rhythm; No murmurs, rubs, or gallops  ABDOMEN: Soft, Nontender, Nondistended; Bowel sounds present  EXTREMITIES:  2+ Peripheral Pulses, No clubbing, cyanosis, or edema  LYMPH: No lymphadenopathy noted  SKIN: No rashes or lesions    Consultant(s) Notes Reviewed:  [x ] YES  [ ] NO  Care Discussed with Consultants/Other Providers [ x] YES  [ ] NO    LABS:              CAPILLARY BLOOD GLUCOSE      POCT Blood Glucose.: 146 mg/dL (21 Jul 2019 16:32)  POCT Blood Glucose.: 247 mg/dL (21 Jul 2019 12:07)  POCT Blood Glucose.: 191 mg/dL (21 Jul 2019 07:55)  POCT Blood Glucose.: 173 mg/dL (20 Jul 2019 21:41)              RADIOLOGY & ADDITIONAL TESTS:    Imaging Personally Reviewed:  [x ] YES  [ ] NO

## 2019-07-24 NOTE — DISCHARGE NOTE NURSING/CASE MANAGEMENT/SOCIAL WORK - [email or web address]_[email or web address]_CORE
You can access the [facility] Patient Portal, offered by [facility], by registering with the following website: [email or web address]

## 2019-08-05 ENCOUNTER — MOBILE ON CALL (OUTPATIENT)
Age: 74
End: 2019-08-05

## 2019-08-06 ENCOUNTER — CLINICAL ADVICE (OUTPATIENT)
Age: 74
End: 2019-08-06

## 2019-08-15 ENCOUNTER — APPOINTMENT (OUTPATIENT)
Dept: NEPHROLOGY | Facility: CLINIC | Age: 74
End: 2019-08-15
Payer: MEDICARE

## 2019-08-15 ENCOUNTER — CLINICAL ADVICE (OUTPATIENT)
Age: 74
End: 2019-08-15

## 2019-08-15 VITALS
HEIGHT: 69 IN | WEIGHT: 162 LBS | HEART RATE: 83 BPM | DIASTOLIC BLOOD PRESSURE: 65 MMHG | BODY MASS INDEX: 23.99 KG/M2 | OXYGEN SATURATION: 100 % | SYSTOLIC BLOOD PRESSURE: 130 MMHG

## 2019-08-15 PROCEDURE — 99214 OFFICE O/P EST MOD 30 MIN: CPT

## 2019-08-15 NOTE — PHYSICAL EXAM
[General Appearance - Alert] : alert [General Appearance - In No Acute Distress] : in no acute distress [General Appearance - Well Nourished] : well nourished [Sclera] : the sclera and conjunctiva were normal [PERRL With Normal Accommodation] : pupils were equal in size, round, and reactive to light [Optic Disc Abnormality] : the optic disc were normal in size and color [Both Tympanic Membranes Were Examined] : both tympanic membranes were normal [Outer Ear] : the ears and nose were normal in appearance [Neck Appearance] : the appearance of the neck was normal [Jugular Venous Distention Increased] : there was no jugular-venous distention [Thyroid Diffuse Enlargement] : the thyroid was not enlarged [] : no respiratory distress [Respiration, Rhythm And Depth] : normal respiratory rhythm and effort [Auscultation Breath Sounds / Voice Sounds] : lungs were clear to auscultation bilaterally [Apical Impulse] : the apical impulse was normal [Heart Sounds Pericardial Friction Rub] : no pericardial rub [Heart Sounds] : normal S1 and S2 [Arterial Pulses Carotid] : carotid pulses were normal with no bruits [Bowel Sounds] : normal bowel sounds [Abdomen Soft] : soft [Abdomen Tenderness] : non-tender

## 2019-08-15 NOTE — HISTORY OF PRESENT ILLNESS
[FreeTextEntry1] : 67 year old man hx DM, Htn, nosebleeds, nephrolithiasis, CAD, stent referred for CKD and albuminuria.\par \par Pt has had DM for many years with geenrally good control. No use of NSAIDS, no heavy metals, toxins, no skin rash, no joint pains.\par \par grandson 5 years old with Htn\par Parkinsons dx in doubt\par Off prmaiprexole\par Dec Levo dopa\par No orthostsisis today

## 2019-08-15 NOTE — ADDENDUM
[FreeTextEntry1] : 67 year old man hx DM, Htn, nosebleeds, nephrolithiasis, CAD, stent referred for CKD and albuminuria.\par \par Pt has had DM for many years with geenrally good control. No use of NSAIDS, no heavy metals, toxins, no skin rash, no joint pains.\par \par Probable diabetic nephropathy. \par \par Had CVA now MI had cath with stent 5 d ago\par Now hd SBO- medically managed\par Came in with dizziness / low BP\par IN ER yesterday- new left 2x6 mm kidney stone\par took oxycodone and flomax\par BP low

## 2019-08-16 ENCOUNTER — RX RENEWAL (OUTPATIENT)
Age: 74
End: 2019-08-16

## 2019-10-24 ENCOUNTER — APPOINTMENT (OUTPATIENT)
Dept: ENDOCRINOLOGY | Facility: CLINIC | Age: 74
End: 2019-10-24
Payer: MEDICARE

## 2019-10-24 VITALS
WEIGHT: 162 LBS | HEART RATE: 75 BPM | HEIGHT: 69 IN | DIASTOLIC BLOOD PRESSURE: 70 MMHG | OXYGEN SATURATION: 98 % | SYSTOLIC BLOOD PRESSURE: 130 MMHG | BODY MASS INDEX: 23.99 KG/M2

## 2019-10-24 PROCEDURE — 99214 OFFICE O/P EST MOD 30 MIN: CPT

## 2019-10-24 RX ORDER — PRAMIPEXOLE DIHYDROCHLORIDE 0.12 MG/1
0.12 TABLET ORAL
Refills: 0 | Status: DISCONTINUED | COMMUNITY
Start: 2019-06-11 | End: 2019-10-24

## 2019-10-24 RX ORDER — BLOOD-GLUCOSE METER
W/DEVICE EACH MISCELLANEOUS
Qty: 1 | Refills: 0 | Status: DISCONTINUED | COMMUNITY
Start: 2019-08-15 | End: 2019-10-24

## 2019-10-24 RX ORDER — CARBIDOPA AND LEVODOPA 25; 100 MG/1; MG/1
TABLET ORAL
Refills: 0 | Status: DISCONTINUED | COMMUNITY
End: 2019-10-24

## 2019-10-24 NOTE — ASSESSMENT
[Carbohydrate Consistent Diet] : carbohydrate consistent diet [Hypoglycemia Management] : hypoglycemia management [Importance of Diet and Exercise] : importance of diet and exercise to improve glycemic control, achieve weight loss and improve cardiovascular health [FreeTextEntry1] : 75 y/o male with \par \par 1. T2DM: Markedly elevated after discharge from hospital for orthostatic hypotension. Insulin doses increased to Lantus 30, pre-meal NovoLog 7 at breakfast for lunch 7 dinner. Blood sugar is have generally improved although still markedly elevated after breakfast. Patient often has a high carbohydrate breakfast such as a bagel. No major hypoglycemic events.\par Goals of diabetes care reviewed. Looser targets of hemoglobin A1c in older patients reviewed. On the other hand I would prefer to maintain blood sugars less than 180. The concept of time in target was reviewed with patient and spouse\par Recommend increase pre-breakfast insulin to 9 units continue lunch at 4 and dinner at 7. Patient to call if blood sugar is either do not achieve targets are patient develops pre-lunch hypoglycemia. Continue Lantus 30\par     Pt on 7/4/7 NovoLog and 30 Lantus now.\par \par \par F/u in 3 mons.

## 2019-10-24 NOTE — END OF VISIT
[FreeTextEntry3] : I, Leon Nicole, authored this note working as a medical scribe for Dr. Correia.  10/24/2019.  4:30PM. This note was authored by the medical scribe for me. I have reviewed, edited, and revised the note as needed. I am in agreement with the exam findings, imaging findings, and treatment plan.  Dale Correia MD

## 2019-10-24 NOTE — PHYSICAL EXAM
[Alert] : alert [No Acute Distress] : no acute distress [Well Nourished] : well nourished [Well Developed] : well developed [Normal Sclera/Conjunctiva] : normal sclera/conjunctiva [No Proptosis] : no proptosis [Normal Oropharynx] : the oropharynx was normal [Thyroid Not Enlarged] : the thyroid was not enlarged [No Thyroid Nodules] : there were no palpable thyroid nodules [No Respiratory Distress] : no respiratory distress [No Accessory Muscle Use] : no accessory muscle use [Clear to Auscultation] : lungs were clear to auscultation bilaterally [Normal Rate] : heart rate was normal  [Normal S1, S2] : normal S1 and S2 [Regular Rhythm] : with a regular rhythm [Normal Bowel Sounds] : normal bowel sounds [Not Tender] : non-tender [Soft] : abdomen soft [Not Distended] : not distended [No Stigmata of Cushings Syndrome] : no stigmata of cushings syndrome [Normal Gait] : normal gait [No Rash] : no rash [2+] : 2+ in the dorsalis pedis [Oriented x3] : oriented to person, place, and time [No Tremors] : no tremors [de-identified] : central obesity

## 2019-10-24 NOTE — HISTORY OF PRESENT ILLNESS
[FreeTextEntry1] : F/u 75 y/o male with type 2 DM, renal insuff.\par \par Pt was previously checking SMBG 4x/day. On Lantus 28 units daily, Was on NovoLog 5/3/5. BS by Alondra well controlled, single hypo only. Previously saw Dr. Toussaint from renal for diabetic nephropathy. Prior Cre: 1.99 and 2.3, was allergic to ACE, unclear if ever on ARB. H/o uric acid kidney stones on Allopurinol. Previously saw podiatry. Last optho <6 mons. \par Was adm for orthostatic hypotension 7/2019, stopped anti-HTN Rx. Neuro stopped Parkinson's rx, dx not clear.\par BS were high post discharge, often   300s, .Insulin increase to  7/4/7 NovoLog and 30 Lantus now. Alondra show mornings running high and evening stable. A1c   8.1  early Oct. \par \par Dx with prostate CA in 2018. Began RT May 201

## 2019-10-24 NOTE — REVIEW OF SYSTEMS
[Myalgia] : myalgia  [Back Pain] : back pain [Cold Intolerance] : cold intolerant [Negative] : Heme/Lymph [All other systems negative] : All other systems negative [Chest Pain] : no chest pain [Shortness Of Breath] : no shortness of breath [Nausea] : no nausea [Vomiting] : no vomiting was observed [de-identified] : Parkinsons

## 2019-11-18 NOTE — ED PROVIDER NOTE - DISCHARGE DATE
[facility] Emergency Services  [address] [facility] [address]  Phone: [phone]   November 18, 2019    Patient: Ange Pandya   YOB: 1957   Date of Visit: 11/18/2019       To Whom It May Concern:    Ange Pandya was seen and treated in our emergency department on 11/18/2019. She may return to work on 11/21/2019.    If you have any questions or concerns, please don't hesitate to call.    Sincerely,         Daniel Jones PA-C     
04-Jul-2019

## 2019-12-05 ENCOUNTER — OTHER (OUTPATIENT)
Age: 74
End: 2019-12-05

## 2020-01-16 ENCOUNTER — APPOINTMENT (OUTPATIENT)
Dept: ENDOCRINOLOGY | Facility: CLINIC | Age: 75
End: 2020-01-16
Payer: MEDICARE

## 2020-01-16 VITALS
SYSTOLIC BLOOD PRESSURE: 134 MMHG | DIASTOLIC BLOOD PRESSURE: 74 MMHG | OXYGEN SATURATION: 98 % | HEART RATE: 77 BPM | BODY MASS INDEX: 24.29 KG/M2 | WEIGHT: 164 LBS | HEIGHT: 69 IN

## 2020-01-16 LAB — HBA1C MFR BLD HPLC: 6.4

## 2020-01-16 PROCEDURE — 83036 HEMOGLOBIN GLYCOSYLATED A1C: CPT | Mod: QW

## 2020-01-16 PROCEDURE — 99214 OFFICE O/P EST MOD 30 MIN: CPT | Mod: 25

## 2020-01-16 RX ORDER — ATENOLOL 25 MG/1
25 TABLET ORAL
Qty: 90 | Refills: 0 | Status: COMPLETED | COMMUNITY
Start: 2019-07-17

## 2020-01-16 RX ORDER — TRIAMCINOLONE ACETONIDE 1 MG/G
0.1 OINTMENT TOPICAL
Qty: 80 | Refills: 0 | Status: COMPLETED | COMMUNITY
Start: 2019-12-30

## 2020-01-16 NOTE — ASSESSMENT
[Hypoglycemia Management] : hypoglycemia management [Carbohydrate Consistent Diet] : carbohydrate consistent diet [Importance of Diet and Exercise] : importance of diet and exercise to improve glycemic control, achieve weight loss and improve cardiovascular health [FreeTextEntry1] : 75 y/o male with:\par \par 1. T2DM: Markedly elevated after discharge from hospital for orthostatic hypotension. Was on Lantus 30, pre-meal NovoLog 7 at breakfast for lunch 7 dinner. Blood sugar is have generally improved although still markedly elevated after breakfast. Patient often has a high carbohydrate breakfast such as a bagel. No major hypoglycemic events. Pt currently on NovoLog 8/4/7 and Lantus 30. Pt's current A1c 6.4. Continue NovoLog and Lantus. \par \par Goals of diabetes care was reviewed previously. Looser targets of hemoglobin A1c in older patients reviewed. On the other hand I would prefer to maintain blood sugars less than 180. The concept of time in target was reviewed with patient and spouse.\par 2. Hypothryoidism. Clinically euthyroid on LT4 75.\par 3. hyperlipidemia. on atorvastatin. LDL 37\par F/u in 3 mons.

## 2020-01-16 NOTE — REVIEW OF SYSTEMS
[Myalgia] : myalgia  [Back Pain] : back pain [Cold Intolerance] : cold intolerant [Negative] : Heme/Lymph [All other systems negative] : All other systems negative [Chest Pain] : no chest pain [Shortness Of Breath] : no shortness of breath [Nausea] : no nausea [Vomiting] : no vomiting was observed [de-identified] : Parkinsons

## 2020-01-16 NOTE — END OF VISIT
[FreeTextEntry3] : I, Leon Nicole, authored this note working as a medical scribe for Dr. Correia.  01/16/2020.  3:30PM.  This note was authored by the medical scribe for me. I have reviewed, edited, and revised the note as needed. I am in agreement with the exam findings, imaging findings, and treatment plan.  Dale Correia MD

## 2020-01-16 NOTE — PHYSICAL EXAM
[No Acute Distress] : no acute distress [Alert] : alert [Well Developed] : well developed [Well Nourished] : well nourished [Normal Sclera/Conjunctiva] : normal sclera/conjunctiva [No Proptosis] : no proptosis [Normal Oropharynx] : the oropharynx was normal [Thyroid Not Enlarged] : the thyroid was not enlarged [No Thyroid Nodules] : there were no palpable thyroid nodules [No Accessory Muscle Use] : no accessory muscle use [No Respiratory Distress] : no respiratory distress [Clear to Auscultation] : lungs were clear to auscultation bilaterally [Normal Rate] : heart rate was normal  [Normal S1, S2] : normal S1 and S2 [Regular Rhythm] : with a regular rhythm [Not Tender] : non-tender [Normal Bowel Sounds] : normal bowel sounds [Not Distended] : not distended [Soft] : abdomen soft [Normal Gait] : normal gait [No Stigmata of Cushings Syndrome] : no stigmata of cushings syndrome [No Tremors] : no tremors [No Rash] : no rash [Oriented x3] : oriented to person, place, and time [de-identified] : central obesity

## 2020-01-16 NOTE — HISTORY OF PRESENT ILLNESS
[FreeTextEntry1] : F/u 75 y/o male with type 2 DM, renal insuff.\par \par Pt was previously checking SMBG 4x/day. On Lantus 28 units daily, Was on NovoLog 5/3/5. BS by Alondra well controlled, single hypo only. Previously saw Dr. Toussaint from renal for diabetic nephropathy. Prior Cre: 1.99 and 2.3, was allergic to ACE, unclear if ever on ARB. H/o uric acid kidney stones on Allopurinol. Previously saw podiatry. Last optho <6 mons. \par \par Was adm for orthostatic hypotension 7/2019, stopped anti-HTN Rx. Neuro stopped Parkinson's rx, dx not clear. BS were high post discharge, often 300s. Was on 7/4/7 NovoLog and 30 Lantus now. Currently on NovaLog to 8/4/7. Alondra show stable BGL with few hypers. A1c 8.1 Oct 2019. Pt states he started getting rash on his arm while wearing on the Alondra. Current A1c 6.4%, stable.\par \par Pt states he recently started [employer] as a form of exercise. \par \par Dx with prostate CA in 2018. Began RT May 2019.

## 2020-02-06 ENCOUNTER — APPOINTMENT (OUTPATIENT)
Dept: NEUROLOGY | Facility: CLINIC | Age: 75
End: 2020-02-06

## 2020-02-13 ENCOUNTER — APPOINTMENT (OUTPATIENT)
Dept: NEPHROLOGY | Facility: CLINIC | Age: 75
End: 2020-02-13
Payer: MEDICARE

## 2020-02-13 VITALS
DIASTOLIC BLOOD PRESSURE: 70 MMHG | HEART RATE: 74 BPM | OXYGEN SATURATION: 100 % | BODY MASS INDEX: 24.07 KG/M2 | SYSTOLIC BLOOD PRESSURE: 137 MMHG | WEIGHT: 163 LBS

## 2020-02-13 PROCEDURE — 99213 OFFICE O/P EST LOW 20 MIN: CPT

## 2020-02-13 NOTE — PHYSICAL EXAM
[General Appearance - Alert] : alert [General Appearance - In No Acute Distress] : in no acute distress [General Appearance - Well Nourished] : well nourished [Sclera] : the sclera and conjunctiva were normal [PERRL With Normal Accommodation] : pupils were equal in size, round, and reactive to light [Optic Disc Abnormality] : the optic disc were normal in size and color [Outer Ear] : the ears and nose were normal in appearance [Both Tympanic Membranes Were Examined] : both tympanic membranes were normal [Neck Appearance] : the appearance of the neck was normal [Jugular Venous Distention Increased] : there was no jugular-venous distention [Thyroid Diffuse Enlargement] : the thyroid was not enlarged [] : no respiratory distress [Respiration, Rhythm And Depth] : normal respiratory rhythm and effort [Auscultation Breath Sounds / Voice Sounds] : lungs were clear to auscultation bilaterally [Apical Impulse] : the apical impulse was normal [Heart Sounds] : normal S1 and S2 [Heart Sounds Pericardial Friction Rub] : no pericardial rub [Arterial Pulses Carotid] : carotid pulses were normal with no bruits [Bowel Sounds] : normal bowel sounds [Abdomen Soft] : soft [Abdomen Tenderness] : non-tender

## 2020-02-13 NOTE — HISTORY OF PRESENT ILLNESS
[FreeTextEntry1] : 67 year old man hx DM, Htn, nosebleeds, nephrolithiasis, CAD, stent referred for CKD and albuminuria.\par \par Pt has had DM for many years with geenrally good control. No use of NSAIDS, no heavy metals, toxins, no skin rash, no joint pains.\par \par Feels well\par No recent complaints nl stress test last week\par SCt stable, good BP\par check labs today

## 2020-02-13 NOTE — ASSESSMENT
[FreeTextEntry1] : 67 year old man hx DM, Htn, nosebleeds, nephrolithiasis, CAD, stent referred for CKD and albuminuria.\par Pt has had DM for many years with geenrally good control. No use of NSAIDS, no heavy metals, toxins, no skin rash, no joint pains.\par Probable diabetic nephropathy. \par \par Parkinsons dx in doubt\par Off prmaiprexole\par Dec Levo dopa\par No orthostsisis today\par Bu two falls over summer, coming off midodrine\par

## 2020-02-14 LAB
ALBUMIN SERPL ELPH-MCNC: 4.2 G/DL
ALP BLD-CCNC: 141 U/L
ALT SERPL-CCNC: 13 U/L
ANION GAP SERPL CALC-SCNC: 14 MMOL/L
AST SERPL-CCNC: 16 U/L
BASOPHILS # BLD AUTO: 0.04 K/UL
BASOPHILS NFR BLD AUTO: 0.5 %
BILIRUB SERPL-MCNC: 0.4 MG/DL
BUN SERPL-MCNC: 41 MG/DL
CALCIUM SERPL-MCNC: 9.3 MG/DL
CHLORIDE SERPL-SCNC: 104 MMOL/L
CO2 SERPL-SCNC: 22 MMOL/L
CREAT SERPL-MCNC: 2.19 MG/DL
EOSINOPHIL # BLD AUTO: 0.2 K/UL
EOSINOPHIL NFR BLD AUTO: 2.6 %
GLUCOSE SERPL-MCNC: 131 MG/DL
HCT VFR BLD CALC: 36.5 %
HGB BLD-MCNC: 11.7 G/DL
IMM GRANULOCYTES NFR BLD AUTO: 0.4 %
LYMPHOCYTES # BLD AUTO: 0.98 K/UL
LYMPHOCYTES NFR BLD AUTO: 12.5 %
MAN DIFF?: NORMAL
MCHC RBC-ENTMCNC: 28.8 PG
MCHC RBC-ENTMCNC: 32.1 GM/DL
MCV RBC AUTO: 89.9 FL
MONOCYTES # BLD AUTO: 0.5 K/UL
MONOCYTES NFR BLD AUTO: 6.4 %
NEUTROPHILS # BLD AUTO: 6.07 K/UL
NEUTROPHILS NFR BLD AUTO: 77.6 %
PLATELET # BLD AUTO: 209 K/UL
POTASSIUM SERPL-SCNC: 5 MMOL/L
PROT SERPL-MCNC: 6.7 G/DL
RBC # BLD: 4.06 M/UL
RBC # FLD: 14.4 %
SODIUM SERPL-SCNC: 141 MMOL/L
WBC # FLD AUTO: 7.82 K/UL

## 2020-05-20 ENCOUNTER — APPOINTMENT (OUTPATIENT)
Dept: ENDOCRINOLOGY | Facility: CLINIC | Age: 75
End: 2020-05-20
Payer: MEDICARE

## 2020-05-20 PROCEDURE — 99443: CPT | Mod: 95

## 2020-05-20 RX ORDER — NEOMYCIN SULFATE AND POLYMYXIN B SULFATE, BACITRACIN ZINC AND HYDROCORTISONE 3.5; 10000; 400; 1 MG/G; [USP'U]/G; [USP'U]/G; MG/G
1 OINTMENT OPHTHALMIC
Qty: 4 | Refills: 0 | Status: COMPLETED | COMMUNITY
Start: 2020-02-14

## 2020-05-20 RX ORDER — TOBRAMYCIN AND DEXAMETHASONE 3; 1 MG/ML; MG/ML
0.3-0.1 SUSPENSION/ DROPS OPHTHALMIC
Qty: 5 | Refills: 0 | Status: COMPLETED | COMMUNITY
Start: 2020-02-18

## 2020-05-20 NOTE — REVIEW OF SYSTEMS
[Myalgia] : myalgia  [Back Pain] : back pain [Cold Intolerance] : cold intolerant [Negative] : Heme/Lymph [All other systems negative] : All other systems negative [Chest Pain] : no chest pain [Shortness Of Breath] : no shortness of breath [Vomiting] : no vomiting was observed [Nausea] : no nausea [de-identified] : Parkinsons

## 2020-05-20 NOTE — HISTORY OF PRESENT ILLNESS
[Home] : at home, [unfilled] , at the time of the visit. [Medical Office: ([facility])___] : at the medical office located in  [Spouse] : spouse [FreeTextEntry1] : F/u  type 2 DM, renal insuff.\par Type 2 DM: Insulin doses decreased 3/24/20 for a hypo : Lantus 22, Novolog 6-2-3. BS appear well controlled by FS, mostly low 100s am and lunch, more variable ~dinner. No further hypos.  Patient denies any polyuria or polydipsia.\par  \par  Previously saw Dr. Toussaint from renal for diabetic nephropathy.  last  Cre: 2.19 , was allergic to ACE, unclear if ever on ARB. H/o uric acid kidney stones on Allopurinol. Previously saw podiatry. Last optho <6 mons. \par Hypothyroidism : denies sx of hypo or hyperthyroidism. on LT4 75\par Was adm for orthostatic hypotension 7/2019, stopped anti-HTN Rx. added midodrine\par Neuro stopped Parkinson's rx, dx not clear. \par \par Dx with prostate CA in 2018. Began RT May 2019.

## 2020-05-20 NOTE — ASSESSMENT
[Carbohydrate Consistent Diet] : carbohydrate consistent diet [Hypoglycemia Management] : hypoglycemia management [Importance of Diet and Exercise] : importance of diet and exercise to improve glycemic control, achieve weight loss and improve cardiovascular health [FreeTextEntry1] : 73 y/o male with:\par \par 1. T2DM: Pt appears to be reasonably controlled on decreases NovoLog 6/2/3 and Lantus 22  Pt's last t A1c 6.4. Continue NovoLog and Lantus. \par \par Goals of diabetes care was reviewed previously. Looser targets of hemoglobin A1c in older patients reviewed. On the other hand I would prefer to maintain blood sugars less than 180. The concept of time in target was reviewed with patient and spouse.\par Treatment of Diabetes in Older Adults: An Endocrine Society* Clinical Practice Guideline Tom Milan  et al .  The Journal of Clinical Endocrinology & Metabolism, Volume 104, Issue 5, May 2019, Pages 1529–1578, \par 2. Hypothryoidism. Clinically euthyroid on LT4 75.\par 3. hyperlipidemia. on atorvastatin. LDL 37\par \par sched for medical micah't July - can get labs then \par F/u in 3 mons.

## 2020-05-20 NOTE — REASON FOR VISIT
[Follow-Up: _____] : a [unfilled] follow-up visit [Spouse] : spouse [Follow - Up] : a follow-up visit [DM Type 2] : DM Type 2

## 2020-07-15 NOTE — PHYSICAL THERAPY INITIAL EVALUATION ADULT - SITTING BALANCE: DYNAMIC
July 24, 2020      Juan Manuel Reyes  [address]  [place] [address]        Dear Parent or Guardian of Juan Manuel Reyes    We are writing to inform you of your child's test results.    Labs are ok    vitamin D is low    Will discusses this  [facility]     Resulted Orders   Hemoglobin A1c ([facility])   Result Value Ref Range    Hemoglobin A1C 5.1 4.1 - 5.7 %   Lipid Panel ([facility])   Result Value Ref Range    Cholesterol 107.0 mg/dL    Cholesterol/HDL Ratio 3.4 0.0 - 5.0    HDL Cholesterol 31.2 mg/dL    LDL Cholesterol Calculated 46 mg/dL    Triglycerides 149.6 mg/dL    VLDL Cholesterol 29.9 mg/dL   Vitamin D 25-Hydroxy ([facility])   Result Value Ref Range    Vitamin D,25-Hydroxy 17.0 (L) 30.0 - 80.0 ng/mL    Narrative    Test performed by:  [facility]  [address] [place] [address]  Deficiency <10.0 ng/mL  Insufficiency 10.0-29.9 ng/mL  Sufficiency 30.0-80.0 ng/mL  Toxicity (possible) >100.0 ng/mL   Thyroid [place] ([facility])   Result Value Ref Range    TSH 1.90 0.30 - 5.00 uIU/mL    Narrative    Test performed by:  [facility]  [address] [place] [address]   Basic Metabolic Panel ([facility])   Result Value Ref Range    Urea Nitrogen 11.2 7.0 - 21.0 mg/dL    Calcium 10.3 9.1 - 10.3 mg/dL    Chloride 102.8 94.0 - 109.0 mmol/L    Carbon Dioxide 27.0 20.0 - 32.0 mmol/L    Creatinine 0.8 0.5 - 1.0 mg/dL    Glucose 91.5 70.0 - 99.0 mg'dL    Potassium 4.8 (H) 3.2 - 4.6 mmol/L    Sodium 139.6 132.0 - 142.0 mmol/L    GFR Estimate >90 >60.0 mL/min/1.7 m2    GFR Estimate If Black >90 >60.0 mL/min/1.7 m2       If you have any questions or concerns, please call the clinic at the number listed above.       Sincerely,        Alex Evans MD                
good balance

## 2020-07-31 ENCOUNTER — RX RENEWAL (OUTPATIENT)
Age: 75
End: 2020-07-31

## 2020-08-17 ENCOUNTER — APPOINTMENT (OUTPATIENT)
Dept: NEPHROLOGY | Facility: CLINIC | Age: 75
End: 2020-08-17
Payer: MEDICARE

## 2020-08-17 VITALS
HEART RATE: 72 BPM | SYSTOLIC BLOOD PRESSURE: 146 MMHG | WEIGHT: 150 LBS | DIASTOLIC BLOOD PRESSURE: 82 MMHG | OXYGEN SATURATION: 100 % | BODY MASS INDEX: 22.15 KG/M2

## 2020-08-17 PROCEDURE — 99213 OFFICE O/P EST LOW 20 MIN: CPT

## 2020-08-17 NOTE — PHYSICAL EXAM
[General Appearance - Alert] : alert [General Appearance - In No Acute Distress] : in no acute distress [General Appearance - Well Nourished] : well nourished [PERRL With Normal Accommodation] : pupils were equal in size, round, and reactive to light [Sclera] : the sclera and conjunctiva were normal [Optic Disc Abnormality] : the optic disc were normal in size and color [Outer Ear] : the ears and nose were normal in appearance [Both Tympanic Membranes Were Examined] : both tympanic membranes were normal [Jugular Venous Distention Increased] : there was no jugular-venous distention [Thyroid Diffuse Enlargement] : the thyroid was not enlarged [Neck Appearance] : the appearance of the neck was normal [] : no respiratory distress [Apical Impulse] : the apical impulse was normal [Respiration, Rhythm And Depth] : normal respiratory rhythm and effort [Auscultation Breath Sounds / Voice Sounds] : lungs were clear to auscultation bilaterally [Arterial Pulses Carotid] : carotid pulses were normal with no bruits [Heart Sounds Pericardial Friction Rub] : no pericardial rub [Heart Sounds] : normal S1 and S2 [Abdomen Soft] : soft [Bowel Sounds] : normal bowel sounds [Abdomen Tenderness] : non-tender

## 2020-08-17 NOTE — ASSESSMENT
[FreeTextEntry1] : 67 year old man hx DM, Htn, nosebleeds, nephrolithiasis, CAD, stent referred for CKD and albuminuria.\par Pt has had DM for many years with geenrally good control. No use of NSAIDS, no heavy metals, toxins, no skin rash, no joint pains.\par Probable diabetic nephropathy. \par \par Feels well\par But losing weight\par eating well\par no change in how much eating\par No new sxs\par Parkinsons causing him to seem slower, harder moving\par

## 2020-08-17 NOTE — HISTORY OF PRESENT ILLNESS
[FreeTextEntry1] : 67 year old man hx DM, Htn, nosebleeds, nephrolithiasis, CAD, stent referred for CKD and albuminuria.\par \par Pt has had DM for many years with geenrally good control. No use of NSAIDS, no heavy metals, toxins, no skin rash, no joint pains.\par \par Feels well\par But losing weight\par eating well\par no change in how much eating

## 2020-08-18 LAB
ALBUMIN SERPL ELPH-MCNC: 4.6 G/DL
ALP BLD-CCNC: 136 U/L
ALT SERPL-CCNC: <5 U/L
ANION GAP SERPL CALC-SCNC: 10 MMOL/L
AST SERPL-CCNC: 10 U/L
BASOPHILS # BLD AUTO: 0.06 K/UL
BASOPHILS NFR BLD AUTO: 0.7 %
BILIRUB SERPL-MCNC: 0.5 MG/DL
BUN SERPL-MCNC: 40 MG/DL
CALCIUM SERPL-MCNC: 9.4 MG/DL
CHLORIDE SERPL-SCNC: 105 MMOL/L
CO2 SERPL-SCNC: 28 MMOL/L
CREAT SERPL-MCNC: 1.96 MG/DL
DEPRECATED KAPPA LC FREE/LAMBDA SER: 1.85 RATIO
EOSINOPHIL # BLD AUTO: 0.32 K/UL
EOSINOPHIL NFR BLD AUTO: 3.7 %
FERRITIN SERPL-MCNC: 335 NG/ML
GLUCOSE SERPL-MCNC: 107 MG/DL
HCT VFR BLD CALC: 35.4 %
HGB BLD-MCNC: 11.4 G/DL
IMM GRANULOCYTES NFR BLD AUTO: 0.3 %
IRON SATN MFR SERPL: 22 %
IRON SERPL-MCNC: 48 UG/DL
KAPPA LC CSF-MCNC: 4.85 MG/DL
KAPPA LC SERPL-MCNC: 8.98 MG/DL
LYMPHOCYTES # BLD AUTO: 1.06 K/UL
LYMPHOCYTES NFR BLD AUTO: 12.3 %
MAN DIFF?: NORMAL
MCHC RBC-ENTMCNC: 29.6 PG
MCHC RBC-ENTMCNC: 32.2 GM/DL
MCV RBC AUTO: 91.9 FL
MONOCYTES # BLD AUTO: 0.63 K/UL
MONOCYTES NFR BLD AUTO: 7.3 %
NEUTROPHILS # BLD AUTO: 6.52 K/UL
NEUTROPHILS NFR BLD AUTO: 75.7 %
PLATELET # BLD AUTO: 163 K/UL
POTASSIUM SERPL-SCNC: 4.5 MMOL/L
PROT SERPL-MCNC: 6.9 G/DL
RBC # BLD: 3.85 M/UL
RBC # FLD: 14.8 %
SODIUM SERPL-SCNC: 142 MMOL/L
TIBC SERPL-MCNC: 214 UG/DL
TSH SERPL-ACNC: 1.69 UIU/ML
UIBC SERPL-MCNC: 166 UG/DL
VIT B12 SERPL-MCNC: 1468 PG/ML
WBC # FLD AUTO: 8.62 K/UL

## 2020-08-19 LAB — M PROTEIN SPEC IFE-MCNC: NORMAL

## 2020-09-21 RX ORDER — BLOOD SUGAR DIAGNOSTIC
STRIP MISCELLANEOUS
Qty: 2 | Refills: 2 | Status: ACTIVE | COMMUNITY
Start: 2020-09-21 | End: 1900-01-01

## 2020-09-21 RX ORDER — BLOOD-GLUCOSE METER
W/DEVICE EACH MISCELLANEOUS
Qty: 1 | Refills: 0 | Status: ACTIVE | COMMUNITY
Start: 2020-09-21 | End: 1900-01-01

## 2020-11-23 ENCOUNTER — RX RENEWAL (OUTPATIENT)
Age: 75
End: 2020-11-23

## 2020-12-03 ENCOUNTER — APPOINTMENT (OUTPATIENT)
Dept: NEPHROLOGY | Facility: CLINIC | Age: 75
End: 2020-12-03

## 2020-12-03 ENCOUNTER — APPOINTMENT (OUTPATIENT)
Dept: NEPHROLOGY | Facility: CLINIC | Age: 75
End: 2020-12-03
Payer: MEDICARE

## 2020-12-03 PROCEDURE — 99213 OFFICE O/P EST LOW 20 MIN: CPT | Mod: 95

## 2020-12-03 NOTE — ASSESSMENT
[FreeTextEntry1] : 67 year old man hx DM, Htn, nosebleeds, nephrolithiasis, CAD, stent referred for CKD and albuminuria.\par Pt has had DM for many years with geenrally good control. No use of NSAIDS, no heavy metals, toxins, no skin rash, no joint pains.\par Probable diabetic nephropathy. \par \par But losing weight\par Staying home bc of COVID\par No recent problems\par US w renal cycts\par No uremic sxs\par Labs mid January\par Appt 2/2021\par 30 min telehealth\par

## 2020-12-03 NOTE — HISTORY OF PRESENT ILLNESS
[Home] : at home, [unfilled] , at the time of the visit. [Medical Office: ([facility])___] : at the medical office located in  [FreeTextEntry1] : 67 year old man hx DM, Htn, nosebleeds, nephrolithiasis, CAD, stent referred for CKD and albuminuria.\par \par Pt has had DM for many years with geenrally good control. No use of NSAIDS, no heavy metals, toxins, no skin rash, no joint pains.\par \par Feels well\par But losing weight\par Staying home bc of COVID\par No recent problems\par US w renal cycts

## 2020-12-04 ENCOUNTER — APPOINTMENT (OUTPATIENT)
Dept: ENDOCRINOLOGY | Facility: CLINIC | Age: 75
End: 2020-12-04
Payer: MEDICARE

## 2020-12-04 VITALS
HEIGHT: 69 IN | HEART RATE: 89 BPM | DIASTOLIC BLOOD PRESSURE: 72 MMHG | BODY MASS INDEX: 22.22 KG/M2 | OXYGEN SATURATION: 96 % | SYSTOLIC BLOOD PRESSURE: 130 MMHG | WEIGHT: 150 LBS | TEMPERATURE: 96.2 F

## 2020-12-04 LAB — HBA1C MFR BLD HPLC: 6.4

## 2020-12-04 PROCEDURE — 99214 OFFICE O/P EST MOD 30 MIN: CPT | Mod: 25

## 2020-12-04 PROCEDURE — 83036 HEMOGLOBIN GLYCOSYLATED A1C: CPT | Mod: QW

## 2020-12-06 NOTE — HISTORY OF PRESENT ILLNESS
[Home] : at home, [unfilled] , at the time of the visit. [Medical Office: ([facility])___] : at the medical office located in  [Spouse] : spouse [FreeTextEntry1] : F/u  type 2 DM, renal insuff.\par Type 2 DM: Insulin doses decreased 3/24/20 for a hypo : Lantus 22, Novolog 6-2-3. BS appear well controlled by FS, mostly low 100s am and lunch, more variable ~dinner. No further hypos.  Patient denies any polyuria or polydipsia.\par  \par  Previously saw Dr. Toussaint from renal for diabetic nephropathy.  last  Cre: 2.19 , was allergic to ACE, unclear if ever on ARB. H/o uric acid kidney stones on Allopurinol.\par Renal US 2.4 cm R renal mass, ordered MRI to r/o carcinoma\par Saw podiatry within 3 months. . Last ophtho <6 mons. \par Hypothyroidism : denies sx of hypo or hyperthyroidism. on LT4 75\par Was adm for orthostatic hypotension 7/2019, stopped anti-HTN Rx. added midodrine\par Neuro stopped Parkinson's rx, dx not clear. \par \par Dx with prostate CA in 2018. Began RT May 2019.

## 2020-12-06 NOTE — QUALITY MEASURES
[Nephrology Follow-Up] : patient is currently receiving treatment via nephrology follow-up [NoLowerExtremityNeuroExam] : Pt recently had podiatry exam

## 2020-12-06 NOTE — PHYSICAL EXAM
[Alert] : alert [Well Nourished] : well nourished [No Acute Distress] : no acute distress [Well Developed] : well developed [Normal Sclera/Conjunctiva] : normal sclera/conjunctiva [EOMI] : extra ocular movement intact [No Proptosis] : no proptosis [Normal Oropharynx] : the oropharynx was normal [Thyroid Not Enlarged] : the thyroid was not enlarged [No Thyroid Nodules] : no palpable thyroid nodules [Clear to Auscultation] : lungs were clear to auscultation bilaterally [Normal S1, S2] : normal S1 and S2 [Normal Rate] : heart rate was normal [Regular Rhythm] : with a regular rhythm [No Edema] : no peripheral edema [Normal Bowel Sounds] : normal bowel sounds [Not Tender] : non-tender [Not Distended] : not distended [Soft] : abdomen soft [Normal Anterior Cervical Nodes] : no anterior cervical lymphadenopathy [Normal Posterior Cervical Nodes] : no posterior cervical lymphadenopathy [No Spinal Tenderness] : no spinal tenderness [Spine Straight] : spine straight [No Stigmata of Cushings Syndrome] : no stigmata of Cushings Syndrome [Normal Gait] : normal gait [Normal Strength/Tone] : muscle strength and tone were normal [No Rash] : no rash [Normal Reflexes] : deep tendon reflexes were 2+ and symmetric [No Tremors] : no tremors [Oriented x3] : oriented to person, place, and time [Acanthosis Nigricans] : no acanthosis nigricans [de-identified] : foot exam deferred, recently saw podiatry

## 2020-12-06 NOTE — ASSESSMENT
[Carbohydrate Consistent Diet] : carbohydrate consistent diet [Hypoglycemia Management] : hypoglycemia management [Importance of Diet and Exercise] : importance of diet and exercise to improve glycemic control, achieve weight loss and improve cardiovascular health [FreeTextEntry1] : 74 y/o male with:\par \par 1. T2DM: Pt appears to be reasonably controlled on decreases NovoLog 6/2/3 and Lantus 22  Pt's last t A1c 6.4. Continue NovoLog and decrease Lantus. to 20 units to reduce risk of hypoglycemia increased age based on renal status, age\par \par Goals of diabetes care was reviewed previously. Looser targets of hemoglobin A1c in older patients reviewed. On the other hand I would prefer to maintain blood sugars less than 180. The concept of time in target was reviewed with patient and spouse.\par Treatment of Diabetes in Older Adults: An Endocrine Society* Clinical Practice Guideline Tom Milan  et al .  The Journal of Clinical Endocrinology & Metabolism, Volume 104, Issue 5, May 2019, Pages 1525–1578, \par 2. Hypothryoidism. Clinically euthyroid on LT4 75.\par 3. hyperlipidemia. on atorvastatin. LDL 37\par \par sched for medical micah't July - can get labs then \par F/u in 3 mons.

## 2021-01-28 ENCOUNTER — APPOINTMENT (OUTPATIENT)
Dept: NEPHROLOGY | Facility: CLINIC | Age: 76
End: 2021-01-28
Payer: MEDICARE

## 2021-01-28 PROCEDURE — 99214 OFFICE O/P EST MOD 30 MIN: CPT | Mod: 95

## 2021-01-29 NOTE — HISTORY OF PRESENT ILLNESS
[FreeTextEntry1] : 67 year old man hx DM, Htn, nosebleeds, nephrolithiasis, CAD, stent referred for CKD and albuminuria.\par \par Pt has had DM for many years with geenrally good control. No use of NSAIDS, no heavy metals, toxins, no skin rash, no joint pains.\par \par Staying home bc of COVID\par Had vaccine\par Doing well

## 2021-01-29 NOTE — ASSESSMENT
[FreeTextEntry1] : 67 year old man hx DM, Htn, nosebleeds, nephrolithiasis, CAD, stent referred for CKD and albuminuria.\par Pt has had DM for many years with geenrally good control. No use of NSAIDS, no heavy metals, toxins, no skin rash, no joint pains.\par Probable diabetic nephropathy. \par \par US w renal cycts\par Staying home bc of COVID\par Had vaccine\par Doing well\par 30 min telehealth\par

## 2021-03-09 ENCOUNTER — APPOINTMENT (OUTPATIENT)
Dept: NEUROLOGY | Facility: CLINIC | Age: 76
End: 2021-03-09
Payer: MEDICARE

## 2021-03-09 VITALS
HEART RATE: 70 BPM | WEIGHT: 147 LBS | BODY MASS INDEX: 21.77 KG/M2 | DIASTOLIC BLOOD PRESSURE: 74 MMHG | SYSTOLIC BLOOD PRESSURE: 132 MMHG | HEIGHT: 69 IN

## 2021-03-09 PROCEDURE — 99205 OFFICE O/P NEW HI 60 MIN: CPT

## 2021-03-09 NOTE — HISTORY OF PRESENT ILLNESS
[FreeTextEntry1] : Patient is seeing me for evaluation of parkinsonism\par \par Over the the past 6 years he has had progressive slowing of his movements. He walks with a stooped posture\par There has been no tremors. His wife says that he has difficulty reading and learning new information. 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 \par \par He was evaluated at [facility] in the past and found to have abnormal AIXA. He took up to 8 tabs of sinemet in the past with no clear benefit. Tooks doses 4-5 hrs apart and wife states he was very drowsy at that level. He currently takes 1 tab TID. Gait is stable with no freezing or falls recently. \par \par He coughs when he eats. Has mild sialorrhea\par + constipation managed with miralax\par Has had OH in the past 2 years related to medications. Remains on midodrine 2.5mg BID and follows with cardiologist\par + RBD \par \par PMH HTN, CAD s/p PCI, DM, CKD\par \par PD meds\par sinemet  1 tab TID\par midodrine 2,5mg BID

## 2021-03-09 NOTE — PHYSICAL EXAM
[FreeTextEntry1] : There is facial masking. Hypophonic. There are hypometric saccades Vertical>horizontal. Tremor is absent. There is 2+ rigidity in his neck. 1-2+ cogwheeling in his limbs. Eric 2 + distal >proximal, R>L. There is no ataxia. Walks with reduced SL. Right armswing is depressed. No FOG

## 2021-03-09 NOTE — DISCUSSION/SUMMARY
[FreeTextEntry1] : Patient with asymmetric bradykinesia and rigidity that is c/w parkinsonism. Asymmetry of his symptoms along with disease duration >7years and + RBD underscores likelihood of synuclein pathology. His intermittent VH are notable along with suboptimal levodopa response, which may be in part related to dosing as well as raynauds. \par \par Patient was counseled on the following recommendations:\par \par - take sinemet IR  1 tab 7-11-3\par - start sinemet CR  1 tab qhs\par - f/u with cards re. midodrine and need to continue it. \par - refer for swallow evaluation for mild dysphagia\par - refer for cognitive c/s with Dr. Yang to assess ?lewy body disease\par \par f/u 3months. Ask that he make an AM appointment and not take his morning L-dopa dose that day

## 2021-03-17 ENCOUNTER — APPOINTMENT (OUTPATIENT)
Dept: SPEECH THERAPY | Facility: CLINIC | Age: 76
End: 2021-03-17
Payer: MEDICARE

## 2021-03-17 PROCEDURE — 92610 EVALUATE SWALLOWING FUNCTION: CPT | Mod: GN

## 2021-04-20 ENCOUNTER — APPOINTMENT (OUTPATIENT)
Dept: ENDOCRINOLOGY | Facility: CLINIC | Age: 76
End: 2021-04-20
Payer: MEDICARE

## 2021-04-20 VITALS
SYSTOLIC BLOOD PRESSURE: 122 MMHG | TEMPERATURE: 97.9 F | HEART RATE: 69 BPM | HEIGHT: 69 IN | WEIGHT: 150 LBS | BODY MASS INDEX: 22.22 KG/M2 | DIASTOLIC BLOOD PRESSURE: 78 MMHG | OXYGEN SATURATION: 98 %

## 2021-04-20 PROCEDURE — 99214 OFFICE O/P EST MOD 30 MIN: CPT | Mod: 25

## 2021-04-20 PROCEDURE — 82962 GLUCOSE BLOOD TEST: CPT

## 2021-04-20 PROCEDURE — 83036 HEMOGLOBIN GLYCOSYLATED A1C: CPT | Mod: QW

## 2021-04-20 NOTE — REASON FOR VISIT
[Follow - Up] : a follow-up visit [DM Type 2] : DM Type 2 [Hypothyroidism] : hypothyroidism [Spouse] : spouse

## 2021-04-21 LAB
CREAT SPEC-SCNC: 116 MG/DL
GLUCOSE BLDC GLUCOMTR-MCNC: 194
HBA1C MFR BLD HPLC: 6.3
MICROALBUMIN 24H UR DL<=1MG/L-MCNC: 75.1 MG/DL
MICROALBUMIN/CREAT 24H UR-RTO: 648 MG/G

## 2021-04-21 NOTE — HISTORY OF PRESENT ILLNESS
[FreeTextEntry1] : No significant interval health changes. No interval surgery, hospitalizations, or fractures.\par \par Type 2 DM: Insulin doses decreased 3/24/20 for a hypo: Lantus 20, Novolog 6-2-3. BS appear well controlled by FS, mostly low 120s am and lunch, more variable ~dinner. No further hypos. Patient denies any polyuria or polydipsia.\par  \par Previously saw Dr. Toussaint from renal for diabetic nephropathy.  last  Cre: 2.19 , was allergic to ACE, unclear if ever on ARB. H/o uric acid kidney stones on Allopurinol.\par Renal US 2.4 cm R renal mass, ordered MRI to r/o carcinoma\par Saw podiatry within 3 months. Last ophtho <6 mons. \par \par Hypothyroidism: on LT4 75 mcg daily. No local neck pain. No dysphagia or dysphonia. No raciness, shakiness, heat/cold intolerance, tiredness, or fatigue. No palpitations, tremors, or sudden weight gain/loss.\par \par Was adm for orthostatic hypotension 7/2019, stopped anti-HTN Rx. added midodrine\par Neuro stopped Parkinson's rx, dx not clear. \par \par Dx with prostate CA in 2018. Began RT May 2019.\par \par Added extended release carbidopa/levodopa.

## 2021-04-21 NOTE — ASSESSMENT
[Diabetic Medications] : Risks and benefits of diabetic medications were discussed [Levothyroxine] : The patient was instructed to take Levothyroxine on an empty stomach, separate from vitamins, and wait at least 30 minutes before eating [FreeTextEntry1] : 77 y/o male with:\par \par 1. T2DM: Pt appears to be tightly controlled on NovoLog 6/2/3 and Lantus 20 units. Last vist A1c 6.4%. A1c 4/2021 6.3%.\par \par Goals of diabetes care was reviewed previously. Looser targets of hemoglobin A1c in older patients reviewed. If pt has more frequent hypos, consider Lantus 18 units.\par \par Treatment of Diabetes in Older Adults: An Endocrine Society* Clinical Practice Guideline Tom Milan  et al. The Journal of Clinical Endocrinology & Metabolism, Volume 104, Issue 5, May 2019, Pages 1520–157\par \par 2. Hypothyroidism. Clinically euthyroid on LT4 75 mcg daily. No local neck pain. No dysphagia or dysphonia. No raciness, shakiness, heat/cold intolerance, tiredness, or fatigue. No palpitations, tremors, or sudden weight gain/loss.\par \par 3. Hyperlipidemia, currently on atorvastatin. Last LDL 37.\par \par Labs 2/2021 reviewed: Creatinine 2.35, normal. A1c 6.7%. Summer 2020 TSH 1.69, normal. \par \par Request 24 hr urine protein test.\par \par F/u in 4 months

## 2021-04-21 NOTE — END OF VISIT
[FreeTextEntry3] : I, Jordon Smith, authored this note working as a medical scribe for Dr. Correia.  04/20/2021. 10:15AM. This note was authored by the medical scribe for me. I have reviewed, edited, and revised the note as needed. I am in agreement with the exam findings, imaging findings, and treatment plan.  Dale Correia MD

## 2021-04-21 NOTE — PHYSICAL EXAM
[Alert] : alert [Well Nourished] : well nourished [No Acute Distress] : no acute distress [Well Developed] : well developed [Normal Sclera/Conjunctiva] : normal sclera/conjunctiva [EOMI] : extra ocular movement intact [No Proptosis] : no proptosis [Thyroid Not Enlarged] : the thyroid was not enlarged [No Thyroid Nodules] : no palpable thyroid nodules [Clear to Auscultation] : lungs were clear to auscultation bilaterally [Normal S1, S2] : normal S1 and S2 [Normal Rate] : heart rate was normal [Regular Rhythm] : with a regular rhythm [No Edema] : no peripheral edema [Normal Bowel Sounds] : normal bowel sounds [Not Tender] : non-tender [Not Distended] : not distended [Soft] : abdomen soft [Normal Anterior Cervical Nodes] : no anterior cervical lymphadenopathy [No Spinal Tenderness] : no spinal tenderness [Spine Straight] : spine straight [No Stigmata of Cushings Syndrome] : no stigmata of Cushings Syndrome [Normal Gait] : normal gait [Normal Reflexes] : deep tendon reflexes were 2+ and symmetric [No Tremors] : no tremors [Normal Sensation on Monofilament Testing] : normal sensation on monofilament testing of lower extremities [Oriented x3] : oriented to person, place, and time [de-identified] : decreased pedal pulses [de-identified] : skin intact

## 2021-04-21 NOTE — QUALITY MEASURES
[Nephrology Follow-Up] : patient is currently receiving treatment via nephrology follow-up [Visual inspection, sensory exam] : Foot exam, including visual inspection, sensory exam with mono filament, and pulse exam, was performed within the last 12 months

## 2021-06-14 ENCOUNTER — APPOINTMENT (OUTPATIENT)
Dept: NEUROLOGY | Facility: CLINIC | Age: 76
End: 2021-06-14
Payer: MEDICARE

## 2021-06-14 VITALS
WEIGHT: 148 LBS | BODY MASS INDEX: 21.92 KG/M2 | HEART RATE: 66 BPM | SYSTOLIC BLOOD PRESSURE: 159 MMHG | DIASTOLIC BLOOD PRESSURE: 72 MMHG | HEIGHT: 69 IN

## 2021-06-14 DIAGNOSIS — I73.00 RAYNAUD'S SYNDROME W/OUT GANGRENE: ICD-10-CM

## 2021-06-14 DIAGNOSIS — I25.10 ATHEROSCLEROTIC HEART DISEASE OF NATIVE CORONARY ARTERY W/OUT ANGINA PECTORIS: ICD-10-CM

## 2021-06-14 DIAGNOSIS — H53.40 UNSPECIFIED VISUAL FIELD DEFECTS: ICD-10-CM

## 2021-06-14 DIAGNOSIS — I63.9 CEREBRAL INFARCTION, UNSPECIFIED: ICD-10-CM

## 2021-06-14 DIAGNOSIS — Z87.898 PERSONAL HISTORY OF OTHER SPECIFIED CONDITIONS: ICD-10-CM

## 2021-06-14 DIAGNOSIS — G47.52 REM SLEEP BEHAVIOR DISORDER: ICD-10-CM

## 2021-06-14 PROCEDURE — 96116 NUBHVL XM PHYS/QHP 1ST HR: CPT | Mod: 59

## 2021-06-14 PROCEDURE — 99215 OFFICE O/P EST HI 40 MIN: CPT

## 2021-06-14 RX ORDER — ASPIRIN 81 MG
81 TABLET, DELAYED RELEASE (ENTERIC COATED) ORAL
Refills: 0 | Status: ACTIVE | COMMUNITY

## 2021-06-15 LAB — T PALLIDUM AB SER QL IA: NEGATIVE

## 2021-06-18 ENCOUNTER — APPOINTMENT (OUTPATIENT)
Dept: NEUROLOGY | Facility: CLINIC | Age: 76
End: 2021-06-18
Payer: MEDICARE

## 2021-06-18 VITALS
BODY MASS INDEX: 21.92 KG/M2 | DIASTOLIC BLOOD PRESSURE: 73 MMHG | SYSTOLIC BLOOD PRESSURE: 162 MMHG | HEART RATE: 62 BPM | WEIGHT: 148 LBS | HEIGHT: 69 IN

## 2021-06-18 PROCEDURE — 99214 OFFICE O/P EST MOD 30 MIN: CPT

## 2021-06-18 NOTE — PHYSICAL EXAM
[FreeTextEntry1] : OFF meds\par \par There is facial masking. Hypophonic. There are hypometric saccades.  Tremor is absent. There is 2+ rigidity in his neck. 2+ cogwheeling in his limbs. Eric 3+ distal >proximal, R>L. There is no ataxia. Walks with reduced SL. Right armswing is depressed. No FOG.

## 2021-06-18 NOTE — HISTORY OF PRESENT ILLNESS
[FreeTextEntry1] : Patient is now on 4 tabs of sinemet throughout the day. Has not noticed much motoric changes with levodopa\par Denies any hallucinations\par Denies any daytime confusion\par He saw Dr. Yang who ordered a FDG PET and brain MRI with provisional diagnosis of mild dementia. he will also have neuropsych testing\par \par Swallow study showed mild dysphagia and recommended soft solid and thin liquids\par BPs have been running high\par \par \par He coughs when he eats. Has mild sialorrhea\par + constipation managed with miralax\par + RBD \par \par PMH HTN, CAD s/p PCI, DM, CKD\par \par PD meds\par sinemet  1 tab TID\par sinemet ER  1 tab qhs\par midodrine 2,5mg BID

## 2021-06-18 NOTE — DISCUSSION/SUMMARY
[FreeTextEntry1] : Parkinsonism with suboptimal LD response. \par Mild dementia ?LBD vs. vascular component. Psychiatric state stable \par \par \par Patient was counseled on the following recommendations:\par \par -inc. sinemet to 1 tab at 7-10-1-4\par - cont ER qhs\par - start PT\par - will f/u PET and brain MRI\par \par f/u 3months

## 2021-06-21 ENCOUNTER — APPOINTMENT (OUTPATIENT)
Dept: NUCLEAR MEDICINE | Facility: IMAGING CENTER | Age: 76
End: 2021-06-21
Payer: MEDICARE

## 2021-06-21 ENCOUNTER — OUTPATIENT (OUTPATIENT)
Dept: OUTPATIENT SERVICES | Facility: HOSPITAL | Age: 76
LOS: 1 days | End: 2021-06-21
Payer: MEDICARE

## 2021-06-21 DIAGNOSIS — Z98.89 OTHER SPECIFIED POSTPROCEDURAL STATES: Chronic | ICD-10-CM

## 2021-06-21 DIAGNOSIS — Z95.5 PRESENCE OF CORONARY ANGIOPLASTY IMPLANT AND GRAFT: Chronic | ICD-10-CM

## 2021-06-21 DIAGNOSIS — F03.90 UNSPECIFIED DEMENTIA WITHOUT BEHAVIORAL DISTURBANCE: ICD-10-CM

## 2021-06-21 DIAGNOSIS — N20.0 CALCULUS OF KIDNEY: Chronic | ICD-10-CM

## 2021-06-21 DIAGNOSIS — Z95.818 PRESENCE OF OTHER CARDIAC IMPLANTS AND GRAFTS: Chronic | ICD-10-CM

## 2021-06-21 DIAGNOSIS — Z98.49 CATARACT EXTRACTION STATUS, UNSPECIFIED EYE: Chronic | ICD-10-CM

## 2021-06-21 LAB — VIT B1 SERPL-MCNC: 127.1 NMOL/L

## 2021-06-21 PROCEDURE — A9552: CPT

## 2021-06-21 PROCEDURE — 78608 BRAIN IMAGING (PET): CPT | Mod: 26,MH

## 2021-06-21 PROCEDURE — 78608 BRAIN IMAGING (PET): CPT

## 2021-06-22 ENCOUNTER — NON-APPOINTMENT (OUTPATIENT)
Age: 76
End: 2021-06-22

## 2021-07-03 ENCOUNTER — APPOINTMENT (OUTPATIENT)
Dept: MRI IMAGING | Facility: IMAGING CENTER | Age: 76
End: 2021-07-03
Payer: MEDICARE

## 2021-07-03 ENCOUNTER — OUTPATIENT (OUTPATIENT)
Dept: OUTPATIENT SERVICES | Facility: HOSPITAL | Age: 76
LOS: 1 days | End: 2021-07-03
Payer: MEDICARE

## 2021-07-03 DIAGNOSIS — Z98.89 OTHER SPECIFIED POSTPROCEDURAL STATES: Chronic | ICD-10-CM

## 2021-07-03 DIAGNOSIS — N20.0 CALCULUS OF KIDNEY: Chronic | ICD-10-CM

## 2021-07-03 DIAGNOSIS — Z98.49 CATARACT EXTRACTION STATUS, UNSPECIFIED EYE: Chronic | ICD-10-CM

## 2021-07-03 DIAGNOSIS — Z95.5 PRESENCE OF CORONARY ANGIOPLASTY IMPLANT AND GRAFT: Chronic | ICD-10-CM

## 2021-07-03 DIAGNOSIS — Z95.818 PRESENCE OF OTHER CARDIAC IMPLANTS AND GRAFTS: Chronic | ICD-10-CM

## 2021-07-03 DIAGNOSIS — G31.83 NEUROCOGNITIVE DISORDER WITH LEWY BODIES: ICD-10-CM

## 2021-07-03 PROCEDURE — 70551 MRI BRAIN STEM W/O DYE: CPT

## 2021-07-03 PROCEDURE — 70551 MRI BRAIN STEM W/O DYE: CPT | Mod: 26,MH

## 2021-07-07 ENCOUNTER — APPOINTMENT (OUTPATIENT)
Dept: PULMONOLOGY | Facility: CLINIC | Age: 76
End: 2021-07-07
Payer: MEDICARE

## 2021-07-07 VITALS
HEART RATE: 64 BPM | TEMPERATURE: 98 F | OXYGEN SATURATION: 99 % | WEIGHT: 146.38 LBS | SYSTOLIC BLOOD PRESSURE: 132 MMHG | BODY MASS INDEX: 21.62 KG/M2 | DIASTOLIC BLOOD PRESSURE: 77 MMHG | RESPIRATION RATE: 16 BRPM

## 2021-07-07 DIAGNOSIS — G47.00 INSOMNIA, UNSPECIFIED: ICD-10-CM

## 2021-07-07 DIAGNOSIS — G47.8 OTHER SLEEP DISORDERS: ICD-10-CM

## 2021-07-07 DIAGNOSIS — G47.52 REM SLEEP BEHAVIOR DISORDER: ICD-10-CM

## 2021-07-07 PROCEDURE — 99204 OFFICE O/P NEW MOD 45 MIN: CPT | Mod: GC

## 2021-07-07 NOTE — ASSESSMENT
[FreeTextEntry1] : 77 yo M with hx of Lewy Body dementia, DM, CKD III, CAD, Stroke in the past, Hypothyroid, HTN, HLD presenting with EDS, fatigue, and fragmented sleep, as well as abnormal movements during sleep. \par \par A multitude of sleep pathologies are possible in this patient with above mentioned neurological, cardiac, and renal issues. Sleep apnea (both central and obstructive) are possible components given his comorbidities, physical exam showing crowded airway, and symptoms including snoring. With his parkinson disease, RBD is also possible especially with the witnessed episodes of thrashing in bed with vocalizations. While medications (i.e. sinemet) may be playing a role in his subjective symptoms, there is a need to rule out the above mentioned sleep pathologies. \par \par Will proceed with inpatient PSG to assess for REM Behavior Disorder as well as possible sleep disordered breathing. . Plan to be formulated based on results

## 2021-07-07 NOTE — PHYSICAL EXAM
[No Acute Distress] : no acute distress [Normal Oropharynx] : normal oropharynx [Retrognathia] : retrognathia [III] : Mallampati Class: III [Normal Appearance] : normal appearance [Supple] : supple [No Neck Mass] : no neck mass [No JVD] : no jvd [Normal Rate/Rhythm] : normal rate/rhythm [Normal S1, S2] : normal s1, s2 [No Murmurs] : no murmurs [No Resp Distress] : no resp distress [Clear to Auscultation Bilaterally] : clear to auscultation bilaterally [No Abnormalities] : no abnormalities [Benign] : benign [No Clubbing] : no clubbing [No Cyanosis] : no cyanosis [No Edema] : no edema [No Focal Deficits] : no focal deficits [Oriented x3] : oriented x3 [Normal Mood] : normal mood [TextBox_2] : masked facies

## 2021-07-07 NOTE — HISTORY OF PRESENT ILLNESS
[TextBox_4] : 75 yo M with hx of Lewy Body dementia, DM, CKD III, CAD, Stroke in the past, Hypothyroid, HTN, HLD referred by his neurologist to r/o sleep pathologies. He currently is experiencing EDS, fatigue, and awakenings at night subjectively. He has no difficulty initiating sleep and no other subjective sleep symptoms including leg discomfort, pain, hallucinations, etc. Overall he does get around 8h of sleep a night. Of note, his sinemet dose had to be adjusted in the past for daytime somnolence and fatigue. Current daytime somnolence also seems to somewhat follow administration of his sinemet. \par Wife at bedside (though she has not been in the same bedroom for the last 1.5y) states that he snores (w/ no witnessed apneas) and on a number of occasions has seen him thrash around or have jerking movements, accompanied by vocalizations. Patient has no recollection of this and also does not remember acting out his dreams. He has fallen twice off of his bed within the last couple of years, although it is unclear whether this was during sleep or at the time of awakening.\par \par Caffeine intake is only 1 can of coca cola every other day\par \par ESS 17

## 2021-08-24 ENCOUNTER — APPOINTMENT (OUTPATIENT)
Dept: ENDOCRINOLOGY | Facility: CLINIC | Age: 76
End: 2021-08-24
Payer: MEDICARE

## 2021-08-24 VITALS
HEIGHT: 69 IN | BODY MASS INDEX: 21.77 KG/M2 | TEMPERATURE: 96.1 F | DIASTOLIC BLOOD PRESSURE: 80 MMHG | WEIGHT: 147 LBS | HEART RATE: 70 BPM | OXYGEN SATURATION: 99 % | SYSTOLIC BLOOD PRESSURE: 120 MMHG

## 2021-08-24 DIAGNOSIS — E78.5 HYPERLIPIDEMIA, UNSPECIFIED: ICD-10-CM

## 2021-08-24 PROCEDURE — 99214 OFFICE O/P EST MOD 30 MIN: CPT | Mod: 25

## 2021-08-24 PROCEDURE — 83036 HEMOGLOBIN GLYCOSYLATED A1C: CPT | Mod: QW

## 2021-08-24 NOTE — HISTORY OF PRESENT ILLNESS
[FreeTextEntry1] : No significant interval health changes. No interval surgery, hospitalizations, or fractures.\par \par Type 2 DM: Tightly controlled on NovoLog 6/2/3 and Lantus 20 units. BS appear well controlled by FS, mostly ~120s am. Avg BS ~130. No further hypos. Patient denies any polyuria or polydipsia. Saw podiatry within past 3 months. Last ophtho within past 6 months.\par \par Hypothyroidism: on LT4 75 mcg daily. No local neck pain. No dysphagia or dysphonia. No raciness, shakiness, heat/cold intolerance, tiredness, or fatigue. No palpitations, tremors, or sudden weight gain/loss.\par \par Previously saw Dr. Toussaint from renal for diabetic nephropathy. Prior Cre: 2.19, was allergic to ACE, unclear if ever on ARB. H/o uric acid kidney stones on Allopurinol. No recurrence. Renal US 2.4 cm R renal mass, ordered MRI to r/o carcinoma.\par \par Was adm for orthostatic hypotension 7/2019, stopped anti-HTN Rx. Added midodrine.\par Neuro stopped Parkinson's rx, dx not clear. \par \par Dx with prostate CA in 2018. Began RT May 2019.\par \par Added extended release carbidopa/levodopa for Parkinsons.

## 2021-08-24 NOTE — PHYSICAL EXAM
[Alert] : alert [Well Nourished] : well nourished [No Acute Distress] : no acute distress [Well Developed] : well developed [Normal Sclera/Conjunctiva] : normal sclera/conjunctiva [EOMI] : extra ocular movement intact [No Proptosis] : no proptosis [Thyroid Not Enlarged] : the thyroid was not enlarged [No Thyroid Nodules] : no palpable thyroid nodules [Clear to Auscultation] : lungs were clear to auscultation bilaterally [Normal S1, S2] : normal S1 and S2 [Normal Rate] : heart rate was normal [Regular Rhythm] : with a regular rhythm [No Edema] : no peripheral edema [Normal Bowel Sounds] : normal bowel sounds [Not Tender] : non-tender [Not Distended] : not distended [Soft] : abdomen soft [Normal Anterior Cervical Nodes] : no anterior cervical lymphadenopathy [No Spinal Tenderness] : no spinal tenderness [Spine Straight] : spine straight [No Stigmata of Cushings Syndrome] : no stigmata of Cushings Syndrome [Normal Gait] : normal gait [Normal Reflexes] : deep tendon reflexes were 2+ and symmetric [No Tremors] : no tremors [Normal Sensation on Monofilament Testing] : normal sensation on monofilament testing of lower extremities [Oriented x3] : oriented to person, place, and time [de-identified] : decreased pedal pulses [de-identified] : skin intact

## 2021-08-24 NOTE — END OF VISIT
[FreeTextEntry3] : I, Jordon Smith, authored this note working as a medical scribe for Dr. Correia.  08/24/2021.  1:15PM. This note was authored by the medical scribe for me. I have reviewed, edited, and revised the note as needed. I am in agreement with the exam findings, imaging findings, and treatment plan.  Dale Correia MD

## 2021-08-24 NOTE — ASSESSMENT
[Levothyroxine] : The patient was instructed to take Levothyroxine on an empty stomach, separate from vitamins, and wait at least 30 minutes before eating [Diabetic Medications] : Risks and benefits of diabetic medications were discussed [FreeTextEntry1] : 77 y/o male with:\par \par 1. T2DM: Pt appears to be tightly controlled on NovoLog 6/2/3 and Lantus 20 units. 4/2021 A1c 6.3%. No further hypos. Normal monofilament test. 8/2021 A1c 6.4%.\par \par Goals of diabetes care was reviewed previously. Looser targets in older patients reviewed. Recommend pt skip Novalog 2 units at lunch and see if there is any difference in BS. I discussed the importance of diet management and exercise as weight loss will help manage diabetes. If there are any hypos/hypers, pt should call me or Akanksha.\par \par Treatment of Diabetes in Older Adults: An Endocrine Society* Clinical Practice Guideline Tom Milan  et al. The Journal of Clinical Endocrinology & Metabolism, Volume 104, Issue 5, May 2019, Pages 1520–1575\par \par 2. Hypothyroidism. Clinically euthyroid on LT4 75 mcg daily. No local neck pain. No dysphagia or dysphonia. No raciness, shakiness, heat/cold intolerance, tiredness, or fatigue. No palpitations, tremors, or sudden weight gain/loss. Repeat TFT.\par \par 3. HLD well controlled on current medication.\par \par Request labs sent out. Pt will have labs done w/ PCP Dr. Jabier Geronimo.\par \par F/u in 4 months

## 2021-08-31 ENCOUNTER — APPOINTMENT (OUTPATIENT)
Dept: SLEEP CENTER | Facility: CLINIC | Age: 76
End: 2021-08-31
Payer: MEDICARE

## 2021-08-31 ENCOUNTER — OUTPATIENT (OUTPATIENT)
Dept: OUTPATIENT SERVICES | Facility: HOSPITAL | Age: 76
LOS: 1 days | End: 2021-08-31
Payer: MEDICARE

## 2021-08-31 DIAGNOSIS — Z95.5 PRESENCE OF CORONARY ANGIOPLASTY IMPLANT AND GRAFT: Chronic | ICD-10-CM

## 2021-08-31 DIAGNOSIS — Z98.89 OTHER SPECIFIED POSTPROCEDURAL STATES: Chronic | ICD-10-CM

## 2021-08-31 DIAGNOSIS — N20.0 CALCULUS OF KIDNEY: Chronic | ICD-10-CM

## 2021-08-31 DIAGNOSIS — Z95.818 PRESENCE OF OTHER CARDIAC IMPLANTS AND GRAFTS: Chronic | ICD-10-CM

## 2021-08-31 DIAGNOSIS — Z98.49 CATARACT EXTRACTION STATUS, UNSPECIFIED EYE: Chronic | ICD-10-CM

## 2021-08-31 PROCEDURE — 95810 POLYSOM 6/> YRS 4/> PARAM: CPT | Mod: 26

## 2021-08-31 PROCEDURE — 95810 POLYSOM 6/> YRS 4/> PARAM: CPT

## 2021-09-01 DIAGNOSIS — G47.33 OBSTRUCTIVE SLEEP APNEA (ADULT) (PEDIATRIC): ICD-10-CM

## 2021-09-22 ENCOUNTER — NON-APPOINTMENT (OUTPATIENT)
Age: 76
End: 2021-09-22

## 2021-09-24 ENCOUNTER — APPOINTMENT (OUTPATIENT)
Dept: NEUROLOGY | Facility: CLINIC | Age: 76
End: 2021-09-24
Payer: MEDICARE

## 2021-09-24 VITALS
DIASTOLIC BLOOD PRESSURE: 76 MMHG | HEIGHT: 69 IN | BODY MASS INDEX: 21.92 KG/M2 | SYSTOLIC BLOOD PRESSURE: 128 MMHG | HEART RATE: 63 BPM | WEIGHT: 148 LBS

## 2021-09-24 PROCEDURE — 99214 OFFICE O/P EST MOD 30 MIN: CPT

## 2021-09-24 NOTE — PHYSICAL EXAM
[FreeTextEntry1] : \par There is facial masking. Hypophonic. There are hypometric saccades. Tremor is absent. There is 2+ rigidity in his neck. 2+ cogwheeling in his limbs. Eric 3+ distal >proximal, R>L. There is no ataxia. Walks with reduced SL. Right armswing is depressed. No FOG.

## 2021-09-24 NOTE — HISTORY OF PRESENT ILLNESS
[FreeTextEntry1] : Patient saw Dr. Yang. His PET was c/w DLB\par He is pending neuropsych testing, Memory deficits persist\par With his current regimen he does not feel as tired. \par He falls asleep while watching TV\par Denies any falls\par Is not aware of LD kinetics\par Denies any hallucinations or periods of disorientation\par Swallow study showed mild dysphagia and recommended soft solid and thin liquids\par BPs have been stable. \par Does ADLs independently, albeit slowly. Uses a rollator when outdoors, which allows him to walk longer distances\par \par He coughs when he eats. Has mild sialorrhea\par + constipation managed with miralax\par + RBD \par \par PMH HTN, CAD s/p PCI, DM, CKD\par \par PD meds\par sinemet  1 tab QID\par sinemet ER  1 tab qhs\par midodrine 2,5mg qd

## 2021-09-24 NOTE — DISCUSSION/SUMMARY
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 Yang\par \par f/u 3months. \par

## 2021-09-27 ENCOUNTER — APPOINTMENT (OUTPATIENT)
Dept: NEPHROLOGY | Facility: CLINIC | Age: 76
End: 2021-09-27
Payer: MEDICARE

## 2021-09-27 VITALS
WEIGHT: 148 LBS | HEIGHT: 69 IN | SYSTOLIC BLOOD PRESSURE: 118 MMHG | DIASTOLIC BLOOD PRESSURE: 77 MMHG | BODY MASS INDEX: 21.92 KG/M2 | HEART RATE: 65 BPM | OXYGEN SATURATION: 98 % | TEMPERATURE: 97.2 F

## 2021-09-27 PROCEDURE — 99214 OFFICE O/P EST MOD 30 MIN: CPT

## 2021-09-27 NOTE — PHYSICAL EXAM
[Auscultation Breath Sounds / Voice Sounds] : lungs were clear to auscultation bilaterally [Heart Sounds Pericardial Friction Rub] : no pericardial rub

## 2021-09-27 NOTE — HISTORY OF PRESENT ILLNESS
[FreeTextEntry1] : 67 year old man hx DM, Htn, nosebleeds, nephrolithiasis, CAD, stent referred for CKD and albuminuria.\par \par Pt has had DM for many years with geenrally good control. No use of NSAIDS, no heavy metals, toxins, no skin rash, no joint pains.\par \par Lost 25 lbs during covid\par Feels well\par But, PCP noted elevated creat recently- usually 2.0, now was 2.5 [Home] : at home, [unfilled] , at the time of the visit. [Medical Office: ([facility])___] : at the medical office located in

## 2021-09-27 NOTE — REASON FOR VISIT
[Home] : at home, [unfilled] , at the time of the visit. [Medical Office: ([facility])___] : at the medical office located in

## 2021-09-28 LAB
ALBUMIN SERPL ELPH-MCNC: 4.3 G/DL
ALP BLD-CCNC: 132 U/L
ALT SERPL-CCNC: <5 U/L
ANION GAP SERPL CALC-SCNC: 14 MMOL/L
APPEARANCE: CLEAR
AST SERPL-CCNC: 10 U/L
BACTERIA: NEGATIVE
BASOPHILS # BLD AUTO: 0.04 K/UL
BASOPHILS NFR BLD AUTO: 0.5 %
BILIRUB SERPL-MCNC: 0.4 MG/DL
BILIRUBIN URINE: NEGATIVE
BLOOD URINE: NEGATIVE
BUN SERPL-MCNC: 47 MG/DL
CALCIUM SERPL-MCNC: 9.2 MG/DL
CHLORIDE SERPL-SCNC: 104 MMOL/L
CO2 SERPL-SCNC: 22 MMOL/L
COLOR: YELLOW
CREAT SERPL-MCNC: 2.21 MG/DL
CREAT SPEC-SCNC: 108 MG/DL
CREAT/PROT UR: 1.4 RATIO
EOSINOPHIL # BLD AUTO: 0.35 K/UL
EOSINOPHIL NFR BLD AUTO: 4.7 %
GLUCOSE QUALITATIVE U: NEGATIVE
GLUCOSE SERPL-MCNC: 129 MG/DL
HCT VFR BLD CALC: 36.1 %
HGB BLD-MCNC: 11.7 G/DL
HYALINE CASTS: 4 /LPF
IMM GRANULOCYTES NFR BLD AUTO: 0.3 %
KETONES URINE: NEGATIVE
LEUKOCYTE ESTERASE URINE: NEGATIVE
LYMPHOCYTES # BLD AUTO: 1.04 K/UL
LYMPHOCYTES NFR BLD AUTO: 13.9 %
MAN DIFF?: NORMAL
MCHC RBC-ENTMCNC: 29 PG
MCHC RBC-ENTMCNC: 32.4 GM/DL
MCV RBC AUTO: 89.6 FL
MICROSCOPIC-UA: NORMAL
MONOCYTES # BLD AUTO: 0.72 K/UL
MONOCYTES NFR BLD AUTO: 9.7 %
NEUTROPHILS # BLD AUTO: 5.29 K/UL
NEUTROPHILS NFR BLD AUTO: 70.9 %
NITRITE URINE: NEGATIVE
PH URINE: 6
PHOSPHATE SERPL-MCNC: 4 MG/DL
PLATELET # BLD AUTO: 169 K/UL
POTASSIUM SERPL-SCNC: 4.9 MMOL/L
PROT SERPL-MCNC: 7 G/DL
PROT UR-MCNC: 145 MG/DL
PROTEIN URINE: ABNORMAL
RBC # BLD: 4.03 M/UL
RBC # FLD: 13.2 %
RED BLOOD CELLS URINE: 5 /HPF
SODIUM SERPL-SCNC: 140 MMOL/L
SPECIFIC GRAVITY URINE: 1.02
SQUAMOUS EPITHELIAL CELLS: 1 /HPF
UROBILINOGEN URINE: NORMAL
WBC # FLD AUTO: 7.46 K/UL
WHITE BLOOD CELLS URINE: 2 /HPF

## 2021-10-02 ENCOUNTER — OUTPATIENT (OUTPATIENT)
Dept: OUTPATIENT SERVICES | Facility: HOSPITAL | Age: 76
LOS: 1 days | End: 2021-10-02
Payer: MEDICARE

## 2021-10-02 ENCOUNTER — APPOINTMENT (OUTPATIENT)
Dept: ULTRASOUND IMAGING | Facility: CLINIC | Age: 76
End: 2021-10-02
Payer: MEDICARE

## 2021-10-02 DIAGNOSIS — Z98.49 CATARACT EXTRACTION STATUS, UNSPECIFIED EYE: Chronic | ICD-10-CM

## 2021-10-02 DIAGNOSIS — N18.30 CHRONIC KIDNEY DISEASE, STAGE 3 UNSPECIFIED: ICD-10-CM

## 2021-10-02 DIAGNOSIS — Z98.89 OTHER SPECIFIED POSTPROCEDURAL STATES: Chronic | ICD-10-CM

## 2021-10-02 DIAGNOSIS — Z95.818 PRESENCE OF OTHER CARDIAC IMPLANTS AND GRAFTS: Chronic | ICD-10-CM

## 2021-10-02 DIAGNOSIS — Z95.5 PRESENCE OF CORONARY ANGIOPLASTY IMPLANT AND GRAFT: Chronic | ICD-10-CM

## 2021-10-02 DIAGNOSIS — N20.0 CALCULUS OF KIDNEY: Chronic | ICD-10-CM

## 2021-10-02 PROCEDURE — 76775 US EXAM ABDO BACK WALL LIM: CPT | Mod: 26

## 2021-10-02 PROCEDURE — 76775 US EXAM ABDO BACK WALL LIM: CPT

## 2021-10-08 NOTE — END OF VISIT
Time Spent: ___ minutes] : I have spent [unfilled] minutes of time on the encounter.

## 2021-10-14 ENCOUNTER — APPOINTMENT (OUTPATIENT)
Dept: NEUROLOGY | Facility: CLINIC | Age: 76
End: 2021-10-14
Payer: MEDICARE

## 2021-10-14 PROCEDURE — 96121 NUBHVL XM PHY/QHP EA ADDL HR: CPT | Mod: 95

## 2021-10-14 PROCEDURE — 96138 PSYCL/NRPSYC TECH 1ST: CPT | Mod: NC,95

## 2021-10-14 PROCEDURE — 96139 PSYCL/NRPSYC TST TECH EA: CPT | Mod: NC,95

## 2021-10-14 PROCEDURE — 96116 NUBHVL XM PHYS/QHP 1ST HR: CPT | Mod: 95

## 2021-10-21 ENCOUNTER — APPOINTMENT (OUTPATIENT)
Dept: NEUROLOGY | Facility: CLINIC | Age: 76
End: 2021-10-21
Payer: MEDICARE

## 2021-10-21 PROCEDURE — 96133 NRPSYC TST EVAL PHYS/QHP EA: CPT

## 2021-10-21 PROCEDURE — 96139 PSYCL/NRPSYC TST TECH EA: CPT | Mod: NC

## 2021-11-18 ENCOUNTER — APPOINTMENT (OUTPATIENT)
Dept: NEPHROLOGY | Facility: CLINIC | Age: 76
End: 2021-11-18
Payer: MEDICARE

## 2021-11-18 VITALS
OXYGEN SATURATION: 99 % | TEMPERATURE: 97.16 F | HEART RATE: 65 BPM | HEIGHT: 69 IN | BODY MASS INDEX: 22.36 KG/M2 | WEIGHT: 151 LBS | SYSTOLIC BLOOD PRESSURE: 128 MMHG | DIASTOLIC BLOOD PRESSURE: 77 MMHG

## 2021-11-18 PROCEDURE — 99213 OFFICE O/P EST LOW 20 MIN: CPT

## 2021-11-18 NOTE — HISTORY OF PRESENT ILLNESS
[FreeTextEntry1] : 67 year old man hx DM, Htn, nosebleeds, nephrolithiasis, CAD, stent referred for CKD and albuminuria.\par \par Pt has had DM for many years with geenrally good control. No use of NSAIDS, no heavy metals, toxins, no skin rash, no joint pains.\par \par Lost 25 lbs during covid\par Feels well\par Scr has stabilized\par Feels well [Home] : at home, [unfilled] , at the time of the visit. [Medical Office: ([facility])___] : at the medical office located in

## 2021-11-18 NOTE — ASSESSMENT
[FreeTextEntry1] : 67 year old man hx DM, Htn, nosebleeds, nephrolithiasis, CAD, stent referred for CKD and albuminuria.\par Pt has had DM for many years with geenrally good control. No use of NSAIDS, no heavy metals, toxins, no skin rash, no joint pains.\par Probable diabetic nephropathy. \par \par Lost 25 lbs during covid\par Feels well\par Scr stabilized\par Sono shows cusy stable\par \par

## 2021-11-18 NOTE — REASON FOR VISIT
[Home] : at home, [unfilled] , at the time of the visit. [Medical Office: ([facility])___] : at the medical office located in

## 2021-11-22 ENCOUNTER — APPOINTMENT (OUTPATIENT)
Dept: NEUROLOGY | Facility: CLINIC | Age: 76
End: 2021-11-22
Payer: MEDICARE

## 2021-11-22 VITALS
WEIGHT: 148 LBS | DIASTOLIC BLOOD PRESSURE: 78 MMHG | BODY MASS INDEX: 21.92 KG/M2 | HEIGHT: 69 IN | SYSTOLIC BLOOD PRESSURE: 164 MMHG | HEART RATE: 60 BPM

## 2021-11-22 DIAGNOSIS — R06.83 SNORING: ICD-10-CM

## 2021-11-22 PROCEDURE — 99215 OFFICE O/P EST HI 40 MIN: CPT

## 2021-11-23 ENCOUNTER — NON-APPOINTMENT (OUTPATIENT)
Age: 76
End: 2021-11-23

## 2021-12-06 ENCOUNTER — APPOINTMENT (OUTPATIENT)
Dept: NEUROLOGY | Facility: CLINIC | Age: 76
End: 2021-12-06
Payer: MEDICARE

## 2021-12-06 PROCEDURE — 96133 NRPSYC TST EVAL PHYS/QHP EA: CPT

## 2021-12-07 ENCOUNTER — APPOINTMENT (OUTPATIENT)
Dept: GERIATRICS | Facility: CLINIC | Age: 76
End: 2021-12-07
Payer: MEDICARE

## 2021-12-07 ENCOUNTER — NON-APPOINTMENT (OUTPATIENT)
Age: 76
End: 2021-12-07

## 2021-12-07 VITALS
BODY MASS INDEX: 21.92 KG/M2 | DIASTOLIC BLOOD PRESSURE: 62 MMHG | HEIGHT: 69 IN | WEIGHT: 148 LBS | HEART RATE: 71 BPM | OXYGEN SATURATION: 99 % | SYSTOLIC BLOOD PRESSURE: 140 MMHG

## 2021-12-07 DIAGNOSIS — R63.4 ABNORMAL WEIGHT LOSS: ICD-10-CM

## 2021-12-07 DIAGNOSIS — Z13.820 ENCOUNTER FOR SCREENING FOR OSTEOPOROSIS: ICD-10-CM

## 2021-12-07 DIAGNOSIS — Z85.821 PERSONAL HISTORY OF MERKEL CELL CARCINOMA: ICD-10-CM

## 2021-12-07 DIAGNOSIS — Z87.891 PERSONAL HISTORY OF NICOTINE DEPENDENCE: ICD-10-CM

## 2021-12-07 DIAGNOSIS — Z13.21 ENCOUNTER FOR SCREENING FOR NUTRITIONAL DISORDER: ICD-10-CM

## 2021-12-07 DIAGNOSIS — G31.83 DEMENTIA WITH LEWY BODIES: ICD-10-CM

## 2021-12-07 DIAGNOSIS — R35.1 NOCTURIA: ICD-10-CM

## 2021-12-07 DIAGNOSIS — R32 UNSPECIFIED URINARY INCONTINENCE: ICD-10-CM

## 2021-12-07 DIAGNOSIS — G47.52 REM SLEEP BEHAVIOR DISORDER: ICD-10-CM

## 2021-12-07 DIAGNOSIS — F02.80 DEMENTIA WITH LEWY BODIES: ICD-10-CM

## 2021-12-07 DIAGNOSIS — Z86.79 PERSONAL HISTORY OF OTHER DISEASES OF THE CIRCULATORY SYSTEM: ICD-10-CM

## 2021-12-07 PROCEDURE — 99205 OFFICE O/P NEW HI 60 MIN: CPT | Mod: 25

## 2021-12-07 PROCEDURE — G0444 DEPRESSION SCREEN ANNUAL: CPT | Mod: 59

## 2021-12-07 NOTE — PHYSICAL EXAM
[Normal Hearing] : hearing was normal [Normal] : no spinal tenderness [No Clubbing, Cyanosis] : no clubbing or cyanosis of the fingernails [Normal Color / Pigmentation] : normal skin color and pigmentation [Normal Insight/Judgment] : insight and judgment were not intact [de-identified] : slow unsteady gait  [de-identified] : alert, unsteady gait, bradykinesia, UE rigidity [de-identified] : calm, cooperative, joking mood

## 2021-12-07 NOTE — REVIEW OF SYSTEMS
[Incontinence] : incontinence [Loss Of Hearing] : no hearing loss [As Noted in HPI] : as noted in HPI [Negative] : Heme/Lymph [FreeTextEntry9] : calf pain when walks longer distances

## 2021-12-07 NOTE — HISTORY OF PRESENT ILLNESS
[Patient declined hearing screen] : Patient declined hearing screen [Patient is independent with] : bathing [Independent] : transferring/mobility [Full assistance needed] : Assistance needed managing medications [] : Assistance needed managing finances. [Smoke Detector] : smoke detector [Carbon Monoxide Detector] : carbon monoxide detector [Grab Bars] : grab bars [Night Light] : night light [Wears Seat Belt] : wears seat belt [0] : 2) Feeling down, depressed, or hopeless: Not at all (0) [PHQ-2 Negative - No further assessment needed] : PHQ-2 Negative - No further assessment needed [Designated Healthcare Proxy] : Designated healthcare proxy [TextBox_31] : denies hearing problems  [BoneDensityDate] : 8/21  [TextBox_37] : follows w/ optho dr. lim - last seen in summer '21, wears reading glasses , s/p b/l cataract sx  [One fall no injury in past year] : Patient reported one fall in the past year without injury [Walker] : walker [Guns at Home] : no guns at home [Shower Chair] : no shower chair [Driving Concerns] : not driving or driving without noted concerns [facility]] : >12  [FreeTextEntry1] : has grab bars  [FreeTextEntry2] : uses device to put socks on  [FreeTextEntry8] : occasional UI mainly at night, has f/u with urologist [de-identified] : only calls wife but unable to call anyone else on the cellphone  [de-identified] : goes with wife [de-identified] : wife cooks  [de-identified] : does laundry on his own - walks down to basement  [FreeTextEntry6] : stopped driving in 2015 s/p CVA [FreeTextEntry7] : wife manages pills, pt does the insulin  [de-identified] : manages finances  [de-identified] : rollator  [de-identified] : shower chair doesn't fit  [de-identified] : stopped driving in 2015 s/p CVA [[id number]] : 0 [AdvancecareDate] : 12/7/21  [FreeTextEntry4] : Wife states she is HCP, ?son alternate\par

## 2021-12-07 NOTE — REASON FOR VISIT
[Initial Evaluation] : an initial evaluation [Spouse] : spouse [FreeTextEntry1] : dementia, adc program  [FreeTextEntry3] : boaz Collier [phone] [FreeTextEntry2] : who assists with history d/t pt limited historian d/t baseline memory loss

## 2022-01-01 ENCOUNTER — APPOINTMENT (OUTPATIENT)
Dept: ENDOCRINOLOGY | Facility: CLINIC | Age: 77
End: 2022-01-01

## 2022-01-01 ENCOUNTER — APPOINTMENT (OUTPATIENT)
Dept: NEUROLOGY | Facility: CLINIC | Age: 77
End: 2022-01-01

## 2022-01-01 ENCOUNTER — APPOINTMENT (OUTPATIENT)
Dept: GERIATRICS | Facility: CLINIC | Age: 77
End: 2022-01-01

## 2022-01-01 VITALS
HEART RATE: 65 BPM | OXYGEN SATURATION: 99 % | DIASTOLIC BLOOD PRESSURE: 67 MMHG | WEIGHT: 142 LBS | BODY MASS INDEX: 21.52 KG/M2 | HEIGHT: 68 IN | SYSTOLIC BLOOD PRESSURE: 115 MMHG

## 2022-01-01 VITALS — SYSTOLIC BLOOD PRESSURE: 170 MMHG | HEIGHT: 70 IN | DIASTOLIC BLOOD PRESSURE: 80 MMHG | HEART RATE: 65 BPM

## 2022-01-01 VITALS
DIASTOLIC BLOOD PRESSURE: 80 MMHG | TEMPERATURE: 97.7 F | RESPIRATION RATE: 15 BRPM | HEART RATE: 66 BPM | OXYGEN SATURATION: 99 % | WEIGHT: 142 LBS | SYSTOLIC BLOOD PRESSURE: 130 MMHG | BODY MASS INDEX: 21.59 KG/M2
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
## 2022-01-01 PROCEDURE — 99215 OFFICE O/P EST HI 40 MIN: CPT

## 2022-01-01 PROCEDURE — 99214 OFFICE O/P EST MOD 30 MIN: CPT

## 2022-01-01 RX ORDER — ATORVASTATIN CALCIUM 10 MG/1
10 TABLET, FILM COATED ORAL
Refills: 0 | Status: ACTIVE | COMMUNITY
Start: 2021-04-21

## 2022-01-12 ENCOUNTER — APPOINTMENT (OUTPATIENT)
Dept: ENDOCRINOLOGY | Facility: CLINIC | Age: 77
End: 2022-01-12
Payer: MEDICARE

## 2022-01-12 VITALS
HEIGHT: 69 IN | DIASTOLIC BLOOD PRESSURE: 80 MMHG | WEIGHT: 148 LBS | SYSTOLIC BLOOD PRESSURE: 126 MMHG | OXYGEN SATURATION: 99 % | BODY MASS INDEX: 21.92 KG/M2 | HEART RATE: 68 BPM | TEMPERATURE: 208.4 F

## 2022-01-12 LAB
GLUCOSE BLDC GLUCOMTR-MCNC: 204
HBA1C MFR BLD HPLC: 6.3

## 2022-01-12 PROCEDURE — 99214 OFFICE O/P EST MOD 30 MIN: CPT | Mod: 25

## 2022-01-12 PROCEDURE — 82962 GLUCOSE BLOOD TEST: CPT

## 2022-01-12 PROCEDURE — 83036 HEMOGLOBIN GLYCOSYLATED A1C: CPT | Mod: QW

## 2022-01-12 NOTE — ASSESSMENT
[Diabetic Medications] : Risks and benefits of diabetic medications were discussed [Levothyroxine] : The patient was instructed to take Levothyroxine on an empty stomach, separate from vitamins, and wait at least 30 minutes before eating [FreeTextEntry1] : 77 y/o male with: new diagnosis Demential Lewy Body type\par \par 1. T2DM: Pt appears to be tightly controlled on NovoLog 6/3 and Lantus 20 units.   . No further hypos. Normal monofilament test. 8/2021 A1c 6.4%.\par \par Goals of diabetes care was reviewed previously. Looser targets in older patients reviewed.  Risk of hypoglycemia based on age and renal function reviewed patient has lost weight over the last year.  I have recommended decreasing insulin to reduce hypoglycemia risk.  Recommend reduce prebreakfast NovoLog to 3 units continue dinnertime 3 units and reduce Lantus by 2 units to 18 units.  Patient should call in 2 weeks and report blood sugars.  Recommend pt skip Novolog 2 units at lunch and see if there is any difference in BS. I discussed the importance of diet management and exercise as weight loss will help manage diabetes. If there are any hypos/hypers, pt should call me or Akanksha.\par \par Treatment of Diabetes in Older Adults: An Endocrine Society* Clinical Practice Guideline Tom Milan  et al. The Journal of Clinical Endocrinology & Metabolism, Volume 104, Issue 5, May 2019, Pages 1524–1570\par \par 2. Hypothyroidism. Clinically euthyroid on LT4 75 mcg daily. No local neck pain. No dysphagia or dysphonia. No raciness, shakiness, heat/cold intolerance, tiredness, or fatigue. No palpitations, tremors, or sudden weight gain/loss. Repeat TFT.\par \par 3. HLD well controlled on current medication.\par \par F/u in 4 months

## 2022-01-12 NOTE — HISTORY OF PRESENT ILLNESS
[FreeTextEntry1] : New diagnosis early dementia, Lewy Body,  no Rx yet\par Had recent shingles left flank \par Type 2 DM: Tightly controlled on NovoLog 6 units at breakfast and 3 units at dinner.  And Lantus 20 units.  Stopped prelunch insulin.  Patient submitted occasional fingersticks.  Average blood sugar 98.  No evidence of hypoglycemia.  Most  values 70s  to low 100s range.\par No further hypos. Patient denies any polyuria or polydipsia. Saw podiatry within past 3 months. Last ophtho within past 6 months.\par \par Hypothyroidism: on LT4 75 mcg daily. No local neck pain. No dysphagia or dysphonia. No raciness, shakiness, heat/cold intolerance, tiredness, or fatigue. No palpitations, tremors, or sudden weight gain/loss.\par \par Previously saw Dr. Toussaint from renal for diabetic nephropathy. Prior Cre: 2.19, was allergic to ACE, unclear if ever on ARB. H/o uric acid kidney stones on Allopurinol. No recurrence. Renal US 2.4 cm R renal mass, ordered MRI to r/o carcinoma.\par  \par Dx with prostate CA in 2018. Began RT May 2019.\par \par Added extended release carbidopa/levodopa for Parkinsons.

## 2022-01-12 NOTE — PHYSICAL EXAM
[Alert] : alert [Well Nourished] : well nourished [No Acute Distress] : no acute distress [Well Developed] : well developed [Normal Sclera/Conjunctiva] : normal sclera/conjunctiva [EOMI] : extra ocular movement intact [No Proptosis] : no proptosis [Thyroid Not Enlarged] : the thyroid was not enlarged [No Thyroid Nodules] : no palpable thyroid nodules [Clear to Auscultation] : lungs were clear to auscultation bilaterally [Normal S1, S2] : normal S1 and S2 [Normal Rate] : heart rate was normal [Regular Rhythm] : with a regular rhythm [No Edema] : no peripheral edema [Normal Bowel Sounds] : normal bowel sounds [Not Tender] : non-tender [Not Distended] : not distended [Soft] : abdomen soft [Normal Anterior Cervical Nodes] : no anterior cervical lymphadenopathy [No Spinal Tenderness] : no spinal tenderness [Spine Straight] : spine straight [No Stigmata of Cushings Syndrome] : no stigmata of Cushings Syndrome [Oriented x3] : oriented to person, place, and time [No Lid Lag] : no lid lag [de-identified] : decreased pedal pulses [de-identified] : Slow gait [de-identified] : skin intact

## 2022-01-25 ENCOUNTER — APPOINTMENT (OUTPATIENT)
Dept: NEUROLOGY | Facility: CLINIC | Age: 77
End: 2022-01-25
Payer: MEDICARE

## 2022-01-25 VITALS
WEIGHT: 145 LBS | RESPIRATION RATE: 15 BRPM | BODY MASS INDEX: 21.48 KG/M2 | SYSTOLIC BLOOD PRESSURE: 177 MMHG | HEART RATE: 62 BPM | HEIGHT: 69 IN | DIASTOLIC BLOOD PRESSURE: 84 MMHG

## 2022-01-25 PROCEDURE — 99214 OFFICE O/P EST MOD 30 MIN: CPT

## 2022-01-25 NOTE — DISCUSSION/SUMMARY
[FreeTextEntry1] : Lewy body disease with motoric stability. May have subclinical wearing offs c/b gut dysmotility\par OH managed with midodrine\par \par Patient was counseled on the following recommendations:\par \par leave LD regimen the same\par take miralax qd or switch to senna 1-2 tabs qhs\par cont midodrine 2,5mg qd\par cont PT and increase home exercises\par \par f/u 3-4months

## 2022-01-25 NOTE — PHYSICAL EXAM
[FreeTextEntry1] : \par There is facial masking. Hypophonic. Tremor is absent. There is 2+ rigidity in his neck. 2+ cogwheeling in his limbs. Eric 2+ distal >proximal, R>L. There is no ataxia. Walks with better SL and turns. Right armswing is depressed. No FOG.

## 2022-01-25 NOTE — HISTORY OF PRESENT ILLNESS
[FreeTextEntry1] : Patient reports that he has occasional well formed hallucinations of seeing raccoons. He retains insight into these VH\par He reports that he experiences kick in effect of his LD doses: notes improved level of alertness\par He has not fallen\par He does all ADls independently\par Gets home PT\par Has word finding difficulties at times but memory is ok per his wife\par \par + constipation problematic. Takes miralax sporadically\par sleep is good. RBD rare\par \par PMH HTN, CAD s/p PCI, DM, CKD\par \par PD meds\par sinemet  1 tab QID\par sinemet ER  1 tab qhs\par midodrine 2,5mg qd

## 2022-02-03 ENCOUNTER — APPOINTMENT (OUTPATIENT)
Dept: GERIATRICS | Facility: CLINIC | Age: 77
End: 2022-02-03
Payer: MEDICARE

## 2022-02-03 VITALS
SYSTOLIC BLOOD PRESSURE: 144 MMHG | DIASTOLIC BLOOD PRESSURE: 70 MMHG | WEIGHT: 148 LBS | OXYGEN SATURATION: 99 % | HEIGHT: 70.8 IN | BODY MASS INDEX: 20.72 KG/M2 | RESPIRATION RATE: 16 BRPM | HEART RATE: 67 BPM

## 2022-02-03 DIAGNOSIS — Z91.81 HISTORY OF FALLING: ICD-10-CM

## 2022-02-03 DIAGNOSIS — Z13.31 ENCOUNTER FOR SCREENING FOR DEPRESSION: ICD-10-CM

## 2022-02-03 PROCEDURE — 99354: CPT

## 2022-02-03 PROCEDURE — 99483 ASSMT & CARE PLN PT COG IMP: CPT

## 2022-02-03 PROCEDURE — 99355: CPT

## 2022-02-11 PROBLEM — Z91.81 HISTORY OF FALL: Status: ACTIVE | Noted: 2021-12-07

## 2022-02-14 ENCOUNTER — NON-APPOINTMENT (OUTPATIENT)
Age: 77
End: 2022-02-14

## 2022-02-17 ENCOUNTER — APPOINTMENT (OUTPATIENT)
Dept: SLEEP CENTER | Facility: CLINIC | Age: 77
End: 2022-02-17

## 2022-02-24 ENCOUNTER — APPOINTMENT (OUTPATIENT)
Dept: GERIATRICS | Facility: CLINIC | Age: 77
End: 2022-02-24
Payer: MEDICARE

## 2022-02-24 PROCEDURE — 99442: CPT | Mod: 95

## 2022-03-22 ENCOUNTER — APPOINTMENT (OUTPATIENT)
Dept: NEUROLOGY | Facility: CLINIC | Age: 77
End: 2022-03-22
Payer: MEDICARE

## 2022-03-22 VITALS
SYSTOLIC BLOOD PRESSURE: 155 MMHG | HEART RATE: 62 BPM | BODY MASS INDEX: 20.9 KG/M2 | WEIGHT: 146 LBS | DIASTOLIC BLOOD PRESSURE: 73 MMHG | HEIGHT: 70 IN

## 2022-03-22 PROCEDURE — 99215 OFFICE O/P EST HI 40 MIN: CPT

## 2022-03-22 RX ORDER — INSULIN ASPART 100 [IU]/ML
INJECTION, SOLUTION INTRAVENOUS; SUBCUTANEOUS
Refills: 0 | Status: ACTIVE | COMMUNITY

## 2022-03-24 ENCOUNTER — APPOINTMENT (OUTPATIENT)
Dept: NEPHROLOGY | Facility: CLINIC | Age: 77
End: 2022-03-24
Payer: MEDICARE

## 2022-03-24 VITALS
OXYGEN SATURATION: 99 % | BODY MASS INDEX: 21.33 KG/M2 | DIASTOLIC BLOOD PRESSURE: 78 MMHG | TEMPERATURE: 97.8 F | HEIGHT: 70 IN | WEIGHT: 149 LBS | SYSTOLIC BLOOD PRESSURE: 162 MMHG | HEART RATE: 62 BPM

## 2022-03-24 PROCEDURE — 99214 OFFICE O/P EST MOD 30 MIN: CPT

## 2022-03-25 LAB
ALBUMIN SERPL ELPH-MCNC: 4.3 G/DL
ALP BLD-CCNC: 129 U/L
ALT SERPL-CCNC: <5 U/L
ANION GAP SERPL CALC-SCNC: 10 MMOL/L
AST SERPL-CCNC: 8 U/L
BASOPHILS # BLD AUTO: 0.04 K/UL
BASOPHILS NFR BLD AUTO: 0.4 %
BILIRUB SERPL-MCNC: 0.3 MG/DL
BUN SERPL-MCNC: 52 MG/DL
CALCIUM SERPL-MCNC: 9 MG/DL
CHLORIDE SERPL-SCNC: 103 MMOL/L
CO2 SERPL-SCNC: 27 MMOL/L
CREAT SERPL-MCNC: 2.25 MG/DL
EGFR: 30 ML/MIN/1.73M2
EOSINOPHIL # BLD AUTO: 0.29 K/UL
EOSINOPHIL NFR BLD AUTO: 3.2 %
GLUCOSE SERPL-MCNC: 182 MG/DL
HCT VFR BLD CALC: 36.2 %
HGB BLD-MCNC: 11.7 G/DL
IMM GRANULOCYTES NFR BLD AUTO: 0.3 %
LYMPHOCYTES # BLD AUTO: 1 K/UL
LYMPHOCYTES NFR BLD AUTO: 11 %
MAN DIFF?: NORMAL
MCHC RBC-ENTMCNC: 29.4 PG
MCHC RBC-ENTMCNC: 32.3 GM/DL
MCV RBC AUTO: 91 FL
MONOCYTES # BLD AUTO: 0.7 K/UL
MONOCYTES NFR BLD AUTO: 7.7 %
NEUTROPHILS # BLD AUTO: 7 K/UL
NEUTROPHILS NFR BLD AUTO: 77.4 %
PLATELET # BLD AUTO: 145 K/UL
POTASSIUM SERPL-SCNC: 5 MMOL/L
PROT SERPL-MCNC: 6.6 G/DL
RBC # BLD: 3.98 M/UL
RBC # FLD: 13.3 %
SODIUM SERPL-SCNC: 140 MMOL/L
WBC # FLD AUTO: 9.06 K/UL

## 2022-03-25 NOTE — HISTORY OF PRESENT ILLNESS
[FreeTextEntry1] : 67 year old man hx DM, Htn, nosebleeds, nephrolithiasis, CAD, stent referred for CKD and albuminuria.\par \par Pt has had DM for many years with geenrally good control. No use of NSAIDS, no heavy metals, toxins, no skin rash, no joint pains.\par \par Lost weight\par Feels well\par Has dx Lewy body dementia\par

## 2022-03-25 NOTE — ASSESSMENT
[FreeTextEntry1] : 67 year old man hx DM, Htn, nosebleeds, nephrolithiasis, CAD, stent referred for CKD and albuminuria.\par Pt has had DM for many years with generally good control. No use of NSAIDS, no heavy metals, toxins, no skin rash, no joint pains.\par Probable diabetic nephropathy. \par \par Lost weight\par Feels well\par Has dx Lewy body dementia\par \par CKD- SCr generally stable\par BP high today\par so lower midodrine to every other day- wife will call w BP in 2 weeks\par The total time of preparation for this visit, the visit itself and writing the note was 31+ minutes\par \par \par

## 2022-04-12 ENCOUNTER — APPOINTMENT (OUTPATIENT)
Dept: ENDOCRINOLOGY | Facility: CLINIC | Age: 77
End: 2022-04-12
Payer: MEDICARE

## 2022-04-12 VITALS — BODY MASS INDEX: 21.38 KG/M2 | WEIGHT: 149 LBS

## 2022-04-12 LAB
GLUCOSE BLDC GLUCOMTR-MCNC: 177
HBA1C MFR BLD HPLC: 6.8

## 2022-04-12 PROCEDURE — G0108 DIAB MANAGE TRN  PER INDIV: CPT

## 2022-04-12 PROCEDURE — 82962 GLUCOSE BLOOD TEST: CPT

## 2022-04-12 PROCEDURE — 83036 HEMOGLOBIN GLYCOSYLATED A1C: CPT | Mod: QW

## 2022-06-02 ENCOUNTER — APPOINTMENT (OUTPATIENT)
Dept: GERIATRICS | Facility: CLINIC | Age: 77
End: 2022-06-02
Payer: MEDICARE

## 2022-06-02 VITALS
OXYGEN SATURATION: 99 % | BODY MASS INDEX: 20.96 KG/M2 | DIASTOLIC BLOOD PRESSURE: 62 MMHG | TEMPERATURE: 97.3 F | RESPIRATION RATE: 16 BRPM | HEART RATE: 63 BPM | HEIGHT: 70 IN | WEIGHT: 146.38 LBS | SYSTOLIC BLOOD PRESSURE: 110 MMHG

## 2022-06-02 PROCEDURE — 99214 OFFICE O/P EST MOD 30 MIN: CPT

## 2022-06-02 NOTE — HISTORY OF PRESENT ILLNESS
[One fall no injury in past year] : Patient reported one fall in the past year without injury [Patient is independent with] : bathing [Independent] : housekeeping [Full assistance needed] : Assistance needed managing medications [] : Assistance needed managing finances. [FAST Score: ____] : Functional Assessment Scale (FAST) Score: [unfilled] [Smoke Detector] : smoke detector [Carbon Monoxide Detector] : carbon monoxide detector [Stair Lift] : stair lift used in home [Grab Bars] : grab bars [Night Light] : night light [Mild] : Stage: Mild [Stable] : Status: Stable [Designated Healthcare Proxy] : Designated healthcare proxy [Name: ___] : Health Care Proxy's Name: [unfilled]  [I will adhere to the patient's wishes.] : I will adhere to the patient's wishes. [FreeTextEntry1] : ADC Program ID:[id number]\par \par Mr. CAMILA PAYNE is a 76 yoM with PMHx of DLB, was referred to the Alzheimer's and Dementia Program by MD Golden and MD Yang for comanagement of dementia-related issues and coordination of dementia care. Pt. presents with Amira,spouse who assisted in hx intake due to pt.'s dementia. \par \par \par #dementia\par -PT ended\par -stable, no behaviors\par -sleeping well \par \par #constipation\par -stable\par -drinking a lot more water \par -using Miralaxx PRN \par \par \par #caregiver burden \par -having a lot of back pain\par -needs MRI of right hip \par -reported stress with walking long distances\par -completed parking pass form today\par \par \par Plan\par -speech therapy rx and contact to [facility]\par -HCP needed\par -pending NPI-Q and MCSI \par \par \par ACD acuity YELLOW, caregiver illness. F/u in 2 months. \par \par Denies pain. Denies acute visits/falls. Denies HA/dizziness, SOB/CP, abdominal pain, dysuria, reports daily BMs regular. \par \par \par (2/3/22)\par #dementia\par -appreciated Dr. Golden note 12/7/21 pt. sleeping well, wakes 2x to void  \par -appreciated Dr. Yang note 6/22/2, PET c/w DLB and 6/14/21 cog symptoms began 2014 MoCA 16/30 mis 9/15 (11/21/21), mild stage\par -MRI 7/6/21 volume loss appropriate for age \par -completed PT and OT\par \par Today,\par "undiagnosed learning disability"\par -pt. stated memory issues "8-10 years ago"\par -"problems with executive function, can do very basic reading"\par -urinary incontinence, radiation due to prostate ca\par -on sinemet rx by Dr. Tomlin, pt. reports a slight improvement on the med\par -OT finished, [facility] comes to the house w/ Rosi \par - PT just statred again, good for 3 months. \par -has 4GK \par -weight loss, 20lbs during COVID, follows PCP, no acute findings \par -weight is now stable \par -"some nights I sleep 8-9 hours. Some nights if I have a doctors appt. I get worried I might miss the appt. and wake up" \par -pt. redirects some questions to spouse, when he does not understand\par -occasional dizziness\par -occasional cough with swallowing \par \par #hypotension\par -on midodrine 2.5mg daily \par \par #constipation \par -"big problem" \par -taking miralax and took prunes and explosion of diarrhea 2weeks ago 1/15/22 "I almost brougth him to the hospital"  \par -stopped miralax daily and then constipated again now back on miralax daily, less than half the cap (12g) \par \par \par #eczema \par -topical steriod \par \par #fall \par -12/2021\par -advised to have PT practice the stairs up front \par \par #hospitalizations hx\par -7/19-7/24/19 dizziness/hypotension, IVF and midodrine \par - SBO 2015 w/ feeding tube \par \par #caregiver burden \par -wife retired 2017, book keeper, MS in education \par -wife hospitalized 2020, son cared for pt. \par -up during night, plays "wordle"\par \par #HCM\par -PCP Dr. Kartik Geronimo at [facility] last seen 8/2021, f/u 1/2022, stable \par \par \par Denies pain. Denies acute visits/falls. Denies HA/dizziness, SOB/CP, abdominal pain, dysuria, reports daily BMs regular. \par \par "drinks 1x 16oz and 1 seltzer" counseled to increase water intake \par \par Goal\par -OT in the spring \par -HCP needs to provide \par -contact \par -is a ;[facility]  (sent an OT that installed grab bars and raised toilet) NP Bryant Hood \par -occasional anxiety:calm micah\par \par *pending NPI-Q and MCSI*\par \par CAREGIVER 1: \par Name/Relationship: Amira,wife\par Involvement in care:\par Mailing Address:\par Phone Number:[phone]\par E-mail:\par Best time to reach this person: \par Patient permission to contact this person:\par \par CAREGIVER 2: \par Name/Relationship:Germán,son \par Involvement in care:[place], visits 1x/week \par Mailing Address:\par Phone Number:\par E-mail:\par Best time to reach this person: \par Patient permission to contact this person:\par \par CAREGIVER 3: \par Name/Relationship:Leticia, daughter  \par Involvement in care:[place], visits 1x/week \par Mailing Address:\par Phone Number:\par E-mail:\par Best time to reach this person: \par Patient permission to contact this person:\par \par Primary Care Physician:PCP Dr. Kartik Geronimo at [facility]\par Previous Physicians:\par Neurologists:Dr. Yang\par \par \par Suspect Medications (associated with mental status changes): \par Recent hospitalization/ ED Visits/ Nursing Home Stays:\par \par Social History:\par Primary Language:English\par Marital Status:\par Children:2\par Education:MS\par Occupation:accounting \par Activity/Exercise:\par Alcohol:no\par Sexually active: \par Driving Habits:no\par Firearms:no\par \par Living Situation:\par Housing (stairs/levels): 12 steps to the basements, "has to do the wash" \par Length at Residence:32 years \par Lives with:spouse \par Caregivers (non-paid and paid):no, just got a cleaning lady 2 weeks ago. \par Safety Concerns: none \par Wandering:no \par Falls:12/2021 on the step, by himself, "tripped"\par Fear of Falling: no\par \par Financial Situation:SS, pension, no concerns elder  (over the summer), POA\par \par Advance Directives:\par HCP/Advance Directives/Living will:wife? no documents, will provided. PAD and HCP and pelayo that need to be updated \par HCP/Alternate Decision Maker: advise to meet elder law   \par MOLST: educated on MOLST form\par What matters most? \par "TV, law and order" \par is [facility]  [Guns at Home] : no guns at home [Driving Concerns] : not driving or driving without noted concerns [FreeTextEntry8] : gets to bathroom in time  [de-identified] : 7 [de-identified] : with spouse  [de-identified] : "makes scrambled egg with a bagel and hot sauce" counseled on cooking safety. Advised cameras, now has.  [de-identified] : counseled on fall safety  [FreeTextEntry6] : spouse [FreeTextEntry7] : needs assitance. spouse prepares the med box, "forgets to take insulin or meds" supervision  [de-identified] : 6 [de-identified] : has a rollator, " I can't pick him up". counseled pt. cannot be a lone  [de-identified] : uses sock kit  [CornellScale] : 4 2/3/2022 [de-identified] : pending NPI-! and MCSI [AdvancecareDate] : 6/2/22 [FreeTextEntry4] : Advance Directives:\par HCP/Advance Directives/Living will:wife? no documents, will provided. PAD and HCP and pelayo that need to be updated \par HCP/Alternate Decision Maker: advise to meet elder law   \par MOLST: educated on MOLST form\par What matters most? \par "TV, law and order" \par is [facility]

## 2022-06-02 NOTE — REASON FOR VISIT
[Follow-Up] : a follow-up visit [FreeTextEntry1] : was referred to the Alzheimer's and Dementia Program by MD Golden for comanagement of dementia-related issues and coordination of dementia care.

## 2022-06-02 NOTE — SOCIAL HISTORY
[With spouse] : lives with spouse [FreeTextEntry1] : Amira  [FreeTextEntry4] : spouse [CaregiverAge] : 70s [CaregiverPHQ-9Score] : 2 [FreeTextEntry3] : pending

## 2022-06-02 NOTE — ASSESSMENT
[FreeTextEntry1] : *pending NPI-Q and MCSI*\par \par \par Social Care Plan\par 1. Respite: \par \par -Provided hotline contact to Alzheimer's Association [phone].\par -[facility]\par -Provided link to www. communityresourcefinder.org\par -provided [facility] agency \par -task sent to LACIE An.\par \par \par 2. Caregiver Education:\par Sent e-mail to -\par \par I wanted to share useful tools to help manage your family member's dementia behaviors. I have included caregiver training videos from [facility] Alzheimer's and Dementia Care Program to help guide you and caretakers, as well as a daily care plan from the Alzheimer's Association. Maintaining a schedule and a structured day helps patients with dementia. I am available if you need any further assistance or have any questions. See below:\par 1.depression/apathy.\par 2. Alzheimer's Association Daily Care Plan:\par  https://www.alz.org/help-support/caregiving/daily-care/daily-care-plan\par 3. Educated spouse when  behaviors occur to acknowledge patient's emotions and redirect behavior with distractions, address physical needs. Provided examples.\par 4. Provided Alzheimer's website. Educated spouse about support groups in [place].\par \par Look through books, picture albums,, painting, puzzles, listen to music, daily chores.\par \par 5. Alzheimer's Association Community Resource Finder \par    www.alz.org/nca/helping you/community-resource-finder \par 6. Lewy Body Dementia Symptoms and Causes [email or web address] \par 7. LBDA non-profit organization contact\par 8. compassionate allowance program part of SSA \par \par 3. [facility]: [phone] \par [email or web address].org/caregiver-yoga-series/\par \par 3. Caregiver support:\par -met with SW previously \par \par \par 4. Safety: \par -counseled pt. must be accompanied 24/7. Counseled pt. is a high fall risk. Continue will fall safety precautions. \par -medical alert bracelet\par \par 5. CG stress:\par -Counseled on non-pharmacological interventions for stress which include medication micah (Calm), daily physical activity. \par \par 6. Alz Association Support Groups PDF\par \par \par 7. [facility] education folder attached PFD.\par \par \par \par -ADC acuity YELLOW, f/u in 2 months.\par -available for urgent visits and as needed by phone. \par -direct patient time 10-10:35pm\par \par  ROBBIN Carmona-BC

## 2022-06-02 NOTE — REVIEW OF SYSTEMS
[As Noted in HPI] : as noted in HPI [Feeling Poorly] : not feeling poorly [Feeling Tired] : not feeling tired [Eyesight Problems] : no eyesight problems [Discharge From Eyes] : no purulent discharge from the eyes [Nosebleeds] : no nosebleeds [Nasal Discharge] : no nasal discharge [Chest Pain] : no chest pain [Palpitations] : no palpitations [Anxiety] : no anxiety [Depression] : no depression

## 2022-06-02 NOTE — PHYSICAL EXAM
[Alert] : alert [Sclera] : the sclera and conjunctiva were normal [PERRL] : pupils were equal in size, round, and reactive to light [Normal Oral Mucosa] : normal oral mucosa [No Oral Pallor] : no oral pallor [No Oral Cyanosis] : no oral cyanosis [Oropharynx] : the oropharynx was normal [Normal Appearance] : the appearance of the neck was normal [No Neck Mass] : no neck mass was observed [No Respiratory Distress] : no respiratory distress [No Acc Muscle Use] : no accessory muscle use [Respiration, Rhythm And Depth] : normal respiratory rhythm and effort [Auscultation Breath Sounds / Voice Sounds] : lungs were clear to auscultation bilaterally [Normal PMI] : the apical impulse was abnormal [Normal S1, S2] : normal S1 and S2 [Murmurs] : no murmurs [Heart Rate And Rhythm] : heart rate was normal and rhythm regular [Edema] : edema was not present [Bowel Sounds] : normal bowel sounds [Abdomen Tenderness] : non-tender [Abdomen Soft] : soft [Cervical Lymph Nodes Enlarged Posterior Bilaterally] : posterior cervical [Supraclavicular Lymph Nodes Enlarged Bilaterally] : supraclavicular [Cervical Lymph Nodes Enlarged Anterior Bilaterally] : anterior cervical, supraclavicular [Axillary Lymph Nodes Enlarged Bilaterally] : axillary [No CVA Tenderness] : no CVA  tenderness [No Spinal Tenderness] : no spinal tenderness [Normal Color / Pigmentation] : normal skin color and pigmentation [No Focal Deficits] : no focal deficits [Normal Affect] : the affect was normal [Normal Mood] : the mood was normal [de-identified] : elderly male, calm, soft spoken, smiling today [de-identified] : rounded [de-identified] : ataxic wide based gait  [de-identified] : RLL abdomen small white lesion 1x1x1 circular elevated, follows with derm [de-identified] : calm

## 2022-06-13 ENCOUNTER — NON-APPOINTMENT (OUTPATIENT)
Age: 77
End: 2022-06-13

## 2022-06-30 ENCOUNTER — APPOINTMENT (OUTPATIENT)
Dept: NEUROLOGY | Facility: CLINIC | Age: 77
End: 2022-06-30

## 2022-06-30 VITALS
HEART RATE: 60 BPM | TEMPERATURE: 97.9 F | HEIGHT: 70 IN | SYSTOLIC BLOOD PRESSURE: 129 MMHG | DIASTOLIC BLOOD PRESSURE: 70 MMHG | BODY MASS INDEX: 20.62 KG/M2 | WEIGHT: 144 LBS

## 2022-06-30 PROCEDURE — 99214 OFFICE O/P EST MOD 30 MIN: CPT

## 2022-06-30 RX ORDER — CARBIDOPA AND LEVODOPA 25; 100 MG/1; MG/1
25-100 TABLET, EXTENDED RELEASE ORAL
Qty: 90 | Refills: 3 | Status: ACTIVE | OUTPATIENT
Start: 2021-03-09

## 2022-06-30 RX ORDER — CARBIDOPA AND LEVODOPA 25; 100 MG/1; MG/1
25-100 TABLET ORAL 4 TIMES DAILY
Qty: 360 | Refills: 3 | Status: ACTIVE | OUTPATIENT
Start: 2021-03-26

## 2022-06-30 NOTE — DISCUSSION/SUMMARY
[FreeTextEntry1] : PDD who is motorically and cognitively stable. \par Refractory constipation\par Mild dysphagia \par \par Patient was counseled on the following recommendations:\par \par -- leave LD regimen the same\par - f/u GI for constipation management\par - refer for swallow evaluation\par - increase weekly exercising\par \par f.u 3-4 months

## 2022-07-01 ENCOUNTER — NON-APPOINTMENT (OUTPATIENT)
Age: 77
End: 2022-07-01

## 2022-07-21 ENCOUNTER — APPOINTMENT (OUTPATIENT)
Dept: NEPHROLOGY | Facility: CLINIC | Age: 77
End: 2022-07-21

## 2022-07-21 VITALS
HEIGHT: 70 IN | HEART RATE: 64 BPM | DIASTOLIC BLOOD PRESSURE: 73 MMHG | SYSTOLIC BLOOD PRESSURE: 131 MMHG | WEIGHT: 146 LBS | OXYGEN SATURATION: 100 % | TEMPERATURE: 97.1 F | BODY MASS INDEX: 20.9 KG/M2

## 2022-07-21 PROCEDURE — 99214 OFFICE O/P EST MOD 30 MIN: CPT

## 2022-07-21 NOTE — ASSESSMENT
[FreeTextEntry1] : 67 year old man hx DM, Htn, nosebleeds, nephrolithiasis, CAD, stent referred for CKD and albuminuria.\par Pt has had DM for many years with generally good control. No use of NSAIDS, no heavy metals, toxins, no skin rash, no joint pains.\par Probable diabetic nephropathy. \par \par Lost weight\par Feels well\par Has dx Lewy body dementia\par \par Constipation improving\par nosebleed two nights ago\par   better after 15 min\par Appetite good\par CKD\par   last month SCr kim to 2.58\par   then fell back to 2.1\par   BP excellent\par The total time of preparation for this visit, the visit itself and writing the note was 30 minutes\par \par \par \par

## 2022-07-21 NOTE — HISTORY OF PRESENT ILLNESS
[FreeTextEntry1] : 67 year old man hx DM, Htn, nosebleeds, nephrolithiasis, CAD, stent referred for CKD and albuminuria.\par \par Pt has had DM for many years with geenrally good control. No use of NSAIDS, no heavy metals, toxins, no skin rash, no joint pains.\par \par Constipation improving\par nosebleed two nights ago\par   better after 15 min\par Appetite good

## 2022-07-26 ENCOUNTER — APPOINTMENT (OUTPATIENT)
Dept: ENDOCRINOLOGY | Facility: CLINIC | Age: 77
End: 2022-07-26

## 2022-07-26 VITALS
SYSTOLIC BLOOD PRESSURE: 124 MMHG | TEMPERATURE: 97.2 F | DIASTOLIC BLOOD PRESSURE: 78 MMHG | HEIGHT: 70 IN | HEART RATE: 65 BPM | OXYGEN SATURATION: 99 % | BODY MASS INDEX: 20.9 KG/M2 | WEIGHT: 146 LBS

## 2022-07-26 PROCEDURE — 99214 OFFICE O/P EST MOD 30 MIN: CPT

## 2022-07-27 NOTE — ASSESSMENT
[Diabetic Medications] : Risks and benefits of diabetic medications were discussed [Levothyroxine] : The patient was instructed to take Levothyroxine on an empty stomach, separate from vitamins, and wait at least 30 minutes before eating [FreeTextEntry1] : 78 y/o male with: DM Type 2, hypothyroidism. Demential Lewy Body type\par \par 1. T2DM: Pt appears to be tightly controlled on NovoLog 3/3 and Lantus 18 units.   . No further hypos. Normal monofilament test. Pt reports  recent A1c 6.8%\par \par Pt taking NovoLog  3 units continue dinnertime 3 units and  Lantus  18 units.   \par \par Treatment of Diabetes in Older Adults: An Endocrine Society* Clinical Practice Guideline Tom Milan  et al. The Journal of Clinical Endocrinology & Metabolism, Volume 104, Issue 5, May 2019, Pages 1528–1575\par \par 2. Hypothyroidism. Clinically euthyroid on LT4 75 mcg daily. No local neck pain. No dysphagia or dysphonia. No raciness, shakiness, heat/cold intolerance, tiredness, or fatigue. No palpitations, tremors, or sudden weight gain/loss. Repeat TFT.\par \par 3. HLD well controlled on current medication.\par \par Physical examination: skin in tact, decrease in pulses\par \par Labs June 2022\par Creatinine 2.81 Repeat 2.1\par TSH 1.52 \par Had add'l labs with Dr Geronimo, wife reports A1c 6.8\par F/u in 4 months

## 2022-07-27 NOTE — HISTORY OF PRESENT ILLNESS
[FreeTextEntry1] : Patient returns for a follow up visit for DM Type 2 and hypothyroidism. \par \par Type 2 DM: Tightly controlled on NovoLog 3 units at breakfast and 3 units at dinner.  And Lantus 18 units.  Stopped prelunch insulin.  Patient submitted occasional fingersticks.  Average blood sugar 98.  No evidence of hypoglycemia.  Most  values 100s to 150  range.\par No further hypos. Patient denies any polyuria or polydipsia. . Last ophtho within the last year. .\par \par Hypothyroidism: on LT4 75 mcg daily. No local neck pain. No dysphagia or dysphonia. No raciness, shakiness, heat/cold intolerance, tiredness, or fatigue. No palpitations, tremors, or sudden weight gain/loss.\par \par Other Hx;\par Pt has been having constant constipation. Pt is now taking MiraLAX twice a day. Early dementia, Lewy Body,  no Rx yet\par Had  shingles left flank \par \par Previously saw Dr. Toussaint from renal for diabetic nephropathy. Prior Cre: 2.19, was allergic to ACE, unclear if ever on ARB. H/o uric acid kidney stones on Allopurinol. No recurrence. Renal US 2.4 cm R renal mass, ordered MRI to r/o carcinoma.\par  \par Dx with prostate CA in 2018. Began RT May 2019.\par \par Added extended release carbidopa/levodopa for Parkinsons.

## 2022-07-27 NOTE — PHYSICAL EXAM
[Alert] : alert [Well Nourished] : well nourished [No Acute Distress] : no acute distress [Well Developed] : well developed [Normal Sclera/Conjunctiva] : normal sclera/conjunctiva [EOMI] : extra ocular movement intact [No Proptosis] : no proptosis [No Lid Lag] : no lid lag [Thyroid Not Enlarged] : the thyroid was not enlarged [No Thyroid Nodules] : no palpable thyroid nodules [Clear to Auscultation] : lungs were clear to auscultation bilaterally [Normal S1, S2] : normal S1 and S2 [Normal Rate] : heart rate was normal [Regular Rhythm] : with a regular rhythm [No Edema] : no peripheral edema [Normal Bowel Sounds] : normal bowel sounds [Not Tender] : non-tender [Not Distended] : not distended [Soft] : abdomen soft [Normal Anterior Cervical Nodes] : no anterior cervical lymphadenopathy [No Spinal Tenderness] : no spinal tenderness [Spine Straight] : spine straight [No Stigmata of Cushings Syndrome] : no stigmata of Cushings Syndrome [Oriented x3] : oriented to person, place, and time [Normal Sensation on Monofilament Testing] : normal sensation on monofilament testing of lower extremities [de-identified] : skin in tact, decrease in pulses [de-identified] : decreased pedal pulses [de-identified] : Slow gait [de-identified] : skin intact

## 2022-07-27 NOTE — END OF VISIT
[FreeTextEntry3] : This note was written by Marisela Shi on ( July 26, 2022) acting as a medical scribe for Dr. Correia.  This note was authored by the medical scribe for me. I have reviewed, edited, and revised the note as needed. I am in agreement with the exam findings, imaging findings, and treatment plan.  Dale Correia MD

## 2022-08-02 ENCOUNTER — APPOINTMENT (OUTPATIENT)
Dept: NEUROLOGY | Facility: CLINIC | Age: 77
End: 2022-08-02

## 2022-08-10 ENCOUNTER — APPOINTMENT (OUTPATIENT)
Dept: GERIATRICS | Facility: CLINIC | Age: 77
End: 2022-08-10

## 2022-08-10 VITALS
RESPIRATION RATE: 15 BRPM | DIASTOLIC BLOOD PRESSURE: 60 MMHG | OXYGEN SATURATION: 99 % | TEMPERATURE: 97.7 F | BODY MASS INDEX: 20.95 KG/M2 | WEIGHT: 146 LBS | HEART RATE: 65 BPM | SYSTOLIC BLOOD PRESSURE: 110 MMHG

## 2022-08-10 DIAGNOSIS — G20 PARKINSON'S DISEASE: ICD-10-CM

## 2022-08-10 PROCEDURE — 99215 OFFICE O/P EST HI 40 MIN: CPT

## 2022-08-10 NOTE — ASSESSMENT
[FreeTextEntry1] : *pending NPI-Q and MCSI*\par \par \par Social Care Plan\par 1. Respite: \par \par -Provided hotline contact to Alzheimer's Association [phone].\par -[facility]\par -Provided link to www. communityresourcefinder.org\par -provided [facility] agency \par -task sent to LACIE An.\par \par \par 2. Caregiver Education:\par Sent e-mail to -\par \par I wanted to share useful tools to help manage your family member's dementia behaviors. I have included caregiver training videos from [facility] Alzheimer's and Dementia Care Program to help guide you and caretakers, as well as a daily care plan from the Alzheimer's Association. Maintaining a schedule and a structured day helps patients with dementia. I am available if you need any further assistance or have any questions. See below:\par 1.depression/apathy.\par 2. Alzheimer's Association Daily Care Plan:\par  https://www.alz.org/help-support/caregiving/daily-care/daily-care-plan\par 3. Educated spouse when  behaviors occur to acknowledge patient's emotions and redirect behavior with distractions, address physical needs. Provided examples.\par 4. Provided Alzheimer's website. Educated spouse about support groups in [place].\par \par Look through books, picture albums,, painting, puzzles, listen to music, daily chores.\par \par 5. Alzheimer's Association Community Resource Finder \par    www.alz.org/nca/helping you/community-resource-finder \par 6. Lewy Body Dementia Symptoms and Causes [email or web address] \par 7. LBDA non-profit organization contact\par 8. compassionate allowance program part of SSA \par \par 3. [facility]: [phone] \par [email or web address].org/caregiver-yoga-series/\par \par 3. Caregiver support:\par -met with SW previously \par \par \par 4. Safety: \par -counseled pt. must be accompanied 24/7. Counseled pt. is a high fall risk. Continue will fall safety precautions. \par -medical alert bracelet\par \par 5. CG stress:\par -Counseled on non-pharmacological interventions for stress which include medication micah (Calm), daily physical activity. \par \par 6. Alz Association Support Groups PDF\par \par \par 7. [facility] education folder attached PFD.\par \par \par \par -[facility] acuity GREEN,f/u 12/2022.\par -available for urgent visits and as needed by phone. \par -direct patient time 10-10:45pm\par \par  ROBBIN Carmona-BC

## 2022-08-10 NOTE — PHYSICAL EXAM
[Alert] : alert [Sclera] : the sclera and conjunctiva were normal [PERRL] : pupils were equal in size, round, and reactive to light [Normal Oral Mucosa] : normal oral mucosa [No Oral Pallor] : no oral pallor [No Oral Cyanosis] : no oral cyanosis [Oropharynx] : the oropharynx was normal [Normal Appearance] : the appearance of the neck was normal [No Neck Mass] : no neck mass was observed [No Respiratory Distress] : no respiratory distress [No Acc Muscle Use] : no accessory muscle use [Respiration, Rhythm And Depth] : normal respiratory rhythm and effort [Auscultation Breath Sounds / Voice Sounds] : lungs were clear to auscultation bilaterally [Normal PMI] : the apical impulse was abnormal [Normal S1, S2] : normal S1 and S2 [Murmurs] : no murmurs [Heart Rate And Rhythm] : heart rate was normal and rhythm regular [Edema] : edema was not present [Bowel Sounds] : normal bowel sounds [Abdomen Tenderness] : non-tender [Abdomen Soft] : soft [Cervical Lymph Nodes Enlarged Posterior Bilaterally] : posterior cervical [Supraclavicular Lymph Nodes Enlarged Bilaterally] : supraclavicular [Cervical Lymph Nodes Enlarged Anterior Bilaterally] : anterior cervical, supraclavicular [Axillary Lymph Nodes Enlarged Bilaterally] : axillary [No CVA Tenderness] : no CVA  tenderness [No Spinal Tenderness] : no spinal tenderness [Normal Color / Pigmentation] : normal skin color and pigmentation [No Focal Deficits] : no focal deficits [Normal Affect] : the affect was normal [Normal Mood] : the mood was normal [de-identified] : elderly male, calm, soft spoken, smiling today,calm [de-identified] : rounded [de-identified] : ataxic wide based gait  [de-identified] : RLL abdomen small white lesion 1x1x1 circular elevated, follows with derm [de-identified] : calm

## 2022-08-10 NOTE — HISTORY OF PRESENT ILLNESS
[One fall no injury in past year] : Patient reported one fall in the past year without injury [Patient is independent with] : bathing [Independent] : housekeeping [Full assistance needed] : Assistance needed managing medications [] : Assistance needed managing finances. [FAST Score: ____] : Functional Assessment Scale (FAST) Score: [unfilled] [Smoke Detector] : smoke detector [Carbon Monoxide Detector] : carbon monoxide detector [Stair Lift] : stair lift used in home [Grab Bars] : grab bars [Night Light] : night light [Mild] : Stage: Mild [Stable] : Status: Stable [Designated Healthcare Proxy] : Designated healthcare proxy [Name: ___] : Health Care Proxy's Name: [unfilled]  [I will adhere to the patient's wishes.] : I will adhere to the patient's wishes. [FreeTextEntry1] : ADC Program ID:[id number]\par \par Mr. CAMILA PAYNE is a 76 yoM with PMHx of DLB, was referred to the Alzheimer's and Dementia Program by MD Golden and MD Yang for comanagement of dementia-related issues and coordination of dementia care. Pt. presents with Amira,spouse who assisted in hx intake due to pt.'s dementia. \par \par \par #dementia\par -has cameras and ring doorbell \par -stable, doing well \par \par \par #constipation\par -miralax BID \par -metamucil occasionally\par -colace daily\par \par \par #caregiver burden \par -stable\par -play mahjong on Fridays\par -does not want to be part of support group\par \par \par #HCM\par -Dr. Geronimo 6/2022, scheduled 10/22\par -dermatology biopsy recently, pending results w/ Dr. Altamirano; mercle cell cancer, melanoma \par \par Plan\par -colace 100mg BID \par -Miralax PRN\par -medical alert bracelet \par -educated on MOLST, will review and discuss at f/u.\par -explore [facility] benefits\par \par ADC acuity GREEN,f/u 12/2022.\par \par Denies pain. Denies acute visits/falls. Denies HA/dizziness, SOB/CP, abdominal pain, dysuria, reports daily BMs regular. \par \par drinks 50oz of water \par \par (6/2/2022)\par #dementia\par -PT ended\par -stable, no behaviors\par -sleeping well \par \par #constipation\par -stable\par -drinking a lot more water \par -using Miralaxx PRN \par \par \par #caregiver burden \par -having a lot of back pain\par -needs MRI of right hip \par -reported stress with walking long distances\par -completed parking pass form today\par \par \par Plan\par -speech therapy rx and contact to [facility]\par -HCP needed\par -pending NPI-Q and MCSI \par \par \par ACD acuity YELLOW, caregiver illness. F/u in 2 months. \par \par Denies pain. Denies acute visits/falls. Denies HA/dizziness, SOB/CP, abdominal pain, dysuria, reports daily BMs regular. \par \par \par (2/3/22)\par #dementia\par -appreciated Dr. Golden note 12/7/21 pt. sleeping well, wakes 2x to void  \par -appreciated Dr. Yang note 6/22/2, PET c/w DLB and 6/14/21 cog symptoms began 2014 MoCA 16/30 mis 9/15 (11/21/21), mild stage\par -MRI 7/6/21 volume loss appropriate for age \par -completed PT and OT\par \par Today,\par "undiagnosed learning disability"\par -pt. stated memory issues "8-10 years ago"\par -"problems with executive function, can do very basic reading"\par -urinary incontinence, radiation due to prostate ca\par -on sinemet rx by Dr. Tomlin, pt. reports a slight improvement on the med\par -OT finished, [facility] comes to the house w/ Rosi \par - PT just statred again, good for 3 months. \par -has 4GK \par -weight loss, 20lbs during COVID, follows PCP, no acute findings \par -weight is now stable \par -"some nights I sleep 8-9 hours. Some nights if I have a doctors appt. I get worried I might miss the appt. and wake up" \par -pt. redirects some questions to spouse, when he does not understand\par -occasional dizziness\par -occasional cough with swallowing \par \par #hypotension\par -on midodrine 2.5mg daily \par \par #constipation \par -"big problem" \par -taking miralax and took prunes and explosion of diarrhea 2weeks ago 1/15/22 "I almost brougth him to the hospital"  \par -stopped miralax daily and then constipated again now back on miralax daily, less than half the cap (12g) \par \par \par #eczema \par -topical steriod \par \par #fall \par -12/2021\par -advised to have PT practice the stairs up front \par \par #hospitalizations hx\par -7/19-7/24/19 dizziness/hypotension, IVF and midodrine \par - SBO 2015 w/ feeding tube \par \par #caregiver burden \par -wife retired 2017, book keeper, MS in education \par -wife hospitalized 2020, son cared for pt. \par -up during night, plays "wordle"\par \par #HCM\par -PCP Dr. Kartik Geronimo at [facility] last seen 8/2021, f/u 1/2022, stable \par \par \par Denies pain. Denies acute visits/falls. Denies HA/dizziness, SOB/CP, abdominal pain, dysuria, reports daily BMs regular. \par \par "drinks 1x 16oz and 1 seltzer" counseled to increase water intake \par \par Goal\par -OT in the spring \par -HCP needs to provide \par -contact \par -is a ;advised to [facility] at [facility] about [facility] hours  (sent an OT that installed grab bars and raised toilet) NP Bryant Hood \par -occasional anxiety:calm micah\par \par *pending NPI-Q and MCSI*\par \par CAREGIVER 1: \par Name/Relationship: Amira,wife\par Involvement in care:\par Mailing Address:\par Phone Number:[phone]\par E-mail:\par Best time to reach this person: \par Patient permission to contact this person:\par \par CAREGIVER 2: \par Name/Relationship:Germán,son \par Involvement in care:[facility], visits 1x/week \par Mailing Address:\par Phone Number:\par E-mail:\par Best time to reach this person: \par Patient permission to contact this person:\par \par CAREGIVER 3: \par Name/Relationship:Leticia, daughter  \par Involvement in care:[place], visits 1x/week \par Mailing Address:\par Phone Number:\par E-mail:\par Best time to reach this person: \par Patient permission to contact this person:\par \par Primary Care Physician:PCP Dr. Kartik Geronimo at [facility]\par Previous Physicians:\par Neurologists:Dr. Yang\par \par \par Suspect Medications (associated with mental status changes): \par Recent hospitalization/ ED Visits/ Nursing Home Stays:\par \par Social History:\par Primary Language:English\par Marital Status:\par Children:2\par Education:MS\par Occupation:accounting \par Activity/Exercise:\par Alcohol:no\par Sexually active: \par Driving Habits:no\par Firearms:no\par \par Living Situation:\par Housing (stairs/levels): 12 steps to the basements, "has to do the wash" \par Length at Residence:32 years \par Lives with:spouse \par Caregivers (non-paid and paid):no, just got a cleaning lady 2 weeks ago. \par Safety Concerns: none \par Wandering:no \par Falls:12/2021 on the step, by himself, "tripped"\par Fear of Falling: no\par \par Financial Situation:SS, pension, no concerns elder  (over the summer), POA\par \par Advance Directives:\par HCP/Advance Directives/Living will:wife? no documents, will provided. PAD and HCP and pelayo that need to be updated \par HCP/Alternate Decision Maker: advise to meet elder law   \par MOLST: educated on MOLST form\par What matters most? \par "TV, law and order" \par is [facility]  [Guns at Home] : no guns at home [Driving Concerns] : not driving or driving without noted concerns [FreeTextEntry8] : gets to bathroom in time  [de-identified] : 7 [de-identified] : with spouse  [de-identified] : "makes scrambled egg with a bagel and hot sauce" counseled on cooking safety. Advised cameras, now has.  [de-identified] : counseled on fall safety  [FreeTextEntry6] : spouse [FreeTextEntry7] : needs assitance. spouse prepares the med box, "forgets to take insulin or meds" supervision  [de-identified] : 6 [de-identified] : has a rollator, " I can't pick him up". counseled pt. cannot be a lone  [de-identified] : uses sock kit  [CornellScale] : 4 2/3/2022 [de-identified] : pending NPI-! and MCSI [Reviewed updated] : Reviewed, updated [AdvancecareDate] : 8/10/22 [FreeTextEntry4] : Amira will review MOLST and plan to complete at f/u?\par \par Advance Directives:\par HCP/Advance Directives/Living will:Amira; need to provide copy \par HCP/Alternate Decision Maker: Germán Gagnon followed by Leticia Acsota\par MOLST: educated on MOLST form\par What matters most? \par "TV, law and order" \par is [facility]

## 2022-09-21 ENCOUNTER — RX RENEWAL (OUTPATIENT)
Age: 77
End: 2022-09-21

## 2022-10-13 PROBLEM — Z13.31 SCREENING FOR DEPRESSION: Status: ACTIVE | Noted: 2021-12-07

## 2022-11-17 NOTE — PHYSICAL EXAM
[FreeTextEntry1] : There is facial masking. Hypophonic. There are hypometric saccades. Tremor is absent.  2+ cogwheeling in his limbs R>L. Eric 3+ distal >proximal, R>L. There is no ataxia. able to stand without use of hands. Walks with reduced SL and both arms. no FOG. pull test with 3 steps

## 2022-11-17 NOTE — HISTORY OF PRESENT ILLNESS
[FreeTextEntry1] : Since last visit in June 30, 2022.\par He fell in the garage 1.5 months ago. he might of tripped on the step. No LOC. \par takes naps during the day about 1.5 to 2hrs. \par Sleep is well. nocturia 2x/night \par no exercise or PT. watch TV most of the day. It is unclear if he is apathetic. In a group setting he may have apathy. \par \par Nonmotor:\par + constipation problematic. Has BM 2x/week. colace now. not taking miralax. \par sleep is good. RBD rare\par Drooling is not a problem when eating \par + coughs when he eats sometimes\par occasional orthostatis managed with midodrine qod\par \par \par PMH HTN, CAD s/p PCI, DM, CKD\par \par PD meds\par sinemet  1 tab QID ( q3.5-4hrs). 8am, 11, 2pm, 5. the timing depends if he takes a nap. \par sinemet ER  1 tab qhs\par midodrine 2,5mg every other day

## 2022-11-17 NOTE — END OF VISIT
[] : Fellow [FreeTextEntry3] : HPI outlined above. Wife says that he is moving slowly but can do all ADLs. Does not have hallucinations but can easily get confused and have halluicnations. Had one fall while descending a flight of stairs--did not incur much injury. Sleeps well at night and takes 2hrs nap at midday. Does not exercise\par \par On exam: masked. Moderate hypophonia. Tremor absent. There is moderate  bradykinesia with 2+ rigidity. Shuffles with en bloc turns. Left armswing depressed. Pull test negative\par \par Imp: PDD with bradykinesia and daytime somnolence. \par - Will try selegiline 5mg qAM to see if bradykinesia and somnolence responds\par - cont sinemet 1 tab qid. Higher doses worsened his daytime hypersomnolence. \par - encouraged to increase physical activity\par - f/u GI for constipation. cont colace BID. miralax prn\par \par f/u 3-4 months

## 2022-11-17 NOTE — DISCUSSION/SUMMARY
[FreeTextEntry1] : PDD who is motorically and cognitively stable. \par Refractory constipation\par Has some word findings difficulties. Their biggest issue is his slowness, apathy to do things, and lack of physical activity. He reports being on higher doses of LD without a large benefit. \par \par Patient was counseled on the following recommendations:\par \par - Will start selegiline for its amphetamine secondary properties which may help him stay more awake. \par - Start drinking coffee \par - Otherwise leave LD regimen the same\par - He has not followed GI for constipation management\par - increase weekly exercising\par \par RTC 3-4 months\par \par Case discussed and pt examined with movement attending Dr. Riley \par \par Ousmane Hsu MD\par Movement Fellow

## 2022-12-05 NOTE — ASSESSMENT
[Diabetic Medications] : Risks and benefits of diabetic medications were discussed [Levothyroxine] : The patient was instructed to take Levothyroxine on an empty stomach, separate from vitamins, and wait at least 30 minutes before eating [FreeTextEntry1] : 76 y/o male with: DM Type 2, hypothyroidism. Demential Lewy Body type\par \par 1. T2DM: Pt appears to be tightly controlled on NovoLog 3/3 and Lantus 18 units.   . No further hypos. Normal monofilament test. Pt reports  recent A1c 6.%\par \par Pt taking NovoLog  3 units continue dinnertime 3 units and  Lantus  18 units.   \par \par Treatment of Diabetes in Older Adults: An Endocrine Society* Clinical Practice Guideline Tom Milan  et al. The Journal of Clinical Endocrinology & Metabolism, Volume 104, Issue 5, May 2019, Pages 1521–1572\par \par 2. Hypothyroidism. Clinically euthyroid on LT4 75 mcg daily. No local neck pain. No dysphagia or dysphonia. No raciness, shakiness, heat/cold intolerance, tiredness, or fatigue. No palpitations, tremors, or sudden weight gain/loss. Repeat TFT.\par \par 3. HLD well controlled on current medication.\par \par Physical examination: skin in tact, decrease in pulses\par Labs show a significant abnormal renal function including elevated creatinine and microalbumin.  Has scheduled follow-up appointment with nephrology.\par TSH 1.52 \par Had add'l labs with Dr Geronimo, wife reports A1c 6.8\par F/u in 4 months

## 2022-12-05 NOTE — PHYSICAL EXAM
[Alert] : alert [Well Nourished] : well nourished [No Acute Distress] : no acute distress [Well Developed] : well developed [Normal Sclera/Conjunctiva] : normal sclera/conjunctiva [EOMI] : extra ocular movement intact [No Proptosis] : no proptosis [No Lid Lag] : no lid lag [Thyroid Not Enlarged] : the thyroid was not enlarged [No Thyroid Nodules] : no palpable thyroid nodules [Clear to Auscultation] : lungs were clear to auscultation bilaterally [Normal S1, S2] : normal S1 and S2 [Normal Rate] : heart rate was normal [Regular Rhythm] : with a regular rhythm [No Edema] : no peripheral edema [Normal Bowel Sounds] : normal bowel sounds [Not Tender] : non-tender [Not Distended] : not distended [Soft] : abdomen soft [Normal Anterior Cervical Nodes] : no anterior cervical lymphadenopathy [No Spinal Tenderness] : no spinal tenderness [Spine Straight] : spine straight [No Stigmata of Cushings Syndrome] : no stigmata of Cushings Syndrome [Normal Sensation on Monofilament Testing] : normal sensation on monofilament testing of lower extremities [Oriented x3] : oriented to person, place, and time [de-identified] : skin in tact, decrease in pulses [de-identified] : decreased pedal pulses [de-identified] : Slow gait

## 2022-12-05 NOTE — HISTORY OF PRESENT ILLNESS
[FreeTextEntry1] : Patient returns for a follow up visit for DM Type 2 and hypothyroidism. \par \par Type 2 DM: Tightly controlled on NovoLog 3 units at breakfast and 3 units at dinner.  And Lantus 18 units.  Stopped prelunch insulin.  Blood sugars reported in the low 100 range.  No major hypoglycemia.  No polyuria polydipsia.   . Last ophtho 10/2022\par Hypothyroidism: on LT4 75 mcg daily. No local neck pain. No dysphagia or dysphonia. No raciness, shakiness, heat/cold intolerance, tiredness, or fatigue. No palpitations, tremors, or sudden weight gain/loss.\par Reviewed review of review of blood tests on patient's cell phone October 2022.  Creatinine 2.56 urine microalbumin elevated 823.  Hemoglobin A1c stable 6.7%.  LDL good 41.\par Other Hx;\par Pt has been having constant constipation. Pt is now taking MiraLAX twice a day. Early dementia, Lewy Body,  no Rx yet\par Had  shingles left flank \par \par Previously saw Dr. Toussaint from renal for diabetic nephropathy. Prior Cre: 2.19, was allergic to ACE, unclear if ever on ARB. H/o uric acid kidney stones on Allopurinol. No recurrence. Renal US 2.4 cm R renal mass, ordered MRI to r/o carcinoma.\par  \par Dx with prostate CA in 2018. Began RT May 2019.\par \par Added extended release carbidopa/levodopa for Parkinsons.\par s/p recent tooth extractions
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
## 2022-12-12 NOTE — ASSESSMENT
[FreeTextEntry1] : *pending NPI-Q and MCSI*\par \par \par Social Care Plan\par 1. Respite: \par \par -Provided hotline contact to Alzheimer's Association [phone].\par -[facility]\par -Provided link to www. communityresourcefinder.org\par -provided [facility] agency \par -task sent to LACIE An.\par \par \par 2. Caregiver Education:\par Sent e-mail to -\par \par I wanted to share useful tools to help manage your family member's dementia behaviors. I have included caregiver training videos from [facility] Alzheimer's and Dementia Care Program to help guide you and caretakers, as well as a daily care plan from the Alzheimer's Association. Maintaining a schedule and a structured day helps patients with dementia. I am available if you need any further assistance or have any questions. See below:\par 1.depression/apathy.\par 2. Alzheimer's Association Daily Care Plan:\par  https://www.alz.org/help-support/caregiving/daily-care/daily-care-plan\par 3. Educated spouse when  behaviors occur to acknowledge patient's emotions and redirect behavior with distractions, address physical needs. Provided examples.\par 4. Provided Alzheimer's website. Educated spouse about support groups in [place].\par \par Look through books, picture albums,, painting, puzzles, listen to music, daily chores.\par \par 5. Alzheimer's Association Community Resource Finder \par    www.alz.org/nca/helping you/community-resource-finder \par 6. Lewy Body Dementia Symptoms and Causes alz.org \par 7. LBDA non-profit organization contact\par 8. compassionate allowance program part of [employer] \par \par 3. [facility]: [phone] \par www.lidemntia.org/caregiver-yoga-series/\par \par 3. Caregiver support:\par -met with SW previously \par \par \par 4. Safety: \par -counseled pt. must be accompanied 24/7. Counseled pt. is a high fall risk. Continue will fall safety precautions. \par -medical alert bracelet\par \par 5. CG stress:\par -Counseled on non-pharmacological interventions for stress which include medication micah (Calm), daily physical activity. \par \par 6. Alz Association Support Groups PDF\par \par \par 7. [facility] education folder attached PFD.\par \par \par \par ADC Acuity GREEN. ADC Annual 2/2023.\par \par  Hina Arriaga, LILIP-BC

## 2022-12-12 NOTE — HISTORY OF PRESENT ILLNESS
[One fall no injury in past year] : Patient reported one fall in the past year without injury [Patient is independent with] : bathing [Independent] : housekeeping [Full assistance needed] : Assistance needed managing medications [] : Assistance needed managing finances. [FAST Score: ____] : Functional Assessment Scale (FAST) Score: [unfilled] [Smoke Detector] : smoke detector [Carbon Monoxide Detector] : carbon monoxide detector [Stair Lift] : stair lift used in home [Grab Bars] : grab bars [Night Light] : night light [Mild] : Stage: Mild [Stable] : Status: Stable [Reviewed updated] : Reviewed, updated [Designated Healthcare Proxy] : Designated healthcare proxy [Name: ___] : Health Care Proxy's Name: [unfilled]  [I will adhere to the patient's wishes.] : I will adhere to the patient's wishes. [FreeTextEntry1] : ADC Program ID:[id number]\par \par Mr. CAMILA PAYNE is a 76 yoM with PMHx of DLB, was referred to the [facility] by MD Golden and MD Yang for comanagement of dementia-related issues and coordination of dementia care. Pt. presents with Amira,spouse who assisted in hx intake due to pt.'s dementia. \par \par #dementia\par -has not started selegiline 5mg,met w/ pharmacist today re medication counseling\par -fall in the garage about 2m ago, no injury \par -reports WFD\par -sometimes left alone while spouse runs errands \par \par #constipation\par -"reports not a problem now" \par -drinking more water\par -taking colace at bedtime \par -occasional burns with swallowing med\par \par \par #caregiver burden \par -stable\par \par \par #HCM\par -last pcp was 10/2023, got flu shot \par \par \par Denies acute visits/hospitalizations.Denies pain. Denies acute visits/falls. Denies HA/dizziness, SOB/CP, abdominal pain, dysuria, reports daily BMs regular.  \par \par \par Plan\par -rec 24/7 supervision\par -medical alert bracelet \par -PT at home w/ Pacheco\par -medical alert bracelet \par -[facility] referral\par -met w/ pharmacist \par \par -ADC annual 2/2023, provided annual forms today. ADC Acuity GREEN. \par \par (8/10/22)\par #dementia\par -has cameras and ring doorbell \par -stable, doing well \par \par #constipation\par -miralax BID \par -metamucil occasionally\par -colace daily\par \par \par #caregiver burden \par -stable\par -play mahjong on Fridays\par -does not want to be part of support group\par \par \par #HCM\par -Dr. Geronimo 6/2022, scheduled 10/22\par -dermatology biopsy recently, pending results w/ Dr. Altamirano; mercle cell cancer, melanoma \par \par Plan\par -colace 100mg BID \par -Miralax PRN\par -medical alert bracelet \par -educated on MOLST, will review and discuss at f/u.\par -explore [facility] benefits\par \par ADC acuity GREEN,f/u 12/2022.\par \par Denies pain. Denies acute visits/falls. Denies HA/dizziness, SOB/CP, abdominal pain, dysuria, reports daily BMs regular. \par \par drinks 50oz of water \par \par (6/2/2022)\par #dementia\par -PT ended\par -stable, no behaviors\par -sleeping well \par \par #constipation\par -stable\par -drinking a lot more water \par -using Miralaxx PRN \par \par \par #caregiver burden \par -having a lot of back pain\par -needs MRI of right hip \par -reported stress with walking long distances\par -completed parking pass form today\par \par \par Plan\par -speech therapy rx and contact to [facility]\par -HCP needed\par -pending NPI-Q and MCSI \par \par \par ACD acuity YELLOW, caregiver illness. F/u in 2 months. \par \par Denies pain. Denies acute visits/falls. Denies HA/dizziness, SOB/CP, abdominal pain, dysuria, reports daily BMs regular. \par \par \par (2/3/22)\par #dementia\par -appreciated Dr. Golden note 12/7/21 pt. sleeping well, wakes 2x to void  \par -appreciated Dr. Yang note 6/22/2, PET c/w DLB and 6/14/21 cog symptoms began 2014 MoCA 16/30 mis 9/15 (11/21/21), mild stage\par -MRI 7/6/21 volume loss appropriate for age \par -completed PT and OT\par \par Today,\par "undiagnosed learning disability"\par -pt. stated memory issues "8-10 years ago"\par -"problems with executive function, can do very basic reading"\par -urinary incontinence, radiation due to prostate ca\par -on sinemet rx by Dr. Tomlin, pt. reports a slight improvement on the med\par -OT finished, [facility] comes to the house w/ Rosi \par - PT just statred again, good for 3 months. \par -has 4GK \par -weight loss, 20lbs during COVID, follows PCP, no acute findings \par -weight is now stable \par -"some nights I sleep 8-9 hours. Some nights if I have a doctors appt. I get worried I might miss the appt. and wake up" \par -pt. redirects some questions to spouse, when he does not understand\par -occasional dizziness\par -occasional cough with swallowing \par \par #hypotension\par -on midodrine 2.5mg daily \par \par #constipation \par -"big problem" \par -taking miralax and took prunes and explosion of diarrhea 2weeks ago 1/15/22 "I almost brougth him to the hospital"  \par -stopped miralax daily and then constipated again now back on miralax daily, less than half the cap (12g) \par \par \par #eczema \par -topical steriod \par \par #fall \par -12/2021\par -advised to have PT practice the stairs up front \par \par #hospitalizations hx\par -7/19-7/24/19 dizziness/hypotension, IVF and midodrine \par - SBO 2015 w/ feeding tube \par \par #caregiver burden \par -wife retired 2017, book keeper, MS in education \par -wife hospitalized 2020, son cared for pt. \par -up during night, plays "wordle"\par \par #HCM\par -PCP Dr. Kartik Geronimo at [facility] last seen 8/2021, f/u 1/2022, stable \par \par \par Denies pain. Denies acute visits/falls. Denies HA/dizziness, SOB/CP, abdominal pain, dysuria, reports daily BMs regular. \par \par "drinks 1x 16oz and 1 seltzer" counseled to increase water intake \par \par Goal\par -OT in the spring \par -HCP needs to provide \par -contact \par -is a [place];advised to speak to NP at [facility] office about HHA hours  (sent an OT that installed grab bars and raised toilet) NP Bryant Hood \par -occasional anxiety:calm micah\par \par *pending NPI-Q and MCSI*\par \par CAREGIVER 1: \par Name/Relationship: Amira,wife\par Involvement in care:\par Mailing Address:\par Phone Number:[phone]\par E-mail:\par Best time to reach this person: \par Patient permission to contact this person:\par \par CAREGIVER 2: \par Name/Relationship:Germán,son \par Involvement in care:[place], visits 1x/week \par Mailing Address:\par Phone Number:\par E-mail:\par Best time to reach this person: \par Patient permission to contact this person:\par \par CAREGIVER 3: \par Name/Relationship:Leticia, daughter  \par Involvement in care:[place], visits 1x/week \par Mailing Address:\par Phone Number:\par E-mail:\par Best time to reach this person: \par Patient permission to contact this person:\par \par Primary Care Physician:PCP Dr. Kartik Geronimo at [facility]\par Previous Physicians:\par Neurologists:Dr. Yang\par \par \par Suspect Medications (associated with mental status changes): \par Recent hospitalization/ ED Visits/ Nursing Home Stays:\par \par Social History:\par Primary Language:English\par Marital Status:\par Children:2\par Education:MS\par Occupation:accounting \par Activity/Exercise:\par Alcohol:no\par Sexually active: \par Driving Habits:no\par Firearms:no\par \par Living Situation:\par Housing (stairs/levels): 12 steps to the basements, "has to do the wash" \par Length at Residence:32 years \par Lives with:spouse \par Caregivers (non-paid and paid):no, just got a cleaning lady 2 weeks ago. \par Safety Concerns: none \par Wandering:no \par Falls:12/2021 on the step, by himself, "tripped"\par Fear of Falling: no\par \par Financial Situation:SS, pension, no concerns elder  (over the summer), POA\par \par Advance Directives:\par HCP/Advance Directives/Living will:wife? no documents, will provided. PAD and HCP and pelayo that need to be updated \par HCP/Alternate Decision Maker: advise to meet elder law   \par MOLST: educated on MOLST form\par What matters most? \par "TV, law and order" \par is [facility]  [Guns at Home] : no guns at home [Driving Concerns] : not driving or driving without noted concerns [FreeTextEntry8] : gets to bathroom in time  [de-identified] : 7 [de-identified] : with spouse  [de-identified] : "makes scrambled egg with a bagel and hot sauce" counseled on cooking safety. Advised cameras, now has.  [de-identified] : counseled on fall safety  [FreeTextEntry6] : spouse [FreeTextEntry7] : needs assitance. spouse prepares the med box, "forgets to take insulin or meds" supervision  [de-identified] : 6 [de-identified] : has a rollator, " I can't pick him up". counseled pt. cannot be a lone  [de-identified] : uses sock kit  [CornellScale] : 4 2/3/2022 [de-identified] : pending NPI-! and MCSI [AdvancecareDate] : 12/12/22 [FreeTextEntry4] : Amira will review MOLST and plan to complete at f/u?\par \par Advance Directives:\par HCP/Advance Directives/Living will:Amira; need to provide copy \par HCP/Alternate Decision Maker: Germán Gagnon followed by Leticia Acosta\par MOLST: educated on MOLST form\par What matters most? \par "TV, law and order" \par is VA

## 2022-12-12 NOTE — PHYSICAL EXAM
[Alert] : alert [Sclera] : the sclera and conjunctiva were normal [PERRL] : pupils were equal in size, round, and reactive to light [Normal Oral Mucosa] : normal oral mucosa [No Oral Pallor] : no oral pallor [No Oral Cyanosis] : no oral cyanosis [Oropharynx] : the oropharynx was normal [Normal Appearance] : the appearance of the neck was normal [No Neck Mass] : no neck mass was observed [No Respiratory Distress] : no respiratory distress [No Acc Muscle Use] : no accessory muscle use [Respiration, Rhythm And Depth] : normal respiratory rhythm and effort [Auscultation Breath Sounds / Voice Sounds] : lungs were clear to auscultation bilaterally [Normal PMI] : the apical impulse was abnormal [Normal S1, S2] : normal S1 and S2 [Murmurs] : no murmurs [Heart Rate And Rhythm] : heart rate was normal and rhythm regular [Edema] : edema was not present [Bowel Sounds] : normal bowel sounds [Abdomen Tenderness] : non-tender [Abdomen Soft] : soft [Cervical Lymph Nodes Enlarged Posterior Bilaterally] : posterior cervical [Supraclavicular Lymph Nodes Enlarged Bilaterally] : supraclavicular [Cervical Lymph Nodes Enlarged Anterior Bilaterally] : anterior cervical, supraclavicular [Axillary Lymph Nodes Enlarged Bilaterally] : axillary [No CVA Tenderness] : no CVA  tenderness [No Spinal Tenderness] : no spinal tenderness [Normal Color / Pigmentation] : normal skin color and pigmentation [No Focal Deficits] : no focal deficits [Normal Affect] : the affect was normal [Normal Mood] : the mood was normal [de-identified] : elderly male, calm, soft spoken, smiling today,calm [de-identified] : rounded [de-identified] : ataxic wide based gait  [de-identified] : calm

## 2023-01-01 ENCOUNTER — NON-APPOINTMENT (OUTPATIENT)
Age: 78
End: 2023-01-01

## 2023-01-01 ENCOUNTER — APPOINTMENT (OUTPATIENT)
Dept: NEUROLOGY | Facility: CLINIC | Age: 78
End: 2023-01-01

## 2023-01-01 ENCOUNTER — TRANSCRIPTION ENCOUNTER (OUTPATIENT)
Age: 78
End: 2023-01-01

## 2023-01-01 ENCOUNTER — APPOINTMENT (OUTPATIENT)
Dept: NEUROLOGY | Facility: CLINIC | Age: 78
End: 2023-01-01
Payer: MEDICARE

## 2023-01-01 ENCOUNTER — APPOINTMENT (OUTPATIENT)
Dept: GERIATRICS | Facility: CLINIC | Age: 78
End: 2023-01-01
Payer: MEDICARE

## 2023-01-01 ENCOUNTER — APPOINTMENT (OUTPATIENT)
Dept: NEPHROLOGY | Facility: CLINIC | Age: 78
End: 2023-01-01
Payer: MEDICARE

## 2023-01-01 ENCOUNTER — OUTPATIENT (OUTPATIENT)
Dept: OUTPATIENT SERVICES | Facility: HOSPITAL | Age: 78
LOS: 1 days | End: 2023-01-01
Payer: MEDICARE

## 2023-01-01 ENCOUNTER — APPOINTMENT (OUTPATIENT)
Dept: ENDOCRINOLOGY | Facility: CLINIC | Age: 78
End: 2023-01-01
Payer: MEDICARE

## 2023-01-01 ENCOUNTER — RX RENEWAL (OUTPATIENT)
Age: 78
End: 2023-01-01

## 2023-01-01 ENCOUNTER — APPOINTMENT (OUTPATIENT)
Dept: CT IMAGING | Facility: CLINIC | Age: 78
End: 2023-01-01
Payer: MEDICARE

## 2023-01-01 ENCOUNTER — APPOINTMENT (OUTPATIENT)
Dept: GASTROENTEROLOGY | Facility: CLINIC | Age: 78
End: 2023-01-01
Payer: MEDICARE

## 2023-01-01 VITALS — DIASTOLIC BLOOD PRESSURE: 95 MMHG | HEART RATE: 60 BPM | SYSTOLIC BLOOD PRESSURE: 177 MMHG

## 2023-01-01 VITALS
DIASTOLIC BLOOD PRESSURE: 68 MMHG | HEART RATE: 61 BPM | SYSTOLIC BLOOD PRESSURE: 120 MMHG | OXYGEN SATURATION: 100 % | BODY MASS INDEX: 19.92 KG/M2 | WEIGHT: 131 LBS

## 2023-01-01 VITALS
HEIGHT: 68 IN | BODY MASS INDEX: 21.52 KG/M2 | SYSTOLIC BLOOD PRESSURE: 147 MMHG | OXYGEN SATURATION: 97 % | HEART RATE: 61 BPM | DIASTOLIC BLOOD PRESSURE: 77 MMHG | TEMPERATURE: 96.9 F | WEIGHT: 142 LBS

## 2023-01-01 VITALS
DIASTOLIC BLOOD PRESSURE: 80 MMHG | RESPIRATION RATE: 16 BRPM | TEMPERATURE: 97.3 F | BODY MASS INDEX: 21.37 KG/M2 | SYSTOLIC BLOOD PRESSURE: 178 MMHG | OXYGEN SATURATION: 99 % | WEIGHT: 141 LBS | HEART RATE: 68 BPM | HEIGHT: 68 IN

## 2023-01-01 VITALS
DIASTOLIC BLOOD PRESSURE: 75 MMHG | BODY MASS INDEX: 20.23 KG/M2 | TEMPERATURE: 97.2 F | HEART RATE: 67 BPM | HEIGHT: 68 IN | WEIGHT: 133.5 LBS | RESPIRATION RATE: 16 BRPM | OXYGEN SATURATION: 99 % | SYSTOLIC BLOOD PRESSURE: 161 MMHG

## 2023-01-01 VITALS
HEIGHT: 68 IN | SYSTOLIC BLOOD PRESSURE: 120 MMHG | OXYGEN SATURATION: 100 % | TEMPERATURE: 96.8 F | WEIGHT: 138 LBS | DIASTOLIC BLOOD PRESSURE: 67 MMHG | BODY MASS INDEX: 20.92 KG/M2 | HEART RATE: 65 BPM

## 2023-01-01 VITALS — DIASTOLIC BLOOD PRESSURE: 73 MMHG | SYSTOLIC BLOOD PRESSURE: 121 MMHG | HEART RATE: 73 BPM

## 2023-01-01 VITALS
HEIGHT: 66.34 IN | OXYGEN SATURATION: 99 % | SYSTOLIC BLOOD PRESSURE: 120 MMHG | BODY MASS INDEX: 21.44 KG/M2 | DIASTOLIC BLOOD PRESSURE: 80 MMHG | WEIGHT: 135 LBS | HEART RATE: 59 BPM

## 2023-01-01 VITALS
OXYGEN SATURATION: 98 % | BODY MASS INDEX: 20.61 KG/M2 | HEART RATE: 64 BPM | SYSTOLIC BLOOD PRESSURE: 98 MMHG | DIASTOLIC BLOOD PRESSURE: 60 MMHG | HEIGHT: 68 IN | WEIGHT: 136 LBS

## 2023-01-01 VITALS — SYSTOLIC BLOOD PRESSURE: 130 MMHG | DIASTOLIC BLOOD PRESSURE: 68 MMHG

## 2023-01-01 VITALS
DIASTOLIC BLOOD PRESSURE: 74 MMHG | SYSTOLIC BLOOD PRESSURE: 108 MMHG | HEART RATE: 72 BPM | HEIGHT: 68 IN | WEIGHT: 136 LBS | BODY MASS INDEX: 20.61 KG/M2

## 2023-01-01 VITALS — SYSTOLIC BLOOD PRESSURE: 97 MMHG | DIASTOLIC BLOOD PRESSURE: 57 MMHG

## 2023-01-01 VITALS
HEART RATE: 66 BPM | BODY MASS INDEX: 21.07 KG/M2 | TEMPERATURE: 97.1 F | WEIGHT: 139 LBS | DIASTOLIC BLOOD PRESSURE: 72 MMHG | SYSTOLIC BLOOD PRESSURE: 118 MMHG | HEIGHT: 68 IN | OXYGEN SATURATION: 99 %

## 2023-01-01 VITALS
BODY MASS INDEX: 21.37 KG/M2 | SYSTOLIC BLOOD PRESSURE: 170 MMHG | HEIGHT: 68 IN | WEIGHT: 141 LBS | HEART RATE: 60 BPM | DIASTOLIC BLOOD PRESSURE: 78 MMHG

## 2023-01-01 VITALS
BODY MASS INDEX: 20.92 KG/M2 | HEIGHT: 68 IN | SYSTOLIC BLOOD PRESSURE: 171 MMHG | WEIGHT: 138 LBS | HEART RATE: 60 BPM | DIASTOLIC BLOOD PRESSURE: 84 MMHG

## 2023-01-01 DIAGNOSIS — K59.09 OTHER CONSTIPATION: ICD-10-CM

## 2023-01-01 DIAGNOSIS — R13.10 DYSPHAGIA, UNSPECIFIED: ICD-10-CM

## 2023-01-01 DIAGNOSIS — I10 ESSENTIAL (PRIMARY) HYPERTENSION: ICD-10-CM

## 2023-01-01 DIAGNOSIS — Z98.89 OTHER SPECIFIED POSTPROCEDURAL STATES: Chronic | ICD-10-CM

## 2023-01-01 DIAGNOSIS — Z91.81 HISTORY OF FALLING: ICD-10-CM

## 2023-01-01 DIAGNOSIS — N18.30 CHRONIC KIDNEY DISEASE, STAGE 3 UNSPECIFIED: ICD-10-CM

## 2023-01-01 DIAGNOSIS — Z95.5 PRESENCE OF CORONARY ANGIOPLASTY IMPLANT AND GRAFT: Chronic | ICD-10-CM

## 2023-01-01 DIAGNOSIS — G31.83 DEMENTIA WITH LEWY BODIES: ICD-10-CM

## 2023-01-01 DIAGNOSIS — Z71.89 OTHER SPECIFIED COUNSELING: ICD-10-CM

## 2023-01-01 DIAGNOSIS — E11.21 TYPE 2 DIABETES MELLITUS WITH DIABETIC NEPHROPATHY: ICD-10-CM

## 2023-01-01 DIAGNOSIS — R26.0 ATAXIC GAIT: ICD-10-CM

## 2023-01-01 DIAGNOSIS — Z63.6 DEPENDENT RELATIVE NEEDING CARE AT HOME: ICD-10-CM

## 2023-01-01 DIAGNOSIS — E03.9 HYPOTHYROIDISM, UNSPECIFIED: ICD-10-CM

## 2023-01-01 DIAGNOSIS — Z95.818 PRESENCE OF OTHER CARDIAC IMPLANTS AND GRAFTS: Chronic | ICD-10-CM

## 2023-01-01 DIAGNOSIS — Z74.09 OTHER REDUCED MOBILITY: ICD-10-CM

## 2023-01-01 DIAGNOSIS — Z78.9 OTHER REDUCED MOBILITY: ICD-10-CM

## 2023-01-01 DIAGNOSIS — Z98.49 CATARACT EXTRACTION STATUS, UNSPECIFIED EYE: Chronic | ICD-10-CM

## 2023-01-01 DIAGNOSIS — N20.0 CALCULUS OF KIDNEY: Chronic | ICD-10-CM

## 2023-01-01 DIAGNOSIS — F02.80 DEMENTIA WITH LEWY BODIES: ICD-10-CM

## 2023-01-01 DIAGNOSIS — R63.0 ANOREXIA: ICD-10-CM

## 2023-01-01 LAB
ALBUMIN SERPL ELPH-MCNC: 4.2 G/DL
ALP BLD-CCNC: 112 U/L
ALT SERPL-CCNC: <5 U/L
ANION GAP SERPL CALC-SCNC: 14 MMOL/L
APPEARANCE: CLEAR
AST SERPL-CCNC: 6 U/L
BACTERIA: NEGATIVE
BASOPHILS # BLD AUTO: 0.07 K/UL
BASOPHILS NFR BLD AUTO: 0.8 %
BILIRUB SERPL-MCNC: 0.4 MG/DL
BILIRUBIN URINE: NEGATIVE
BLOOD URINE: NEGATIVE
BUN SERPL-MCNC: 55 MG/DL
CALCIUM SERPL-MCNC: 9.6 MG/DL
CHLORIDE SERPL-SCNC: 104 MMOL/L
CO2 SERPL-SCNC: 24 MMOL/L
COLOR: YELLOW
CREAT SERPL-MCNC: 2.74 MG/DL
CREAT SPEC-SCNC: 90 MG/DL
CREAT/PROT UR: 2.1 RATIO
EGFR: 23 ML/MIN/1.73M2
EOSINOPHIL # BLD AUTO: 0.31 K/UL
EOSINOPHIL NFR BLD AUTO: 3.7 %
GLUCOSE QUALITATIVE U: ABNORMAL
GLUCOSE SERPL-MCNC: 140 MG/DL
HBA1C MFR BLD HPLC: 5.8
HBA1C MFR BLD HPLC: 6.2
HCT VFR BLD CALC: 35.9 %
HGB BLD-MCNC: 11.7 G/DL
HYALINE CASTS: 15 /LPF
IMM GRANULOCYTES NFR BLD AUTO: 0.4 %
KETONES URINE: NEGATIVE
LEUKOCYTE ESTERASE URINE: NEGATIVE
LYMPHOCYTES # BLD AUTO: 1.15 K/UL
LYMPHOCYTES NFR BLD AUTO: 13.9 %
MAN DIFF?: NORMAL
MCHC RBC-ENTMCNC: 28.7 PG
MCHC RBC-ENTMCNC: 32.6 GM/DL
MCV RBC AUTO: 88.2 FL
MICROSCOPIC-UA: NORMAL
MONOCYTES # BLD AUTO: 0.67 K/UL
MONOCYTES NFR BLD AUTO: 8.1 %
NEUTROPHILS # BLD AUTO: 6.05 K/UL
NEUTROPHILS NFR BLD AUTO: 73.1 %
NITRITE URINE: NEGATIVE
PH URINE: 6
PLATELET # BLD AUTO: 166 K/UL
POTASSIUM SERPL-SCNC: 5 MMOL/L
PROT SERPL-MCNC: 6.9 G/DL
PROT UR-MCNC: 193 MG/DL
PROTEIN URINE: ABNORMAL
RBC # BLD: 4.07 M/UL
RBC # FLD: 13.5 %
RED BLOOD CELLS URINE: 2 /HPF
SODIUM SERPL-SCNC: 142 MMOL/L
SPECIFIC GRAVITY URINE: 1.02
SQUAMOUS EPITHELIAL CELLS: 2 /HPF
UROBILINOGEN URINE: NORMAL
WBC # FLD AUTO: 8.28 K/UL
WHITE BLOOD CELLS URINE: 10 /HPF

## 2023-01-01 PROCEDURE — 99215 OFFICE O/P EST HI 40 MIN: CPT

## 2023-01-01 PROCEDURE — G2212 PROLONG OUTPT/OFFICE VIS: CPT

## 2023-01-01 PROCEDURE — 99214 OFFICE O/P EST MOD 30 MIN: CPT

## 2023-01-01 PROCEDURE — 99442: CPT | Mod: 95

## 2023-01-01 PROCEDURE — 70450 CT HEAD/BRAIN W/O DYE: CPT | Mod: 26,MH

## 2023-01-01 PROCEDURE — 99214 OFFICE O/P EST MOD 30 MIN: CPT | Mod: 25

## 2023-01-01 PROCEDURE — 83036 HEMOGLOBIN GLYCOSYLATED A1C: CPT | Mod: QW

## 2023-01-01 PROCEDURE — 99483 ASSMT & CARE PLN PT COG IMP: CPT

## 2023-01-01 PROCEDURE — 99204 OFFICE O/P NEW MOD 45 MIN: CPT

## 2023-01-01 PROCEDURE — 70450 CT HEAD/BRAIN W/O DYE: CPT

## 2023-01-01 PROCEDURE — 99497 ADVNCD CARE PLAN 30 MIN: CPT

## 2023-01-01 RX ORDER — EMPAGLIFLOZIN 25 MG/1
25 TABLET, FILM COATED ORAL DAILY
Qty: 30 | Refills: 3 | Status: ACTIVE | OUTPATIENT
Start: 2023-01-01

## 2023-01-01 RX ORDER — DONEPEZIL HYDROCHLORIDE 5 MG/1
5 TABLET ORAL
Qty: 30 | Refills: 3 | Status: ACTIVE | OUTPATIENT
Start: 2023-01-01

## 2023-01-01 RX ORDER — MIDODRINE HYDROCHLORIDE 2.5 MG/1
2.5 TABLET ORAL
Qty: 30 | Refills: 0 | Status: ACTIVE | COMMUNITY
Start: 2023-01-01

## 2023-01-01 RX ORDER — SELEGILINE HYDROCHLORIDE 5 MG/1
5 TABLET ORAL
Qty: 90 | Refills: 3 | Status: COMPLETED | OUTPATIENT
Start: 2022-01-01 | End: 2023-01-01

## 2023-01-01 RX ORDER — MIDODRINE HYDROCHLORIDE 2.5 MG/1
2.5 TABLET ORAL
Qty: 15 | Refills: 0 | Status: DISCONTINUED | COMMUNITY
End: 2023-01-01

## 2023-01-01 SDOH — SOCIAL STABILITY - SOCIAL INSECURITY: DEPENDENT RELATIVE NEEDING CARE AT HOME: Z63.6

## 2023-01-19 NOTE — HISTORY OF PRESENT ILLNESS
[FreeTextEntry1] : 77 year old man hx DM, Htn, nosebleeds, nephrolithiasis, CAD, stent referred for CKD and albuminuria.\par \par Pt has had DM for many years with generally good control. No use of NSAIDS, no heavy metals, toxins, no skin rash, no joint pains.\par \par Still losing some weight\par had recent increase in creatinine\par urine protein increased as well\par

## 2023-01-19 NOTE — ASSESSMENT
[FreeTextEntry1] : 77 year old man hx DM, Htn, nosebleeds, nephrolithiasis, CAD, stent referred for CKD and albuminuria.\par Pt has had DM for many years with generally good control. No use of NSAIDS, no heavy metals, toxins, no skin rash, no joint pains.\par Probable diabetic nephropathy. \par \par Still losing some weight\par had recent increase in creatinine\par urine protein increased as well\par \par CKD\par   DMN\par   Proteinuria increased\par   on tolerated meds\par \par Check labs today\par No changes pre-results\par \par The total time of preparation for this visit, the visit itself and writing the note was 31 minutes\par \par   \par \par \par \par

## 2023-02-12 NOTE — PATIENT PROFILE ADULT - NSPROMUTANXFEARFT_GEN_A_NUR
SW Would like to have a synchronized care coordinator who knows entirely what is going on with plan of care/progress.

## 2023-03-01 PROBLEM — R63.0 DECREASED APPETITE: Status: ACTIVE | Noted: 2023-01-01

## 2023-03-14 NOTE — PHYSICAL EXAM
[FreeTextEntry1] : There is facial masking. Hypophonic. There are hypometric saccades. Tremor is absent.  2+ cogwheeling in his limbs R>L. Eric 3+ distal >proximal, R>L. There is no ataxia. able to stand without use of hands. Walks with reduced SL and both arms. Clara is good. no FOG. pull test with 1-2 steps

## 2023-03-14 NOTE — DISCUSSION/SUMMARY
[FreeTextEntry1] : PD x 8 years with cognitive impairment likely on PDD spectrum. \par Refractory constipation is likely limiting some of the levodopa kinetics at this time.\par Daytime somnolence persists\par \par Patient was counseled on the following recommendations:\par \par - Start drinking coffee in the morning to see if daytime napping improves. Instructed to take no more than 1 hr nap\par - Otherwise leave LD regimen the same. ensure doses are at least 3 hours apart\par - Refer to GI for constipation management\par - increase weekly exercising\par \par Follow up 4 months\par

## 2023-03-14 NOTE — HISTORY OF PRESENT ILLNESS
[FreeTextEntry1] : Since last visit in November 2022, He reports each dose of carbidopa-levodopa takes 15-30 min to kick in and an hour later he feels it wearing off. They decided to not take the selegeline since the last visit. Naps 1hr per day. No exercise or PT. watch TV most of the day. Denies recent falls, difficulty turning in bed. Does all ADLs independently\par \par Nonmotor:\par + constipation problematic. Has BM 2x/week. colace now. Now taking miralax. \par sleep is good. RBD rare\par Drooling is not a problem when eating \par + coughs when he eats sometimes\par occasional orthostatis, midodrine discontinued by nephro due to rising Cr\par Denies confusion or VH. Word retrieval is slow\par \par \par PMH HTN, CAD s/p PCI, DM, CKD\par \par PD meds\par sinemet  1 tab QID ( q3.5-4hrs). 8am, 11, 2pm, 5. the timing depends if he takes a nap. \par sinemet ER  1 tab qhs\par

## 2023-03-24 NOTE — REVIEW OF SYSTEMS
[Recent Weight Loss (___ Lbs)] : recent [unfilled] ~Ulb weight loss [Sore Throat] : sore throat [Wheezing] : wheezing [As Noted in HPI] : as noted in HPI [Dizziness] : dizziness [Negative] : Heme/Lymph

## 2023-03-24 NOTE — ASSESSMENT
[FreeTextEntry1] : Impression:\par \par #1 chronic constipation–persistent constipation exacerbated by significant medical comorbidity including Parkinson's disease, diabetes and impaired mobility.  Medications may be contributory.\par \par #2 status post appendectomy.\par \par #3 history SBO 2015–possibly attributed to adhesions from appendectomy.  Ultimately resolved after 11-day hospitalization.\par \par General medical problems:\par \par -Hypertension.\par \par -Diabetes mellitus.\par \par -Parkinson's disease.\par \par -Dementia with Parkinson's disease.\par \par -CKD.\par \par -CAD status post coronary stent x 2.  Status post MI.\par \par -Normocytic anemia.\par \par -History kidney stones.\par \par -Raynaud's.\par \par -Status post excision Merkel cell tumor.\par \par -Childhood asthma.

## 2023-03-24 NOTE — CONSULT LETTER
[Dear  ___] : Dear  [unfilled], [Consult Letter:] : I had the pleasure of evaluating your patient, [unfilled]. [( Thank you for referring [unfilled] for consultation for _____ )] : Thank you for referring [unfilled] for consultation for [unfilled] [Please see my note below.] : Please see my note below. [Consult Closing:] : Thank you very much for allowing me to participate in the care of this patient.  If you have any questions, please do not hesitate to contact me. [Sincerely,] : Sincerely, [FreeTextEntry2] : Dr. Abhinav Marcial [FreeTextEntry3] : Salomón Mcguire MD [DrJose  ___] : Dr. SRIVASTAVA

## 2023-03-24 NOTE — REASON FOR VISIT
[Consultation] : a consultation visit [Spouse] : spouse [FreeTextEntry1] : for treatment of constipation.

## 2023-03-24 NOTE — HISTORY OF PRESENT ILLNESS
[FreeTextEntry1] : Office consultation on 3/24/2023.  Patient is a 77-year-old man evaluated regarding constipation.  Neurology: Dr. Abhinav Riley.  PMD: Dr. Doyle Geronimo.\par \par History primarily from wife.  Patient with component of dementia and impaired memory.\par \par Current symptoms over past 3 to 5 years.  Vague as to any precipitating factors at onset.  Wife reports issues of intermittent and episodic constipation.  Episodes approximately every 2 to 3 months.  Precipitating factors not reported.  Constipation described as periods of time with no bowel movement.  At times has gone up to 10 days with no bowel movement.  He appears not to experience the urge during those intervals.  Does experience feeling gassy.  Episodes typically treated with various laxatives including prune juice, MiraLAX and stool softeners at which time he begins to have multiple frequent stools and at times with loose evacuations.  No documented history of fecal impaction.\par \par Vague as to bowel habit during intervals between these episodes.  Appears to have formed bowel movements every 3 days.  Not on any consistent bowel regimen.  Occasionally uses Colace or MiraLAX.\par \par Last colonoscopy 10 years ago.\par \par Hospitalized for small bowel obstruction in 2015.  Apparently had NG decompression and hospitalization for 11 days with ultimate resolution of obstruction.  Not operated on because of recent coronary stent insertion.  Reports no history of digestive illness.\par \par Patient has experienced an unexplained weight loss of 30 pounds over the past year.\par \par Appetite fair.  Has poor diet.  Denies complaints of dysphagia, heartburn, nausea, vomiting or abdominal pain.  Does experience of abdominal gaseous discomfort.  Denies rectal bleeding.\par \par Typical diet:\par Breakfast–cereal and fat-free milk.  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  Family history colon cancer not reported.\par \par Review of 1/2023 labs:\par -CBC: WBC 8280, hemoglobin 11.7, hematocrit 35.9, MCV 88.2, platelet count 166,000.\par -CMP: BUN 55 and creatinine 2.74.  Total protein 6.9, albumin 4.2, bilirubin 1.4, AST 6, ALT <5.\par -Hemoglobin A1c 6.8.

## 2023-03-24 NOTE — END OF VISIT
(4) Less than 3 years old
[Time Spent: ___ minutes] : I have spent [unfilled] minutes of time on the encounter.

## 2023-03-24 NOTE — PHYSICAL EXAM
[Alert] : alert [No Acute Distress] : no acute distress [Sclera] : the sclera and conjunctiva were normal [Oropharynx] : the oropharynx was normal [Normal Appearance] : the appearance of the neck was normal [No Neck Mass] : no neck mass was observed [No Respiratory Distress] : no respiratory distress [No Acc Muscle Use] : no accessory muscle use [Respiration, Rhythm And Depth] : normal respiratory rhythm and effort [Heart Rate And Rhythm] : heart rate was normal and rhythm regular [Normal S1, S2] : normal S1 and S2 [Murmurs] : no murmurs [None] : no edema [Bowel Sounds] : normal bowel sounds [Abdomen Tenderness] : non-tender [No Masses] : no abdominal mass palpated [Abdomen Soft] : soft [] : no hepatosplenomegaly [Umbilical] : umbilical [Normal Sphincter Tone] : normal sphincter tone [No External Hemorrhoid] : no external hemorrhoids [Cervical Lymph Nodes Enlarged Posterior Bilaterally] : no posterior cervical lymphadenopathy [No Clubbing, Cyanosis] : no clubbing or cyanosis of the fingernails [Normal Color / Pigmentation] : normal skin color and pigmentation [de-identified] : Appears frail and chronically ill.  Requires assistance with ambulation.  Otherwise NAD/nontoxic appearing. [de-identified] : Healed right lower quadrant appendectomy scar.  Slightly protuberant but otherwise soft, nontender, nondistended and without mass or hepatosplenomegaly.  Small umbilical hernia.  Diastasis recti noted. [de-identified] : Perianal sensation intact.  Good sphincter tone.  No mass.  Not intact.  Small amount soft brown stool.  Blood not detected.  Perianal soiling noted. [de-identified] :   Ambulates with assistance.

## 2023-05-25 NOTE — HISTORY OF PRESENT ILLNESS
[FreeTextEntry1] : 78 year old man CKD DKD\par Hx DM, Htn, nosebleeds, nephrolithiasis, CAD, stent referred for CKD and albuminuria.\par \par Pt has had DM for many years with generally good control. No use of NSAIDS, no heavy metals, toxins, no skin rash, no joint pains.\par \par Still losing some weight\par Midodrine 2.5 mg qd\par

## 2023-05-25 NOTE — ASSESSMENT
[FreeTextEntry1] : 78 year old man CKD DKD\par Hx DM, Htn, nosebleeds, nephrolithiasis, CAD, stent referred for CKD and albuminuria.\par \par Pt has had DM for many years with generally good control. No use of NSAIDS, no heavy metals, toxins, no skin rash, no joint pains.\par \par Still losing some weight\par Midodrine 2.5 mg qd\par \par CKD\par   DKD\par   SCr 3.0\par   Jardiance early effect\par   Some low BP\par \par   Proteinuria increased\par   on tolerated meds\par \par The ttotal time of preparation for this visit, the visit itself and writing the note was 39 minutes\par \par   \par \par \par \par

## 2023-06-17 NOTE — PHYSICAL EXAM
[Alert] : alert [Well Nourished] : well nourished [No Acute Distress] : no acute distress [Well Developed] : well developed [Normal Sclera/Conjunctiva] : normal sclera/conjunctiva [EOMI] : extra ocular movement intact [No Proptosis] : no proptosis [No Lid Lag] : no lid lag [Thyroid Not Enlarged] : the thyroid was not enlarged [No Thyroid Nodules] : no palpable thyroid nodules [Clear to Auscultation] : lungs were clear to auscultation bilaterally [Normal S1, S2] : normal S1 and S2 [Normal Rate] : heart rate was normal [Regular Rhythm] : with a regular rhythm [No Edema] : no peripheral edema [Normal Bowel Sounds] : normal bowel sounds [Not Tender] : non-tender [Not Distended] : not distended [Soft] : abdomen soft [Normal Anterior Cervical Nodes] : no anterior cervical lymphadenopathy [No Spinal Tenderness] : no spinal tenderness [Spine Straight] : spine straight [No Stigmata of Cushings Syndrome] : no stigmata of Cushings Syndrome [Normal Sensation on Monofilament Testing] : normal sensation on monofilament testing of lower extremities [Oriented x3] : oriented to person, place, and time [de-identified] : skin in tact, decrease in pulses [de-identified] : decreased pedal pulses [de-identified] : Slow gait

## 2023-06-17 NOTE — ASSESSMENT
[Diabetic Medications] : Risks and benefits of diabetic medications were discussed [Levothyroxine] : The patient was instructed to take Levothyroxine on an empty stomach, separate from vitamins, and wait at least 30 minutes before eating [FreeTextEntry1] : 77 y/o male with: DM Type 2, hypothyroidism. Demential Lewy Body type\par \par 1. T2DM: Pt appears to be tightly controlled on NovoLog 3/3 and Lantus 14 units.  Nephrology added Jardiance for microalbumin.. No further hypos. Normal monofilament test.  Hemoglobin A1c today 5.8%.  I have recommended that we should not have such tight blood sugar control in order to reduce the risk of hypoglycemia.  Recommend stopping NovoLog prior to dinner and then if blood sugars remain good stopping it prior to breakfast as well.\par Can continue Lantus 14 units/day currently.\par \par Treatment of Diabetes in Older Adults: An Endocrine Society* Clinical Practice Guideline Tom Milan  et al. The Journal of Clinical Endocrinology & Metabolism, Volume 104, Issue 5, May 2019, Pages 3773–1572\par \par 2. Hypothyroidism. Clinically euthyroid on LT4 75 mcg daily. No local neck pain. No dysphagia or dysphonia. No raciness, shakiness, heat/cold intolerance, tiredness, or fatigue. No palpitations, tremors, or sudden weight gain/loss. Repeat TFT.\par \par 3. HLD well controlled on current medication.\par Request labs from  Dr Geronimo, \par F/u in 4 months

## 2023-06-17 NOTE — HISTORY OF PRESENT ILLNESS
[FreeTextEntry1] : Patient returns for a follow up visit for DM Type 2 and hypothyroidism. \par \par Type 2 DM: Tightly controlled on NovoLog 3 units at breakfast and 3 units at dinner.  And Lantus 18 units.  Stopped prelunch insulin.  Blood sugars reported in the low 100 range.  No major hypoglycemia.  No polyuria polydipsia.   . Last ophtho 10/2022\par Hypothyroidism: on LT4 75 mcg daily. No local neck pain. No dysphagia or dysphonia. No raciness, shakiness, heat/cold intolerance, tiredness, or fatigue. No palpitations, tremors, or sudden weight gain/loss.\par .  \par Other Hx;. Early dementia, Lewy Body,  no Rx yet\par \par  saw Dr. Toussaint from renal for diabetic nephropathy. Added Jardiance for renal protection\par Dx with prostate CA in 2018. Began RT May 2019.\par \par Added extended release carbidopa/levodopa for Parkinsons.

## 2023-06-27 NOTE — HISTORY OF PRESENT ILLNESS
Pt states ready for discharge; verbalizes understanding of instructions.   [FreeTextEntry1] : In April, he had 2 near syncopal episodes. At that time his midodrine was increased to 2,5mg qd following evidence of OH by his PCP. \par In the last 1-2 weeks, his wife says that patient gets disoriented and has staring episodes. \par He fell twice in 3 days; the etiology of the falls are not known. Patient says that the second fall occurred as he stood up from his bed and fell over. Denies syncopizing. He grazed his head. Afterwards he could walk much and slept most of the day. \par Yesterday, he improved and currently has episodes of dizziness. \par He does all ADLs independently. \par He is not doing PT\par It takes sinemet a few minutes to kick in and last 2-3 hrs. He often takes his LD doses later than 3hrs. \par \par \par Nonmotor:\par + constipation problematic. Taking miralax, flax seed, prune juice\par sleep is good. RBD rare\par + coughs when he eats sometimes\par Does not have hallucinations. More confusion is present. \par \par \par PMH HTN, CAD s/p PCI, DM, CKD\par \par PD meds\par sinemet  1 tab QID ( q3.5-4hrs). 8am, 11, 2pm, 5. the timing depends if he takes a nap. \par sinemet ER  1 tab qhs\par midodrine 2.5mg daily \par

## 2023-06-27 NOTE — PHYSICAL EXAM
[Person] : oriented to person [Place] : oriented to place [Time] : oriented to time [Cranial Nerves Oculomotor (III)] : extraocular motion intact [FreeTextEntry1] : Moderate bradyphrenia. There is facial masking. Hypophonic. There are hypometric saccades. Tremor is absent. There is 2+ rigidity in his neck. 2+ cogwheeling in his limbs. Eric 3+ distal >proximal, R>L. There is no ataxia. Walks with reduced SL. Right armswing is depressed. No FOG.  Pull test negative

## 2023-06-27 NOTE — DISCUSSION/SUMMARY
[FreeTextEntry1] : PDD with increased falls with FoG and OH. He appears more bradykinetic since his last visit.\par Cognitive impairment has progressed with more periods of disorientation and memory deficits\par \par Patient was counseled on the following recommendations:\par - HCT to r/o SDH given recent fall and increased episodes of disorientation\par - take sinemet  1.5 tabs alt 1tab 8/11/2/5\par - cont sinemet ER  1 tab hs\par - add aricept 5mg hs, AEs reviewed\par - cont midodrine 2.5mg qd, f/u with cards and nephro for BP management. \par - counseled his wife on exploring getting HHA to assist patient during the day due to his fall risk\par \par f.u 3-4 months

## 2023-07-10 PROBLEM — Z63.6 CAREGIVER STRESS: Status: ACTIVE | Noted: 2022-02-03

## 2023-07-10 PROBLEM — K59.09 CHRONIC CONSTIPATION: Status: ACTIVE | Noted: 2022-08-10

## 2023-07-10 PROBLEM — R26.0 ATAXIC GAIT: Status: ACTIVE | Noted: 2022-01-01

## 2023-07-10 PROBLEM — Z74.09 IMPAIRED MOBILITY AND ADLS: Status: ACTIVE | Noted: 2021-12-07

## 2023-07-10 PROBLEM — Z71.89 ADVANCE CARE PLANNING: Status: ACTIVE | Noted: 2021-12-07

## 2023-07-10 PROBLEM — G31.83 DEMENTIA WITH LEWY BODIES: Status: ACTIVE | Noted: 2021-06-14

## 2023-07-10 PROBLEM — Z91.81 AT RISK FOR FALLS: Status: ACTIVE | Noted: 2022-01-01

## 2023-07-10 NOTE — SOCIAL HISTORY
[With spouse] : lives with spouse [FreeTextEntry1] : Amira  [FreeTextEntry4] : spouse [CaregiverAge] : 70s [[facility]] : 1 [CaregiverMSCIScore] : 5 [CaregiverDBSScore] : 9

## 2023-07-10 NOTE — HISTORY OF PRESENT ILLNESS
[One fall no injury in past year] : Patient reported one fall in the past year without injury [Patient is independent with] : bathing [Independent] : housekeeping [Full assistance needed] : Assistance needed managing medications [FAST Score: ____] : Functional Assessment Scale (FAST) Score: [unfilled] [Walker] : walker [Smoke Detector] : smoke detector [Carbon Monoxide Detector] : carbon monoxide detector [Stair Lift] : stair lift used in home [Grab Bars] : grab bars [Night Light] : night light [I have developed a follow-up plan documented below in the note.] : I have developed a follow-up plan documented below in the note. [] : 0 [Reviewed updated] : Reviewed, updated [Designated Healthcare Proxy] : Designated healthcare proxy [Name: ___] : Health Care Proxy's Name: [unfilled]  [I will adhere to the patient's wishes.] : I will adhere to the patient's wishes. [FreeTextEntry1] : ADC Program ID:[id number]\par \par Mr. CAMILA PAYNE is a 76 yoM with PMHx of DLB, was referred to the Alzheimer's and Dementia Program by MD Golden and MD Yang for comanagement of dementia-related issues and coordination of dementia care. Pt. presents with Amira,spouse who assisted in hx intake due to pt.'s dementia. \par \par \par #dementia\par -reports has has a few minor falls, no injury, better now that on midodrine 2.5mg daily\par -reports declining memory \par -started on donepezil \par -"major problem that I cannot leave him alone"\par -spouse understands that she cannot leave him alone \par -cameras in the house \par -[facility] care in place for 8 hours for 1 day, visiting grandson for 12 hours at this camp \par -eating OK, but continues to lose weight \par -episodes of urinary incontinence \par \par #CKD\par -worsening CKD, Cr at 2.89 Dr. Toussaint \par -follows closely with nephrology \par \par \par #constipation\par -taking miralax\par -started flax seed \par -every other day \par \par #caregiver burden \par -stable \par \par \par #HCM\par -f/u pcp Dr. Geronimo 7/26/23 \par \par \par Denies pain. Denies acute visits/falls. Denies HA/dizziness, SOB/CP, abdominal pain, dysuria, reports daily BMs regular. \par \par Plan \par -completed MOLST today \par -call from LACIE Zhu, HHA care, was a veterans \par -needs 24/7 supervision \par -structured meals, and increased supervision \par \par ADC acuity GREEN, f/u in 4 months. \par \par \par (3/1/23)\par **ADC annual*** \par \par #dementia\par -reports some weight loss\par -skipping meals, sometimes lunch \par -sleeping well \par -denies behaviors \par -reports some concern with paying Jardiance; will be [facility]  \par -reports worsening word finding difficulty \par -has medical alert bracelet \par -recently completed PT \par -[facility] evaluated [facility] for [facility] \par \par #CKD\par -worsening CKD, Cr at 2.89 Dr. Toussaint \par -follows closely with nephrology \par \par #constipation\par -was constipated, was taking colace and miralax\par -then he loss control of BMs\par \par #caregiver burden \par -Amira needs ablation in 3 weeks \par \par \par #HCM\par -f/u pcp Dr. Geronimo 7/26/23 \par -HCP is spuse \par \par \par Plan \par -increase caloric intake \par -try adding benefiber \par -pending HCP and POA \par -[email or web address], conversation project \par -e-mail from LACIE Gandhi \par -cameras \par -labs\par -[facility] box health referral today \par \par \par Drinking well, about 32-40oz a day. Bms yesterday. Denies pain. Denies acute visits/falls. Denies HA/dizziness, SOB/CP, abdominal pain, dysuria, reports daily BMs regular. \par \par ADC acuity GREEN, f/u in 4 months. \par \par \par CAREGIVER 1: \par Name/Relationship: Amira,wife\par Involvement in care:\par Mailing Address:\par Phone Number:[phone]\par E-mail:\par Best time to reach this person: \par Patient permission to contact this person:\par \par CAREGIVER 2: \par Name/Relationship:Germán,son \par Involvement in care:[facility], visits 1x/week \par Mailing Address:\par Phone Number:[phone]\par E-mail:\par Best time to reach this person: \par Patient permission to contact this person:yes \par \par CAREGIVER 3: \par Name/Relationship:Leticia, daughter  \par Involvement in care:[place], visits 1x/week \par Mailing Address:\par Phone Number:[phone]\par E-mail:\par Best time to reach this person: yes\par Patient permission to contact this person:\par \par Primary Care Physician:PCP Dr. Kartik Geronimo at [facility]\par Previous Physicians:\par Neurologists:Dr. Riley \par \par \par Suspect Medications (associated with mental status changes):  stopped midodrine, adding\par Recent hospitalization/ ED Visits/ Nursing Home Stays:\par \par Social History:\par Primary Language:English\par Marital Status:\par Children:2\par Education:[place]\par Occupation:accounting \par Activity/Exercise:\par Alcohol:no\par Sexually active: \par Driving Habits:no\par Firearms:no\par \par Living Situation:\par Housing (stairs/levels): 12 steps to the basements, "has to do the wash" \par Length at Residence:32 years \par Lives with:spouse \par Caregivers (non-paid and paid):no, just got a cleaning lady 2 weeks ago. \par Safety Concerns: none \par Wandering:no \par Falls:12/2021 on the step, by himself, "tripped"\par Fear of Falling: no\par \par Financial Situation:SS, pension, no concerns elder  (over the summer), POA\par \par Advance Directives:\par HCP/Advance Directives/Living will:wife? spouse will provide HCP copy\par HCP/Alternate Decision Maker: \par MOLST: completed today\par What matters most? \par "my health, and my wife's health" \par \par pt. wanted to completed DNR form today. \par \par -DNR/DNI\par -limited medical interventions\par -send to the hospital if necessary\par -no feeding tube\par -trial of IV fluids\par -determine use or limitation of antibiotics\par -use dialysis \par \par Provided education on MOLST form from [facility] \par  [email or web address]\par [email or web address] \par \par -educated on MOLST, [email or web address] and conversation project\par \par 2022\par "TV, law and order" \par is [facility]  [Guns at Home] : no guns at home [Driving Concerns] : not driving or driving without noted concerns [FreeTextEntry8] : gets to bathroom in time, 2 episodes of inc of stool, likely due to constipation  [de-identified] : 7 [de-identified] : with spouse  [de-identified] : uses [employer] oven [de-identified] : counseled on fall safety  [FreeTextEntry6] : spouse [FreeTextEntry7] : needs assitance. spouse prepares the med box, "forgets to take insulin or meds" supervision  [de-identified] : 3 [de-identified] : has a rollator, " I can't pick him up". counseled pt. cannot be a lone  [de-identified] : uses sock kit  [CornellScale] : 4 3/1/2023 [Moderate] : Stage: Moderate [Worse] : Status: Worse [FreeTextEntry] : 2 [de-identified] : 3 [NPI-QTotalScore] : 5 [Time Spent: ___ minutes] : Time Spent: [unfilled] minutes [AdvancecareDate] : 7/10/23 [FreeTextEntry4] : Advance Directives:\par HCP/Advance Directives/Living will:wife? spouse will provide HCP copy\par HCP/Alternate Decision Maker: \par MOLST: completed today\par What matters most? \par "my health, and my wife's health" \par \par pt. wanted to completed DNR form today. \par \par -DNR/DNI\par -limited medical interventions\par -send to the hospital if necessary\par -no feeding tube\par -trial of IV fluids\par -determine use or limitation of antibiotics\par -use dialysis

## 2023-07-10 NOTE — ASSESSMENT
[FreeTextEntry1] : \par Social Care Plan\par 1. Respite: \par \par -Provided hotline contact to Alzheimer's Association [phone].\par -[facility]\par -Provided link to www. communityresourcefinder.org\par -provided [facility] agency \par -task sent to LACIE An.\par \par \par 2. Caregiver Education:\par Sent e-mail to -\par \par I wanted to share useful tools to help manage your family member's dementia behaviors. I have included caregiver training videos from [facility]'s [facility] Dementia [facility] to help guide you and caretakers, as well as a daily care plan from the Alzheimer's Association. Maintaining a schedule and a structured day helps patients with dementia. I am available if you need any further assistance or have any questions. See below:\par 1.depression/apathy.\par 2. Alzheimer's Association Daily Care Plan:\par  https://www.alz.org/help-support/caregiving/daily-care/daily-care-plan\par 3. Educated spouse when  behaviors occur to acknowledge patient's emotions and redirect behavior with distractions, address physical needs. Provided examples.\par 4. Provided Alzheimer's website. Educated spouse about support groups in [place].\par \par Look through books, picture albums,, painting, puzzles, listen to music, daily chores.\par \par 5. Alzheimer's Association Community Resource Finder \par    www.alz.org/nca/helping you/community-resource-finder \par 6. Lewy Body Dementia Symptoms and Causes alz.org \par 7. LBDA non-profit organization contact\par 8. compassionate allowance program part of [employer]SA \par \par 3. [facility]: [phone] \par www.lidemntia.org/caregiver-yoga-series/\par \par 3. Caregiver support:\par -met with SW previously \par \par \par 4. Safety: \par -counseled pt. must be accompanied 24/7. Counseled pt. is a high fall risk. Continue will fall safety precautions. \par -medical alert bracelet\par \par 5. CG stress:\par -Counseled on non-pharmacological interventions for stress which include medication micah (Calm), daily physical activity. \par \par 6. Alz Association Support Groups PDF\par \par \par 7. [facility] education folder attached PFD.\par \par \par \par -completed MOLST today \par -call from LACIE Zhu, [facility] care, was a veterans \par -needs 24/7 supervision \par -structured meals, and increased supervision .\par -excluding ACP discussion \par \par  \par \par [facility] acuity GREEN, f/u in 4 months. \par \par  Hina Arriaga, LILIP-BC

## 2023-07-10 NOTE — PHYSICAL EXAM
[Alert] : alert [Sclera] : the sclera and conjunctiva were normal [PERRL] : pupils were equal in size, round, and reactive to light [Normal Oral Mucosa] : normal oral mucosa [No Oral Pallor] : no oral pallor [No Oral Cyanosis] : no oral cyanosis [Oropharynx] : the oropharynx was normal [Normal Appearance] : the appearance of the neck was normal [No Neck Mass] : no neck mass was observed [No Respiratory Distress] : no respiratory distress [No Acc Muscle Use] : no accessory muscle use [Respiration, Rhythm And Depth] : normal respiratory rhythm and effort [Auscultation Breath Sounds / Voice Sounds] : lungs were clear to auscultation bilaterally [Normal PMI] : the apical impulse was abnormal [Normal S1, S2] : normal S1 and S2 [Murmurs] : no murmurs [Heart Rate And Rhythm] : heart rate was normal and rhythm regular [Edema] : edema was not present [Bowel Sounds] : normal bowel sounds [Abdomen Tenderness] : non-tender [Abdomen Soft] : soft [Cervical Lymph Nodes Enlarged Posterior Bilaterally] : posterior cervical [Supraclavicular Lymph Nodes Enlarged Bilaterally] : supraclavicular [Cervical Lymph Nodes Enlarged Anterior Bilaterally] : anterior cervical, supraclavicular [Axillary Lymph Nodes Enlarged Bilaterally] : axillary [No CVA Tenderness] : no CVA  tenderness [No Spinal Tenderness] : no spinal tenderness [Normal Color / Pigmentation] : normal skin color and pigmentation [No Focal Deficits] : no focal deficits [Normal Affect] : the affect was normal [Normal Mood] : the mood was normal [___ / 5] : Visuospatial / Executive: [unfilled] / 5 [0 / 0] : Memory: 0 / 0 [___ / 3] : Attention (Serial 7 subtraction): [unfilled] / 3 [___ / 1] : Fluency: [unfilled] / 1 [___ / 2] : Abstraction: [unfilled] / 2 [___ / 5] : Delayed Recall: [unfilled] / 5 [___ / 6] : Orientation: [unfilled] / 6 [MocaTotal] : 14 [FreeTextEntry1] : 3/1/23 [de-identified] : elderly male, calm, soft spoken, smiling today, soft spoken  [de-identified] : rounded [de-identified] : ataxic wide based gait  [de-identified] : calm

## 2023-08-31 NOTE — ASSESSMENT
[FreeTextEntry1] : 78 year old man CKD DKD orthostatic hypotension Hx DM, Htn, nosebleeds, nephrolithiasis, CAD, stent referred for CKD and albuminuria.  Pt has had DM for many years with generally good control. No use of NSAIDS, no heavy metals, toxins, no skin rash, no joint pains.  Still losing some weight Midodrine 5 mg qd  CKD   DKD   SCr 3.0   Jardiance early effect   Some low BP  Orthostatic Hypotension   Midodrrine 5 mg per day   Can add second dose if needed for afternoon   Can add Droxidopa if midodrine is not enough  The total time of preparation for this visit, the visit itself and writing the note was 37 minutes

## 2023-09-28 PROBLEM — R13.10 DYSPHAGIA, UNSPECIFIED TYPE: Status: ACTIVE | Noted: 2021-03-09

## 2023-10-10 ENCOUNTER — APPOINTMENT (OUTPATIENT)
Dept: NEUROLOGY | Facility: CLINIC | Age: 78
End: 2023-10-10

## 2023-10-16 ENCOUNTER — APPOINTMENT (OUTPATIENT)
Dept: GERIATRICS | Facility: CLINIC | Age: 78
End: 2023-10-16

## 2023-11-14 ENCOUNTER — APPOINTMENT (OUTPATIENT)
Dept: NEPHROLOGY | Facility: CLINIC | Age: 78
End: 2023-11-14

## 2023-11-16 ENCOUNTER — NON-APPOINTMENT (OUTPATIENT)
Age: 78
End: 2023-11-16

## 2024-01-29 ENCOUNTER — APPOINTMENT (OUTPATIENT)
Dept: ENDOCRINOLOGY | Facility: CLINIC | Age: 79
End: 2024-01-29

## 2024-06-06 NOTE — CURRENT MEDS
Please see the attached refill request.   [Yes] : Reviewed medication list for presence of high-risk medications. [Patient/caregiver able to verbalize understanding of medications, including indications and side effects] : Patient/caregiver able to verbalize understanding of medications, including indications and side effects

## 2024-07-19 NOTE — ASSESSMENT
[FreeTextEntry1] : 67 year old man hx DM, Htn, nosebleeds, nephrolithiasis, CAD, stent referred for CKD and albuminuria.\par Pt has had DM for many years with geenrally good control. No use of NSAIDS, no heavy metals, toxins, no skin rash, no joint pains.\par Probable diabetic nephropathy. \par \par Parkinsons dx in doubt\par Off prmaiprexole\par Dec Levo dopa\par No orthostsisis today\par Bu two falls over summer, coming off midodrine\par  No

## 2024-10-24 NOTE — HISTORY OF PRESENT ILLNESS
[FreeTextEntry1] : Patient has been physically stable since last visit. \par BPs have been stable\par Denies any falls\par He has more energy when he takes LD\par Hallucinations are absent. Does not get disoriented during the day\par Not exercising or doing PT\par \par Nonmotor:\par + constipation problematic. Has BM every 1 week. Notes occ fecal incontinence overnight\par sleep is good. RBD rare\par Drooling is not a problem\par + coughs when he eats sometimes\par \par \par PMH HTN, CAD s/p PCI, DM, CKD\par \par PD meds\par sinemet  1 tab QID ( q3.5-4hrs)\par sinemet ER  1 tab qhs\par midodrine 2,5mg qod [Fever] : no fever [Chills] : no chills [Fatigue] : no fatigue [Chest Pain] : no chest pain [Palpitations] : no palpitations [Shortness Of Breath] : no shortness of breath [Wheezing] : no wheezing [Abdominal Pain] : no abdominal pain [Vomiting] : no vomiting [Easy Bleeding] : no tendency for easy bleeding [Easy Bruising] : no tendency for easy bruising
